# Patient Record
Sex: FEMALE | Race: WHITE | NOT HISPANIC OR LATINO | Employment: OTHER | URBAN - METROPOLITAN AREA
[De-identification: names, ages, dates, MRNs, and addresses within clinical notes are randomized per-mention and may not be internally consistent; named-entity substitution may affect disease eponyms.]

---

## 2017-01-05 ENCOUNTER — ALLSCRIPTS OFFICE VISIT (OUTPATIENT)
Dept: OTHER | Facility: OTHER | Age: 72
End: 2017-01-05

## 2017-01-07 ENCOUNTER — ANESTHESIA EVENT (OUTPATIENT)
Dept: PERIOP | Facility: AMBULARY SURGERY CENTER | Age: 72
End: 2017-01-07
Payer: MEDICARE

## 2017-01-09 ENCOUNTER — ANESTHESIA (OUTPATIENT)
Dept: PERIOP | Facility: AMBULARY SURGERY CENTER | Age: 72
End: 2017-01-09
Payer: MEDICARE

## 2017-01-09 ENCOUNTER — HOSPITAL ENCOUNTER (OUTPATIENT)
Facility: AMBULARY SURGERY CENTER | Age: 72
Setting detail: OUTPATIENT SURGERY
Discharge: HOME | End: 2017-01-09
Attending: OPHTHALMOLOGY | Admitting: OPHTHALMOLOGY
Payer: MEDICARE

## 2017-01-09 VITALS
HEART RATE: 65 BPM | TEMPERATURE: 97.7 F | BODY MASS INDEX: 28.19 KG/M2 | SYSTOLIC BLOOD PRESSURE: 199 MMHG | WEIGHT: 180 LBS | OXYGEN SATURATION: 96 % | DIASTOLIC BLOOD PRESSURE: 91 MMHG | RESPIRATION RATE: 20 BRPM

## 2017-01-09 PROCEDURE — V2632 POST CHMBR INTRAOCULAR LENS: HCPCS | Performed by: OPHTHALMOLOGY

## 2017-01-09 DEVICE — IOL SN60WF 13.0: Type: IMPLANTABLE DEVICE | Site: EYE | Status: FUNCTIONAL

## 2017-01-09 RX ORDER — GATIFLOXACIN 5 MG/ML
1 SOLUTION/ DROPS OPHTHALMIC 2 TIMES DAILY
Qty: 3 ML | Refills: 0
Start: 2017-01-09 | End: 2017-02-08

## 2017-01-09 RX ORDER — TETRACAINE HYDROCHLORIDE 5 MG/ML
SOLUTION OPHTHALMIC AS NEEDED
Status: DISCONTINUED | OUTPATIENT
Start: 2017-01-09 | End: 2017-01-09 | Stop reason: HOSPADM

## 2017-01-09 RX ORDER — LIDOCAINE HYDROCHLORIDE 20 MG/ML
1 JELLY TOPICAL
Status: COMPLETED | OUTPATIENT
Start: 2017-01-09 | End: 2017-01-09

## 2017-01-09 RX ORDER — BALANCED SALT SOLUTION 6.4; .75; .48; .3; 3.9; 1.7 MG/ML; MG/ML; MG/ML; MG/ML; MG/ML; MG/ML
SOLUTION OPHTHALMIC AS NEEDED
Status: DISCONTINUED | OUTPATIENT
Start: 2017-01-09 | End: 2017-01-09 | Stop reason: HOSPADM

## 2017-01-09 RX ORDER — TETRACAINE HYDROCHLORIDE 5 MG/ML
1 SOLUTION OPHTHALMIC ONCE
Status: COMPLETED | OUTPATIENT
Start: 2017-01-09 | End: 2017-01-09

## 2017-01-09 RX ORDER — GATIFLOXACIN 5 MG/ML
SOLUTION/ DROPS OPHTHALMIC AS NEEDED
Status: DISCONTINUED | OUTPATIENT
Start: 2017-01-09 | End: 2017-01-09 | Stop reason: HOSPADM

## 2017-01-09 RX ORDER — LIDOCAINE HYDROCHLORIDE 10 MG/ML
INJECTION, SOLUTION EPIDURAL; INFILTRATION; INTRACAUDAL; PERINEURAL AS NEEDED
Status: DISCONTINUED | OUTPATIENT
Start: 2017-01-09 | End: 2017-01-09 | Stop reason: HOSPADM

## 2017-01-09 RX ORDER — PHENYLEPHRINE HCL 2.5 %
1 DROPS OPHTHALMIC (EYE)
Status: COMPLETED | OUTPATIENT
Start: 2017-01-09 | End: 2017-01-09

## 2017-01-09 RX ORDER — KETOROLAC TROMETHAMINE 5 MG/ML
1 SOLUTION OPHTHALMIC 4 TIMES DAILY
Qty: 6 ML | Refills: 0
Start: 2017-01-09 | End: 2019-01-07 | Stop reason: ALTCHOICE

## 2017-01-09 RX ORDER — MIDAZOLAM HYDROCHLORIDE 1 MG/ML
INJECTION INTRAMUSCULAR; INTRAVENOUS AS NEEDED
Status: DISCONTINUED | OUTPATIENT
Start: 2017-01-09 | End: 2017-01-09 | Stop reason: SURG

## 2017-01-09 RX ORDER — SODIUM CHLORIDE, SODIUM LACTATE, POTASSIUM CHLORIDE, CALCIUM CHLORIDE 600; 310; 30; 20 MG/100ML; MG/100ML; MG/100ML; MG/100ML
125 INJECTION, SOLUTION INTRAVENOUS CONTINUOUS
Status: DISCONTINUED | OUTPATIENT
Start: 2017-01-09 | End: 2017-01-09 | Stop reason: HOSPADM

## 2017-01-09 RX ORDER — CYCLOPENTOLATE HYDROCHLORIDE 10 MG/ML
1 SOLUTION/ DROPS OPHTHALMIC
Status: COMPLETED | OUTPATIENT
Start: 2017-01-09 | End: 2017-01-09

## 2017-01-09 RX ADMIN — Medication 1 DROP: at 11:15

## 2017-01-09 RX ADMIN — Medication 1 DROP: at 10:30

## 2017-01-09 RX ADMIN — TETRACAINE HYDROCHLORIDE 1 DROP: 5 SOLUTION OPHTHALMIC at 10:30

## 2017-01-09 RX ADMIN — LIDOCAINE HYDROCHLORIDE 1 APPLICATION: 20 JELLY TOPICAL at 11:00

## 2017-01-09 RX ADMIN — CYCLOPENTOLATE HYDROCHLORIDE 1 DROP: 10 SOLUTION/ DROPS OPHTHALMIC at 11:15

## 2017-01-09 RX ADMIN — Medication 1 DROP: at 11:00

## 2017-01-09 RX ADMIN — LIDOCAINE HYDROCHLORIDE 1 APPLICATION: 20 JELLY TOPICAL at 10:30

## 2017-01-09 RX ADMIN — CYCLOPENTOLATE HYDROCHLORIDE 1 DROP: 10 SOLUTION/ DROPS OPHTHALMIC at 10:45

## 2017-01-09 RX ADMIN — CYCLOPENTOLATE HYDROCHLORIDE 1 DROP: 10 SOLUTION/ DROPS OPHTHALMIC at 10:30

## 2017-01-09 RX ADMIN — LIDOCAINE HYDROCHLORIDE 1 APPLICATION: 20 JELLY TOPICAL at 11:15

## 2017-01-09 RX ADMIN — CYCLOPENTOLATE HYDROCHLORIDE 1 DROP: 10 SOLUTION/ DROPS OPHTHALMIC at 11:00

## 2017-01-09 RX ADMIN — MIDAZOLAM HYDROCHLORIDE 2 MG: 1 INJECTION, SOLUTION INTRAMUSCULAR; INTRAVENOUS at 12:01

## 2017-01-09 RX ADMIN — Medication 1 DROP: at 10:45

## 2017-01-09 RX ADMIN — LIDOCAINE HYDROCHLORIDE 1 APPLICATION: 20 JELLY TOPICAL at 10:45

## 2017-01-12 ENCOUNTER — ALLSCRIPTS OFFICE VISIT (OUTPATIENT)
Dept: OTHER | Facility: OTHER | Age: 72
End: 2017-01-12

## 2017-01-16 ENCOUNTER — ALLSCRIPTS OFFICE VISIT (OUTPATIENT)
Dept: OTHER | Facility: OTHER | Age: 72
End: 2017-01-16

## 2017-02-06 ENCOUNTER — HOSPITAL ENCOUNTER (OUTPATIENT)
Facility: AMBULARY SURGERY CENTER | Age: 72
Setting detail: OUTPATIENT SURGERY
Discharge: HOME | End: 2017-02-06
Attending: OPHTHALMOLOGY | Admitting: OPHTHALMOLOGY
Payer: MEDICARE

## 2017-02-06 ENCOUNTER — ANESTHESIA EVENT (OUTPATIENT)
Dept: PERIOP | Facility: AMBULARY SURGERY CENTER | Age: 72
End: 2017-02-06
Payer: MEDICARE

## 2017-02-06 ENCOUNTER — ANESTHESIA (OUTPATIENT)
Dept: PERIOP | Facility: AMBULARY SURGERY CENTER | Age: 72
End: 2017-02-06
Payer: MEDICARE

## 2017-02-06 VITALS
RESPIRATION RATE: 20 BRPM | SYSTOLIC BLOOD PRESSURE: 169 MMHG | DIASTOLIC BLOOD PRESSURE: 79 MMHG | HEART RATE: 57 BPM | OXYGEN SATURATION: 95 % | TEMPERATURE: 97.5 F

## 2017-02-06 PROCEDURE — V2632 POST CHMBR INTRAOCULAR LENS: HCPCS | Performed by: OPHTHALMOLOGY

## 2017-02-06 DEVICE — IOL SN60WF 13.0: Type: IMPLANTABLE DEVICE | Site: EYE | Status: FUNCTIONAL

## 2017-02-06 RX ORDER — LIDOCAINE HYDROCHLORIDE 20 MG/ML
1 JELLY TOPICAL
Status: COMPLETED | OUTPATIENT
Start: 2017-02-06 | End: 2017-02-06

## 2017-02-06 RX ORDER — MIDAZOLAM HYDROCHLORIDE 1 MG/ML
INJECTION INTRAMUSCULAR; INTRAVENOUS AS NEEDED
Status: DISCONTINUED | OUTPATIENT
Start: 2017-02-06 | End: 2017-02-06 | Stop reason: SURG

## 2017-02-06 RX ORDER — CYCLOPENTOLATE HYDROCHLORIDE 10 MG/ML
1 SOLUTION/ DROPS OPHTHALMIC
Status: COMPLETED | OUTPATIENT
Start: 2017-02-06 | End: 2017-02-06

## 2017-02-06 RX ORDER — LIDOCAINE HYDROCHLORIDE 10 MG/ML
INJECTION, SOLUTION EPIDURAL; INFILTRATION; INTRACAUDAL; PERINEURAL AS NEEDED
Status: DISCONTINUED | OUTPATIENT
Start: 2017-02-06 | End: 2017-02-06 | Stop reason: HOSPADM

## 2017-02-06 RX ORDER — SODIUM CHLORIDE 9 MG/ML
INJECTION, SOLUTION INTRAVENOUS CONTINUOUS PRN
Status: DISCONTINUED | OUTPATIENT
Start: 2017-02-06 | End: 2017-02-06 | Stop reason: SURG

## 2017-02-06 RX ORDER — PANTOPRAZOLE SODIUM 40 MG/1
40 TABLET, DELAYED RELEASE ORAL
COMMUNITY
End: 2018-10-09 | Stop reason: SDUPTHER

## 2017-02-06 RX ORDER — GATIFLOXACIN 5 MG/ML
SOLUTION/ DROPS OPHTHALMIC AS NEEDED
Status: DISCONTINUED | OUTPATIENT
Start: 2017-02-06 | End: 2017-02-06 | Stop reason: HOSPADM

## 2017-02-06 RX ORDER — TETRACAINE HYDROCHLORIDE 5 MG/ML
SOLUTION OPHTHALMIC AS NEEDED
Status: DISCONTINUED | OUTPATIENT
Start: 2017-02-06 | End: 2017-02-06 | Stop reason: HOSPADM

## 2017-02-06 RX ORDER — TETRACAINE HYDROCHLORIDE 5 MG/ML
1 SOLUTION OPHTHALMIC ONCE
Status: COMPLETED | OUTPATIENT
Start: 2017-02-06 | End: 2017-02-06

## 2017-02-06 RX ORDER — PHENYLEPHRINE HCL 2.5 %
1 DROPS OPHTHALMIC (EYE)
Status: COMPLETED | OUTPATIENT
Start: 2017-02-06 | End: 2017-02-06

## 2017-02-06 RX ADMIN — PHENYLEPHRINE HYDROCHLORIDE 1 DROP: 25 SOLUTION/ DROPS OPHTHALMIC at 08:15

## 2017-02-06 RX ADMIN — CYCLOPENTOLATE HYDROCHLORIDE 1 DROP: 10 SOLUTION/ DROPS OPHTHALMIC at 07:59

## 2017-02-06 RX ADMIN — KETOROLAC TROMETHAMINE 1 DROP: 4.5 SOLUTION/ DROPS OPHTHALMIC at 08:15

## 2017-02-06 RX ADMIN — MIDAZOLAM HYDROCHLORIDE 1 MG: 1 INJECTION, SOLUTION INTRAMUSCULAR; INTRAVENOUS at 08:48

## 2017-02-06 RX ADMIN — CYCLOPENTOLATE HYDROCHLORIDE 1 DROP: 10 SOLUTION/ DROPS OPHTHALMIC at 08:42

## 2017-02-06 RX ADMIN — PHENYLEPHRINE HYDROCHLORIDE 1 DROP: 25 SOLUTION/ DROPS OPHTHALMIC at 08:03

## 2017-02-06 RX ADMIN — KETOROLAC TROMETHAMINE 1 DROP: 4.5 SOLUTION/ DROPS OPHTHALMIC at 08:42

## 2017-02-06 RX ADMIN — LIDOCAINE HYDROCHLORIDE 1 APPLICATION: 20 JELLY TOPICAL at 08:03

## 2017-02-06 RX ADMIN — KETOROLAC TROMETHAMINE 1 DROP: 4.5 SOLUTION/ DROPS OPHTHALMIC at 08:03

## 2017-02-06 RX ADMIN — KETOROLAC TROMETHAMINE 1 DROP: 4.5 SOLUTION/ DROPS OPHTHALMIC at 08:26

## 2017-02-06 RX ADMIN — TETRACAINE HYDROCHLORIDE 1 DROP: 5 SOLUTION OPHTHALMIC at 08:04

## 2017-02-06 RX ADMIN — LIDOCAINE HYDROCHLORIDE 1 APPLICATION: 20 JELLY TOPICAL at 08:42

## 2017-02-06 RX ADMIN — LIDOCAINE HYDROCHLORIDE 1 APPLICATION: 20 JELLY TOPICAL at 08:15

## 2017-02-06 RX ADMIN — CYCLOPENTOLATE HYDROCHLORIDE 1 DROP: 10 SOLUTION/ DROPS OPHTHALMIC at 08:26

## 2017-02-06 RX ADMIN — MIDAZOLAM HYDROCHLORIDE 1 MG: 1 INJECTION, SOLUTION INTRAMUSCULAR; INTRAVENOUS at 08:49

## 2017-02-06 RX ADMIN — PHENYLEPHRINE HYDROCHLORIDE 1 DROP: 25 SOLUTION/ DROPS OPHTHALMIC at 08:27

## 2017-02-06 RX ADMIN — SODIUM CHLORIDE: 0.9 INJECTION, SOLUTION INTRAVENOUS at 08:48

## 2017-02-06 RX ADMIN — LIDOCAINE HYDROCHLORIDE 1 APPLICATION: 20 JELLY TOPICAL at 08:27

## 2017-02-06 RX ADMIN — CYCLOPENTOLATE HYDROCHLORIDE 1 DROP: 10 SOLUTION/ DROPS OPHTHALMIC at 08:14

## 2017-02-06 RX ADMIN — PHENYLEPHRINE HYDROCHLORIDE 1 DROP: 25 SOLUTION/ DROPS OPHTHALMIC at 08:42

## 2017-03-16 ENCOUNTER — TRANSCRIBE ORDERS (OUTPATIENT)
Dept: ADMINISTRATIVE | Facility: HOSPITAL | Age: 72
End: 2017-03-16

## 2017-03-16 DIAGNOSIS — R13.10 DYSPHAGIA, UNSPECIFIED TYPE: Primary | ICD-10-CM

## 2017-03-24 ENCOUNTER — APPOINTMENT (OUTPATIENT)
Dept: RADIOLOGY | Facility: HOSPITAL | Age: 72
End: 2017-03-24
Attending: INTERNAL MEDICINE
Payer: MEDICARE

## 2017-03-24 ENCOUNTER — HOSPITAL ENCOUNTER (OUTPATIENT)
Dept: RADIOLOGY | Facility: HOSPITAL | Age: 72
Discharge: HOME | End: 2017-03-24
Attending: INTERNAL MEDICINE
Payer: MEDICARE

## 2017-03-24 DIAGNOSIS — K44.9 DIAPHRAGMATIC HERNIA WITHOUT OBSTRUCTION OR GANGRENE: ICD-10-CM

## 2017-03-24 PROCEDURE — 74246 X-RAY XM UPR GI TRC 2CNTRST: CPT

## 2017-03-28 ENCOUNTER — GENERIC CONVERSION - ENCOUNTER (OUTPATIENT)
Dept: OTHER | Facility: OTHER | Age: 72
End: 2017-03-28

## 2017-04-13 ENCOUNTER — TRANSCRIBE ORDERS (OUTPATIENT)
Dept: ADMINISTRATIVE | Facility: HOSPITAL | Age: 72
End: 2017-04-13

## 2017-04-13 ENCOUNTER — APPOINTMENT (OUTPATIENT)
Dept: LAB | Facility: HOSPITAL | Age: 72
End: 2017-04-13
Attending: INTERNAL MEDICINE
Payer: MEDICARE

## 2017-04-13 DIAGNOSIS — D64.9 ANEMIA: ICD-10-CM

## 2017-04-13 LAB
BASOPHILS # BLD AUTO: 0.1 THOUSANDS/ΜL (ref 0–0.1)
BASOPHILS NFR BLD AUTO: 1 % (ref 0–1)
EOSINOPHIL # BLD AUTO: 0.2 THOUSAND/ΜL (ref 0–0.61)
EOSINOPHIL NFR BLD AUTO: 3 % (ref 0–6)
ERYTHROCYTE [DISTWIDTH] IN BLOOD BY AUTOMATED COUNT: 14.9 % (ref 11.6–15.1)
FERRITIN SERPL-MCNC: 62 NG/ML (ref 8–388)
HCT VFR BLD AUTO: 47.4 % (ref 37–47)
HGB BLD-MCNC: 15.5 G/DL (ref 12–16)
IRON SATN MFR SERPL: 31 %
IRON SERPL-MCNC: 102 UG/DL (ref 50–170)
LYMPHOCYTES # BLD AUTO: 1.4 THOUSANDS/ΜL (ref 0.6–4.47)
LYMPHOCYTES NFR BLD AUTO: 18 % (ref 14–44)
MCH RBC QN AUTO: 29.4 PG (ref 27–31)
MCHC RBC AUTO-ENTMCNC: 32.7 G/DL (ref 31.4–37.4)
MCV RBC AUTO: 90 FL (ref 82–98)
MONOCYTES # BLD AUTO: 0.4 THOUSAND/ΜL (ref 0.17–1.22)
MONOCYTES NFR BLD AUTO: 5 % (ref 4–12)
NEUTROPHILS # BLD AUTO: 5.7 THOUSANDS/ΜL (ref 1.85–7.62)
NEUTS SEG NFR BLD AUTO: 73 % (ref 43–75)
NRBC BLD AUTO-RTO: 0 /100 WBCS
PLATELET # BLD AUTO: 339 THOUSANDS/UL (ref 130–400)
PMV BLD AUTO: 7.7 FL (ref 8.9–12.7)
RBC # BLD AUTO: 5.27 MILLION/UL (ref 4.2–5.4)
TIBC SERPL-MCNC: 324 UG/DL (ref 250–450)
WBC # BLD AUTO: 7.8 THOUSAND/UL (ref 4.8–10.8)

## 2017-04-13 PROCEDURE — 36415 COLL VENOUS BLD VENIPUNCTURE: CPT

## 2017-04-13 PROCEDURE — 83550 IRON BINDING TEST: CPT

## 2017-04-13 PROCEDURE — 83540 ASSAY OF IRON: CPT

## 2017-04-13 PROCEDURE — 82728 ASSAY OF FERRITIN: CPT

## 2017-04-13 PROCEDURE — 85025 COMPLETE CBC W/AUTO DIFF WBC: CPT

## 2017-04-16 DIAGNOSIS — D64.9 ANEMIA: ICD-10-CM

## 2017-04-18 ENCOUNTER — ALLSCRIPTS OFFICE VISIT (OUTPATIENT)
Dept: OTHER | Facility: OTHER | Age: 72
End: 2017-04-18

## 2017-05-11 ENCOUNTER — ALLSCRIPTS OFFICE VISIT (OUTPATIENT)
Dept: OTHER | Facility: OTHER | Age: 72
End: 2017-05-11

## 2017-06-02 ENCOUNTER — ANESTHESIA EVENT (OUTPATIENT)
Dept: GASTROENTEROLOGY | Facility: AMBULARY SURGERY CENTER | Age: 72
End: 2017-06-02
Payer: MEDICARE

## 2017-06-05 ENCOUNTER — HOSPITAL ENCOUNTER (OUTPATIENT)
Facility: AMBULARY SURGERY CENTER | Age: 72
Setting detail: OUTPATIENT SURGERY
Discharge: HOME | End: 2017-06-05
Attending: INTERNAL MEDICINE | Admitting: INTERNAL MEDICINE
Payer: MEDICARE

## 2017-06-05 ENCOUNTER — ANESTHESIA (OUTPATIENT)
Dept: GASTROENTEROLOGY | Facility: AMBULARY SURGERY CENTER | Age: 72
End: 2017-06-05
Payer: MEDICARE

## 2017-06-05 ENCOUNTER — GENERIC CONVERSION - ENCOUNTER (OUTPATIENT)
Dept: OTHER | Facility: OTHER | Age: 72
End: 2017-06-05

## 2017-06-05 ENCOUNTER — APPOINTMENT (OUTPATIENT)
Dept: LAB | Facility: HOSPITAL | Age: 72
End: 2017-06-05
Attending: INTERNAL MEDICINE
Payer: MEDICARE

## 2017-06-05 ENCOUNTER — TRANSCRIBE ORDERS (OUTPATIENT)
Dept: ADMINISTRATIVE | Facility: HOSPITAL | Age: 72
End: 2017-06-05

## 2017-06-05 VITALS
RESPIRATION RATE: 16 BRPM | HEART RATE: 65 BPM | OXYGEN SATURATION: 98 % | SYSTOLIC BLOOD PRESSURE: 139 MMHG | TEMPERATURE: 97.6 F | DIASTOLIC BLOOD PRESSURE: 74 MMHG

## 2017-06-05 DIAGNOSIS — R13.10 DYSPHAGIA: ICD-10-CM

## 2017-06-05 DIAGNOSIS — D64.9 ANEMIA, UNSPECIFIED: ICD-10-CM

## 2017-06-05 DIAGNOSIS — D64.9 ANEMIA, UNSPECIFIED: Primary | ICD-10-CM

## 2017-06-05 DIAGNOSIS — D64.9 ANEMIA: ICD-10-CM

## 2017-06-05 DIAGNOSIS — R13.10 PROBLEMS WITH SWALLOWING AND MASTICATION: ICD-10-CM

## 2017-06-05 LAB
ERYTHROCYTE [DISTWIDTH] IN BLOOD BY AUTOMATED COUNT: 14.3 % (ref 11.6–15.1)
HCT VFR BLD AUTO: 46.5 % (ref 37–47)
HGB BLD-MCNC: 15.4 G/DL (ref 12–16)
MCH RBC QN AUTO: 30.8 PG (ref 27–31)
MCHC RBC AUTO-ENTMCNC: 33.2 G/DL (ref 31.4–37.4)
MCV RBC AUTO: 93 FL (ref 82–98)
PLATELET # BLD AUTO: 332 THOUSANDS/UL (ref 130–400)
PMV BLD AUTO: 7.6 FL (ref 8.9–12.7)
RBC # BLD AUTO: 5.02 MILLION/UL (ref 4.2–5.4)
WBC # BLD AUTO: 6.2 THOUSAND/UL (ref 4.8–10.8)

## 2017-06-05 PROCEDURE — 88342 IMHCHEM/IMCYTCHM 1ST ANTB: CPT | Performed by: INTERNAL MEDICINE

## 2017-06-05 PROCEDURE — 88305 TISSUE EXAM BY PATHOLOGIST: CPT | Performed by: INTERNAL MEDICINE

## 2017-06-05 PROCEDURE — 85027 COMPLETE CBC AUTOMATED: CPT

## 2017-06-05 RX ORDER — SODIUM CHLORIDE, SODIUM LACTATE, POTASSIUM CHLORIDE, CALCIUM CHLORIDE 600; 310; 30; 20 MG/100ML; MG/100ML; MG/100ML; MG/100ML
100 INJECTION, SOLUTION INTRAVENOUS CONTINUOUS
Status: DISCONTINUED | OUTPATIENT
Start: 2017-06-05 | End: 2017-06-05 | Stop reason: HOSPADM

## 2017-06-05 RX ORDER — PROPOFOL 10 MG/ML
INJECTION, EMULSION INTRAVENOUS AS NEEDED
Status: DISCONTINUED | OUTPATIENT
Start: 2017-06-05 | End: 2017-06-05 | Stop reason: SURG

## 2017-06-05 RX ORDER — LIDOCAINE HYDROCHLORIDE 20 MG/ML
INJECTION, SOLUTION EPIDURAL; INFILTRATION; INTRACAUDAL; PERINEURAL AS NEEDED
Status: DISCONTINUED | OUTPATIENT
Start: 2017-06-05 | End: 2017-06-05 | Stop reason: SURG

## 2017-06-05 RX ORDER — SODIUM CHLORIDE, SODIUM LACTATE, POTASSIUM CHLORIDE, CALCIUM CHLORIDE 600; 310; 30; 20 MG/100ML; MG/100ML; MG/100ML; MG/100ML
125 INJECTION, SOLUTION INTRAVENOUS CONTINUOUS
Status: DISCONTINUED | OUTPATIENT
Start: 2017-06-05 | End: 2017-06-05 | Stop reason: SDUPTHER

## 2017-06-05 RX ADMIN — PROPOFOL 40 MG: 10 INJECTION, EMULSION INTRAVENOUS at 14:04

## 2017-06-05 RX ADMIN — SODIUM CHLORIDE, SODIUM LACTATE, POTASSIUM CHLORIDE, AND CALCIUM CHLORIDE 125 ML/HR: .6; .31; .03; .02 INJECTION, SOLUTION INTRAVENOUS at 12:45

## 2017-06-05 RX ADMIN — PROPOFOL 100 MG: 10 INJECTION, EMULSION INTRAVENOUS at 13:56

## 2017-06-05 RX ADMIN — PROPOFOL 30 MG: 10 INJECTION, EMULSION INTRAVENOUS at 13:58

## 2017-06-05 RX ADMIN — PROPOFOL 20 MG: 10 INJECTION, EMULSION INTRAVENOUS at 14:00

## 2017-06-05 RX ADMIN — PROPOFOL 20 MG: 10 INJECTION, EMULSION INTRAVENOUS at 14:03

## 2017-06-05 RX ADMIN — LIDOCAINE HYDROCHLORIDE 40 MG: 20 INJECTION, SOLUTION EPIDURAL; INFILTRATION; INTRACAUDAL; PERINEURAL at 13:56

## 2017-06-05 RX ADMIN — PROPOFOL 20 MG: 10 INJECTION, EMULSION INTRAVENOUS at 14:02

## 2017-06-05 RX ADMIN — PROPOFOL 30 MG: 10 INJECTION, EMULSION INTRAVENOUS at 13:57

## 2017-06-05 RX ADMIN — SODIUM CHLORIDE, SODIUM LACTATE, POTASSIUM CHLORIDE, AND CALCIUM CHLORIDE: .6; .31; .03; .02 INJECTION, SOLUTION INTRAVENOUS at 13:35

## 2017-06-07 ENCOUNTER — ALLSCRIPTS OFFICE VISIT (OUTPATIENT)
Dept: OTHER | Facility: OTHER | Age: 72
End: 2017-06-07

## 2017-06-07 DIAGNOSIS — E78.5 HYPERLIPIDEMIA: ICD-10-CM

## 2017-06-07 DIAGNOSIS — R73.9 HYPERGLYCEMIA: ICD-10-CM

## 2017-06-10 ENCOUNTER — GENERIC CONVERSION - ENCOUNTER (OUTPATIENT)
Dept: OTHER | Facility: OTHER | Age: 72
End: 2017-06-10

## 2017-07-11 DIAGNOSIS — R13.10 DYSPHAGIA: ICD-10-CM

## 2017-07-11 DIAGNOSIS — D64.9 ANEMIA: ICD-10-CM

## 2017-08-03 ENCOUNTER — ALLSCRIPTS OFFICE VISIT (OUTPATIENT)
Dept: OTHER | Facility: OTHER | Age: 72
End: 2017-08-03

## 2017-12-13 ENCOUNTER — GENERIC CONVERSION - ENCOUNTER (OUTPATIENT)
Dept: OTHER | Facility: OTHER | Age: 72
End: 2017-12-13

## 2017-12-13 ENCOUNTER — APPOINTMENT (OUTPATIENT)
Dept: LAB | Facility: HOSPITAL | Age: 72
End: 2017-12-13
Attending: INTERNAL MEDICINE
Payer: MEDICARE

## 2017-12-13 ENCOUNTER — TRANSCRIBE ORDERS (OUTPATIENT)
Dept: ADMINISTRATIVE | Facility: HOSPITAL | Age: 72
End: 2017-12-13

## 2017-12-13 DIAGNOSIS — R73.9 HYPERGLYCEMIA: ICD-10-CM

## 2017-12-13 DIAGNOSIS — E78.5 HYPERLIPIDEMIA: ICD-10-CM

## 2017-12-13 LAB
ALBUMIN SERPL BCP-MCNC: 3.4 G/DL (ref 3.5–5)
ALP SERPL-CCNC: 242 U/L (ref 46–116)
ALT SERPL W P-5'-P-CCNC: 23 U/L (ref 12–78)
ANION GAP SERPL CALCULATED.3IONS-SCNC: 7 MMOL/L (ref 4–13)
AST SERPL W P-5'-P-CCNC: 17 U/L (ref 5–45)
BILIRUB SERPL-MCNC: 0.8 MG/DL (ref 0.2–1)
BUN SERPL-MCNC: 14 MG/DL (ref 5–25)
CALCIUM SERPL-MCNC: 9.2 MG/DL (ref 8.3–10.1)
CHLORIDE SERPL-SCNC: 106 MMOL/L (ref 100–108)
CHOLEST SERPL-MCNC: 156 MG/DL (ref 50–200)
CO2 SERPL-SCNC: 31 MMOL/L (ref 21–32)
CREAT SERPL-MCNC: 0.64 MG/DL (ref 0.6–1.3)
EST. AVERAGE GLUCOSE BLD GHB EST-MCNC: 128 MG/DL
GFR SERPL CREATININE-BSD FRML MDRD: 91 ML/MIN/1.73SQ M
GLUCOSE P FAST SERPL-MCNC: 97 MG/DL (ref 65–99)
HBA1C MFR BLD: 6.1 % (ref 4.2–6.3)
HDLC SERPL-MCNC: 51 MG/DL (ref 40–60)
LDLC SERPL CALC-MCNC: 84 MG/DL (ref 0–100)
POTASSIUM SERPL-SCNC: 3.8 MMOL/L (ref 3.5–5.3)
PROT SERPL-MCNC: 6.9 G/DL (ref 6.4–8.2)
SODIUM SERPL-SCNC: 144 MMOL/L (ref 136–145)
TRIGL SERPL-MCNC: 107 MG/DL

## 2017-12-13 PROCEDURE — 36415 COLL VENOUS BLD VENIPUNCTURE: CPT

## 2017-12-13 PROCEDURE — 83036 HEMOGLOBIN GLYCOSYLATED A1C: CPT

## 2017-12-13 PROCEDURE — 80061 LIPID PANEL: CPT

## 2017-12-13 PROCEDURE — 80053 COMPREHEN METABOLIC PANEL: CPT

## 2017-12-18 ENCOUNTER — GENERIC CONVERSION - ENCOUNTER (OUTPATIENT)
Dept: OTHER | Facility: OTHER | Age: 72
End: 2017-12-18

## 2017-12-18 DIAGNOSIS — Z12.31 ENCOUNTER FOR SCREENING MAMMOGRAM FOR MALIGNANT NEOPLASM OF BREAST: ICD-10-CM

## 2018-01-10 NOTE — RESULT NOTES
Discussion/Summary   please inform pt that cbc is normal  she is here for EGD today and I will inform her  Verified Results  (1) CBC/ PLT (NO DIFF) [id number] 08:14AM [facility] Order Number: [id number]     Test Name Result Flag Reference   HEMATOCRIT 46 5 %  37 0-47 0   HEMOGLOBIN 15 4 g/dL  12 0-16 0   MCHC 33 2 g/dL  31 4-37 4   MCH 30 8 pg  27 0-31 0   MCV 93 fL  82-98   PLATELET COUNT 542 Thousands/uL  130-400   RBC COUNT 5 02 Million/uL  4 20-5  40   RDW 14 3 %  11 6-15 1   WBC COUNT 6 20 Thousand/uL  4 80-10 80   MPV 7 6 fL L 8 9-12 7

## 2018-01-11 NOTE — RESULT NOTES
Discussion/Summary   Please inform the patient that biopsies were negative for H  pylori from her stomach  Please make sure that she continues to take pantoprazole  Please find out if she has improvement in her swallowing ability  Please have her schedule office follow-up in 6 months or as needed  Please have the patient call with any questions or concerns  Verified Results  (1) TISSUE EXAM [id number] 02:00PM Almita Campos     Test Name Result Flag Reference   LAB AP CASE REPORT (Report)     Surgical Pathology Report             Case: [id number]                   Authorizing Provider: Joyce Lugo MD      Collected:      06/05/2017 1400        Ordering Location:   [facility]  Received:      06/06/2017 [address] [facility]                                     Pathologist:      Tiffani Sheikh MD                              Specimen:  Stomach, gastric body bx/ro hpylori   LAB AP FINAL DIAGNOSIS (Report)     A  Stomach, body, biopsy:    - Oxyntic mucosa with mild chronic inactive gastritis  - Immunostain for H  pylori (with appropriate positive control) is   negative  - No intestinal metaplasia, dysplasia or neoplasia identified  Electronically signed by Tiffani Sheikh MD on 6/8/2017 at 10:21 AM   LAB AP SURGICAL ADDITIONAL INFORMATION (Report)     These tests were developed and their performance characteristics   determined by [facility] or [employer]  They may not be cleared or approved by the [place]  Food and   Drug Administration  The FDA has determined that such clearance or   approval is not necessary  These tests are used for clinical purposes  They should not be regarded as investigational or for research  This   laboratory has been approved by CLIA 88, designated as a high-complexity   laboratory and is qualified to perform these tests      - Interpretation performed at [address] [address]   LAB AP GROSS DESCRIPTION (Report)     A  The specimen is received in formalin, labeled with the patient's name   and hospital number, and is designated gastric body biopsy rule out H    pylori  The specimen consists of several, rubbery, tan-brown tissue   fragments measuring 0 7 x 0 7 x 0 2 cm in aggregate dimension  Entirely   submitted  One cassette  Note: The estimated total formalin fixation time based upon information   provided by the submitting clinician and the standard processing schedule   is 30 5 hours      Pavel Tracy

## 2018-01-12 VITALS
HEART RATE: 59 BPM | WEIGHT: 174.25 LBS | SYSTOLIC BLOOD PRESSURE: 124 MMHG | OXYGEN SATURATION: 97 % | HEIGHT: 66 IN | BODY MASS INDEX: 28 KG/M2 | DIASTOLIC BLOOD PRESSURE: 70 MMHG

## 2018-01-12 VITALS
DIASTOLIC BLOOD PRESSURE: 98 MMHG | BODY MASS INDEX: 28.19 KG/M2 | OXYGEN SATURATION: 96 % | HEART RATE: 88 BPM | TEMPERATURE: 98.5 F | RESPIRATION RATE: 16 BRPM | HEIGHT: 66 IN | SYSTOLIC BLOOD PRESSURE: 140 MMHG | WEIGHT: 175.38 LBS

## 2018-01-13 VITALS
HEIGHT: 66 IN | TEMPERATURE: 98.3 F | BODY MASS INDEX: 27.84 KG/M2 | RESPIRATION RATE: 16 BRPM | OXYGEN SATURATION: 97 % | SYSTOLIC BLOOD PRESSURE: 138 MMHG | WEIGHT: 173.25 LBS | HEART RATE: 84 BPM | DIASTOLIC BLOOD PRESSURE: 88 MMHG

## 2018-01-14 VITALS
WEIGHT: 184.5 LBS | SYSTOLIC BLOOD PRESSURE: 140 MMHG | OXYGEN SATURATION: 96 % | HEIGHT: 66 IN | DIASTOLIC BLOOD PRESSURE: 80 MMHG | RESPIRATION RATE: 16 BRPM | HEART RATE: 69 BPM | BODY MASS INDEX: 29.65 KG/M2 | TEMPERATURE: 98.6 F

## 2018-01-14 VITALS
WEIGHT: 184.13 LBS | TEMPERATURE: 98.3 F | RESPIRATION RATE: 16 BRPM | HEART RATE: 71 BPM | HEIGHT: 66 IN | SYSTOLIC BLOOD PRESSURE: 120 MMHG | BODY MASS INDEX: 29.59 KG/M2 | DIASTOLIC BLOOD PRESSURE: 80 MMHG | OXYGEN SATURATION: 94 %

## 2018-01-14 VITALS
HEIGHT: 66 IN | HEART RATE: 68 BPM | SYSTOLIC BLOOD PRESSURE: 126 MMHG | OXYGEN SATURATION: 97 % | DIASTOLIC BLOOD PRESSURE: 70 MMHG | BODY MASS INDEX: 29.73 KG/M2 | WEIGHT: 185 LBS

## 2018-01-15 VITALS
DIASTOLIC BLOOD PRESSURE: 82 MMHG | SYSTOLIC BLOOD PRESSURE: 134 MMHG | TEMPERATURE: 97.9 F | HEART RATE: 88 BPM | BODY MASS INDEX: 30.05 KG/M2 | HEIGHT: 66 IN | RESPIRATION RATE: 20 BRPM | WEIGHT: 187 LBS

## 2018-01-15 NOTE — RESULT NOTES
Message   Dr Marlo Galvan patient     Verified Results  (1) TISSUE EXAM [id number] 08:19AM Anai Door     Test Name Result Flag Reference   LAB AP CASE REPORT (Report)     Surgical Pathology Report             Case: [id number]                   Authorizing Provider: Gris Adrian MD     Collected:      12/03/2016 7363        Ordering Location:   [address] [place] [address] Received:      12/05/2016 1703                    [address]                                    Pathologist:      Osmin Solomon MD                             Specimen:  Colon, Bx descending colon/ulceration ischemic colitis   LAB AP FINAL DIAGNOSIS (Report)     A  Descending colon, biopsy:    - Ulceration, acute inflammation with fibrinous neutrophilic exudate,   glandular dropout, few fibrin thrombi and      reactive epithelial changes (see note)  - Negative for granulomas, dysplasia and malignancy  Electronically signed by Osmin Solomon MD on 12/6/2016 at 12:05 PM   LAB AP NOTE      With the appropriate clinical and endoscopic findings, the features would   be consistent with ischemic colitis  Interpretation performed at [facility], [address] [place] [address]   LAB AP SURGICAL ADDITIONAL INFORMATION (Report)     These tests were developed and their performance characteristics   determined by [facility] or [facility]  They may not be cleared or approved by the [place]  Food and   Drug Administration  The FDA has determined that such clearance or   approval is not necessary  These tests are used for clinical purposes  They should not be regarded as investigational or for research  This   laboratory has been approved by CLIA 88, designated as a high-complexity   laboratory and is qualified to perform these tests  LAB AP GROSS DESCRIPTION (Report)     A   The specimen is received in formalin, labeled with the patient's name   and hospital number, and is designated descending colon/ulceration   biopsy  The specimen consists of 4 tan-pink soft tissue fragments   measuring 0 4 cm, 0 4 cm, 0 4 cm, and 1 0 cm in greatest dimension  Entirely submitted  One cassette  Note: The estimated total formalin fixation time based upon information   provided by the submitting clinician and the standard processing schedule   is 68 25 hours    MAS

## 2018-01-16 NOTE — RESULT NOTES
Message   Please inform patient that her upper GI series confirms a very large hiatal hernia  I like to see her back in the office in the next 4-6 weeks to discuss  Please make sure that she is taking PPI therapy twice daily  Please have her call with any questions or concerns  Verified Results  * FL UGI W/ AIR ROUTINE [id number] 08:55AM Rubi Adkins Order Number: [id number]    - Patient Instructions: To schedule this appointment, please contact Central Scheduling at [phone]  Test Name Result Flag Reference   FL UGI W/ AIR WO  (Report)     UPPER GI SERIES DOUBLE CONTRAST     INDICATION: Difficulty swallowing  Known hiatal hernia  Intermittent lower chest pain when eating and drinking  Symptoms have been present intermittently for years  COMPARISON: No prior fluoroscopic studies available  CT of the chest from July 25, 2012 was reviewed  IMAGES: 241     FLUOROSCOPY TIME:  1 9 min     TECHNIQUE: The patient was given effervescent granules and barium by mouth and images of the esophagus, stomach, and small bowel were obtained  FINDINGS:     Overall esophageal caliber appears within normal limits  Normal extrinsic mass effect from aortic arch is noted  There is a small Schatzki's ring  Emptying of contrast from the esophagus to the stomach is prompt  There was only mild esophageal    dysmotility suggested  Along the lower portion of the esophagus there is some extrinsic mass effect secondary to a large hiatal hernia which contains about the upper half of the stomach adjacent to the displaced gastroesophageal junction (type III  hiatal hernia)  There is no esophageal mucosal mass, ulceration or fold thickening identified  The stomach is normal in size  The gastric mucosa is normal  No penetrating ulcers or masses  Again, approximately the upper half or one third of the stomach is contained in the hernia sac   There is moderate amount of narrowing of the gastric lumen as it passes through the hiatus, but there is no evidence of obstruction  Contrast empties promptly into the duodenum  The duodenum is normal in caliber  The ligament of Treitz/duodenojejunal junction lies in a normal position  Incidental finding of diverticula of the descending duodenum  Gastroesophageal reflux was noted  At the end of the examination, the patient was given a barium pill to swallow  The pill passed promptly through the esophagus and into the stomach without any delay  Patient experienced no pain  Symptoms during the exam                IMPRESSION:     Relatively large type III hiatal hernia containing about the upper 3rd to 1/2 of the stomach which produces mild extrinsic mass effect on the lower esophagus  Schatzki's ring noted incidentally  Gastroesophageal reflux was visible during the exam      No suspicious masses or ulceration identified         Workstation performed: [id number]     Signed by:   Rhiannon Almaraz MD   3/24/17

## 2018-01-23 NOTE — RESULT NOTES
Verified Results  (1) COMPREHENSIVE METABOLIC PANEL [id number] 31:33CW Shea Alamo Order Number: [id number]     Test Name Result Flag Reference   SODIUM 144 mmol/L  136-145   POTASSIUM 3 8 mmol/L  3 5-5 3   CHLORIDE 106 mmol/L  100-108   CARBON DIOXIDE 31 mmol/L  21-32   ANION GAP (CALC) 7 mmol/L  4-13   BLOOD UREA NITROGEN 14 mg/dL  5-25   CREATININE 0 64 mg/dL  0 60-1 30   Standardized to IDMS reference method   CALCIUM 9 2 mg/dL  8 3-10 1   BILI, TOTAL 0 80 mg/dL  0 20-1 00   ALK PHOSPHATAS 242 U/L H    ALT (SGPT) 23 U/L  12-78   Specimen collection should occur prior to Sulfasalazine administration due to the potential for falsely depressed results  AST(SGOT) 17 U/L  5-45   Specimen collection should occur prior to Sulfasalazine administration due to the potential for falsely depressed results  ALBUMIN 3 4 g/dL L 3 5-5 0   TOTAL PROTEIN 6 9 g/dL  6 4-8 2   eGFR 91 ml/min/1 73sq m     National Kidney Disease Education Program recommendations are as follows:  GFR calculation is accurate only with a steady state creatinine  Chronic Kidney disease less than 60 ml/min/1 73 sq  meters  Kidney failure less than 15 ml/min/1 73 sq  meters  GLUCOSE FASTING 97 mg/dL  65-99   Specimen collection should occur prior to Sulfasalazine administration due to the potential for falsely depressed results  Specimen collection should occur prior to Sulfapyridine administration due to the potential for falsely elevated results  (1) LIPID PANEL, FASTING [id number] 08:09AM Shea Alamo Order Number: [id number]     Test Name Result Flag Reference   CHOLESTEROL 156 mg/dL     HDL,DIRECT 51 mg/dL  40-60   Specimen collection should occur prior to Metamizole administration due to the potential for falsley depressed results     LDL CHOLESTEROL CALCULATED 84 mg/dL  0-100   Triglyceride:        Normal <150 mg/dl   Borderline High 150-199 mg/dl   High 200-499 mg/dl   Very High >499 mg/dl      Cholesterol:       Desirable <200 mg/dl    Borderline High 200-239 mg/dl    High >239 mg/dl      HDL Cholesterol:       High>59 mg/dL    Low <41 mg/dL      This screening LDL is a calculated result  It does not have the accuracy of the Direct Measured LDL in the monitoring of patients with hyperlipidemia and/or statin therapy  Direct Measure LDL (GMQ085) must be ordered separately in these patients  TRIGLYCERIDES 107 mg/dL  <=150   Specimen collection should occur prior to N-Acetylcysteine or Metamizole administration due to the potential for falsely depressed results  (1) HEMOGLOBIN A1C [id number] 08:09AM Joymellisa Montemayor Order Number: [id number]     Test Name Result Flag Reference   HEMOGLOBIN A1C 6 1 %  4 2-6 3   EST  AVG   GLUCOSE 128 mg/dl

## 2018-01-24 VITALS
DIASTOLIC BLOOD PRESSURE: 72 MMHG | HEART RATE: 76 BPM | TEMPERATURE: 98.1 F | WEIGHT: 189 LBS | SYSTOLIC BLOOD PRESSURE: 120 MMHG | RESPIRATION RATE: 16 BRPM | BODY MASS INDEX: 30.51 KG/M2

## 2018-02-11 PROBLEM — I65.22 ASYMPTOMATIC STENOSIS OF LEFT CAROTID ARTERY: Status: ACTIVE | Noted: 2017-08-03

## 2018-02-11 PROBLEM — I27.20 MILD PULMONARY HYPERTENSION (HCC): Status: ACTIVE | Noted: 2017-08-03

## 2018-02-12 ENCOUNTER — OFFICE VISIT (OUTPATIENT)
Dept: CARDIOLOGY CLINIC | Facility: CLINIC | Age: 73
End: 2018-02-12
Payer: MEDICARE

## 2018-02-12 VITALS
HEART RATE: 72 BPM | WEIGHT: 185.6 LBS | SYSTOLIC BLOOD PRESSURE: 126 MMHG | BODY MASS INDEX: 29.13 KG/M2 | DIASTOLIC BLOOD PRESSURE: 72 MMHG | OXYGEN SATURATION: 96 % | HEIGHT: 67 IN

## 2018-02-12 DIAGNOSIS — I10 BENIGN ESSENTIAL HYPERTENSION: Primary | ICD-10-CM

## 2018-02-12 DIAGNOSIS — I65.22 ASYMPTOMATIC STENOSIS OF LEFT CAROTID ARTERY: ICD-10-CM

## 2018-02-12 DIAGNOSIS — E78.5 HYPERLIPIDEMIA, UNSPECIFIED HYPERLIPIDEMIA TYPE: ICD-10-CM

## 2018-02-12 DIAGNOSIS — I27.20 MILD PULMONARY HYPERTENSION (HCC): ICD-10-CM

## 2018-02-12 PROCEDURE — 99214 OFFICE O/P EST MOD 30 MIN: CPT | Performed by: INTERNAL MEDICINE

## 2018-02-12 PROCEDURE — 93000 ELECTROCARDIOGRAM COMPLETE: CPT | Performed by: INTERNAL MEDICINE

## 2018-02-12 NOTE — PROGRESS NOTES
Progress Note - Cardiology Office  Derrek Knight 81 y o  female MRN: [id number]   Encounter: [id number]    Assessment/Plan   1  Hypertension  Patient blood pressure was pretty well controlled with the current medical therapy  She is back on her usual medications  2  Palpitation  Not much of a problem since she is on atenolol  3  Asymptomatic left carotid stenosis  She has a 20-39 percent stenosis  Continue aspirin and statins  She is tolerating it very well  4  Ischemic colitis  Patient status post EGD and colonoscopy  Hemoglobin has improved  5  Dyslipidemia  Patient is on Lipitor  Tolerating well continue current  Follow-up in 6 months    Counseling :  Patient's ability to self care: Yes  Medication side effect reviewed with patient in detail and all their questions answered to their satisfaction  HPI :     Derrek Knight is a 81y o  year old female who came for follow up  Patient has past medical history significant for hypertension, hyperlipidemia, palpitations, who was just recently admitted to [facility] with severe anemia and was thought to have ischemic colitis of splenic flexure secondary to her severe anemia  She was given iron and her hemoglobin has now improved to 12  She denies any chest pain or shortness of breath  She had heart shoulder surgery done on the right shoulder without any problems  There is no PND, no orthopnea, no other significant nosgwyuch79/03/2017  Patient clinically doing better  She came for follow-up of hypertension hyperlipidemia and palpitation  Her anemia has improved  She could not by atenolol as it is the backstop  Complaining about some leg edema since she is on amlodipine for blood pressure  She is diagnosed to have mild pulmonary hypertension  No fever no chills no PND no orthopnea  Still have some exertional shortness of breath which is not changed  No other significant new complaint  02/11/2018  Above reviewed in detail  Patient came for follow-up    She has been doing much better  Her hemoglobin has improved  Blood pressure is much better  Denies any chest pain or any shortness of breath  No leg edema  No nausea no vomiting no fever no chills no syncope no dizziness no other significant cardiovascular complaint  Above reviewed blood pressure at home has been acceptable  She was briefly started on Bystolic as she could not get atenolol  She prefers to go back on atenolol and has been tolerating it very well  Review of Systems   Constitutional: Negative for activity change, chills, diaphoresis, fever and unexpected weight change  HENT: Negative for congestion  Eyes: Negative for discharge and redness  Respiratory: Negative for cough, chest tightness, shortness of breath and wheezing  Cardiovascular: Negative  Negative for chest pain, palpitations and leg swelling  Gastrointestinal: Negative for abdominal pain, diarrhea and nausea  Endocrine: Negative  Genitourinary: Negative for decreased urine volume and urgency  Musculoskeletal: Negative  Negative for arthralgias, back pain and gait problem  Skin: Negative for rash and wound  Allergic/Immunologic: Negative  Neurological: Negative for dizziness, seizures, syncope, weakness, light-headedness and headaches  Hematological: Negative  Psychiatric/Behavioral: Negative for agitation and confusion  The patient is not nervous/anxious  Historical Information   Past Medical History:   Diagnosis Date    Arthritis     Coronary artery disease     "blockages" sees Dr Cedric Bernard Nehemias Depression     with insomnia    Diverticulosis     occ diverticulitis    Hiatal hernia     History of transfusion     had 2 units-11/16    Hyperlipidemia     Hypertension     Iron deficiency anemia     chronic-receives iron infusion usually every 6 months     Past Surgical History:   Procedure Laterality Date    APPENDECTOMY      BREAST SURGERY Bilateral     exc  lashawn masses-benign    CATARACT EXTRACTION W/ INTRAOCULAR LENS IMPLANT Right 2/6/2017    Procedure: EXTRACTION EXTRACAPSULAR CATARACT PHACO INTRAOCULAR LENS (IOL); Surgeon: Sanaz Alamo MD;  Location: [facility] MAIN OR;  Service:     CATARACT EXTRACTION W/ INTRAOCULAR LENS IMPLANT Left 1/9/2017    Procedure: EXTRACTION EXTRACAPSULAR CATARACT PHACO INTRAOCULAR LENS (IOL); Surgeon: Sanaz Alamo MD;  Location: [facility] MAIN OR;  Service:     CHOLECYSTECTOMY      lap    COLONOSCOPY N/A 12/3/2016    Procedure: COLONOSCOPY;  Surgeon: Sudha Torres MD;  Location: [place] [address] GI LAB; Service:     CYSTOSCOPY  06/2011    bladder biopsy, lashawn  retrogrades    DILATION AND CURETTAGE OF UTERUS      x2    ESOPHAGOGASTRODUODENOSCOPY N/A 12/2/2016    Procedure: ESOPHAGOGASTRODUODENOSCOPY (EGD); Surgeon: Carol Kraus MD;  Location: [facility] GI LAB; Service:     ESOPHAGOGASTRODUODENOSCOPY N/A 6/5/2017    Procedure: ESOPHAGOGASTRODUODENOSCOPY (EGD); Surgeon: Sudha Torres MD;  Location: [facility] GI LAB;   Service:     FRACTURE SURGERY Right     distal radius repair w/hardware    HAND TENDON SURGERY Right     laceration repair    HERNIA REPAIR      umbilical    KNEE SURGERY Right 07/03/2014    mass removed    TOTAL SHOULDER ARTHROPLASTY Right 7/14/2016    Procedure: ARTHROPLASTY SHOULDER with subacromial decompression, distal clavicle excision and possible rotator cuff repair,limited debridement of laboral tear;  Surgeon: Rosemary Felipe MD;  Location: [facility] MAIN OR;  Service:     WRIST SURGERY Right 12/2013    repair fracture with hardware     History   Alcohol Use No     History   Drug Use No     History   Smoking Status    Never Smoker   Smokeless Tobacco    Never Used     Family History:   Family History   Problem Relation Age of Onset    Cancer Father [de-identified]     colon     Hypertension Father     Cancer Paternal Grandmother      breast    Stroke Mother      TIA       Meds/Allergies     Allergies   Allergen Reactions    Shellfish-Derived Products Anaphylaxis     seafood    Sulfa Antibiotics Anaphylaxis       Current Outpatient Prescriptions:     amLODIPine (NORVASC) 5 mg tablet, Take 5 mg by mouth daily at bedtime  , Disp: , Rfl:     aspirin 81 MG tablet, Take 81 mg by mouth daily at bedtime  , Disp: , Rfl:     atenolol (TENORMIN) 25 mg tablet, Take 25 mg by mouth daily at bedtime  , Disp: , Rfl:     atorvastatin (LIPITOR) 10 mg tablet, Take 10 mg by mouth daily at bedtime  , Disp: , Rfl:     Calcium-Vitamin D (CALTRATE 600 PLUS-VIT D PO), Take by mouth daily at bedtime  , Disp: , Rfl:     Fexofenadine HCl (ALLEGRA PO), Take 10 mg by mouth as needed  , Disp: , Rfl:     pantoprazole (PROTONIX) 40 mg tablet, Take 40 mg by mouth 2 (two) times a day, Disp: , Rfl:     zolpidem (AMBIEN) 5 mg tablet, Take 5 mg by mouth daily at bedtime as needed for sleep , Disp: , Rfl:     ferrous sulfate 325 (65 Fe) mg tablet, Take 1 tablet by mouth daily with breakfast for 30 days, Disp: 30 tablet, Rfl: 0    ketorolac (ACULAR) 0 5 % ophthalmic solution, Administer 1 drop into the left eye 4 (four) times a day for 30 days, Disp: 6 mL, Rfl: 0    Vitals: Blood pressure 126/72, pulse 72, height 5' 7" (1 702 m), weight 84 2 kg (185 lb 9 6 oz), SpO2 96 %  Body mass index is 29 07 kg/m²      BP Readings from Last 3 Encounters:   02/12/18 126/72   12/18/17 120/72   08/03/17 126/70       Physical Exam:  Physical Exam    Neurologic:  Alert & oriented x 3, no new focal deficits, Not in any acute distress,  Constitutional:  Well developed, well nourished, non-toxic appearance   Eyes:  Pupil equal and reacting to light, conjunctiva normal, No JVP, No LNP   HENT:  Atraumatic, oropharynx moist, Neck- normal range of motion, no tenderness,  Neck supple   Respiratory:  Bilateral air entry, mostly clear to auscultation  Cardiovascular: S1-S2 regular with a 2/6 ejection systolic murmur and S4 is present  GI:  Soft, nondistended, normal bowel sounds, nontender, no hepatosplenomegaly appreciated  Musculoskeletal:  No edema, no tenderness, no deformities  Skin:  Well hydrated, no rash   Lymphatic:  No lymphadenopathy noted   Extremities:  No edema and distal pulses are present      Patient Active Problem List    Diagnosis Date Noted    Asymptomatic stenosis of left carotid artery 2017    Mild pulmonary hypertension 2017    Ischemic colitis ([address] ) 2016    Fever 2016    Anemia 2016    Dizziness 2016    Benign essential hypertension 2013    Hyperlipidemia 2013     Diagnostic Studies Review Cardio:  Stress Test: Nuclear stress test done in 2015 shows focal basal septal myocardial ischemia most likely artifactual due to location  EF was 65%  Echocardiogram/WILFRID: Echo done 2015 shows EF 70%, mild aortic valve sclerosis without stenosis, mild MR, mild pulmonary hypertension with PA pressure 35-40 mmHg  Vascular imagin-39% stenosis of right front quarter artery  1-19% stenosis on left internal carotid artery in study in   Holter/Event Recorder: In  patient's Holter monitor shows average heart rate 81  Few PACs were noted  ECG Report: Normal sinus rhythm heart rate 61 bpm  No significant changes  EKG done about 6 months back  Twelve lead EKG 2018 shows normal sinus rhythm  Rare PVCs heart rate 65 beats per minute  No significant ST changes      Lab Review   Lab Results   Component Value Date    WBC 6 20 2017    HGB 15 4 2017    HCT 46 5 2017    MCV 93 2017     2017     Lab Results   Component Value Date     2017    K 3 8 2017     2017    CO2 31 2017    ANIONGAP 7 2017    BUN 14 2017    CREATININE 0 64 2017    GLUCOSE 106 2016    GLUF 97 2017    CALCIUM 9 2 2017    AST 17 2017    ALT 23 2017    ALKPHOS 242 (H) 2017    PROT 6 9 2017    BILITOT 0 80 2017    EGFR 91 2017 Lab Results   Component Value Date    GLUCOSE 106 12/05/2016    CALCIUM 9 2 12/13/2017     12/13/2017    K 3 8 12/13/2017    CO2 31 12/13/2017     12/13/2017    BUN 14 12/13/2017    CREATININE 0 64 12/13/2017     No results found for: BNP   No results found for: TSH  Lab Results   Component Value Date    HGBA1C 6 1 12/13/2017     Lipid Profile:   Lab Results   Component Value Date    CHOL 156 12/13/2017    HDL 51 12/13/2017    LDLCALC 84 12/13/2017    TRIG 107 12/13/2017     Lab Results   Component Value Date    CHOL 156 12/13/2017    CHOL 215 (H) 10/18/2016         Dr Elijah Larios MD [facility]      "This note has been constructed using a voice recognition system  Therefore there may be syntax, spelling, and/or grammatical errors   Please call if you have any questions  "

## 2018-02-14 DIAGNOSIS — I10 BENIGN ESSENTIAL HYPERTENSION: Primary | ICD-10-CM

## 2018-02-14 RX ORDER — AMLODIPINE BESYLATE 2.5 MG/1
2.5 TABLET ORAL DAILY
Qty: 30 TABLET | Refills: 3 | Status: SHIPPED | OUTPATIENT
Start: 2018-02-14 | End: 2018-07-11 | Stop reason: SDUPTHER

## 2018-02-14 NOTE — TELEPHONE ENCOUNTER
Dr Suni Novoa    Can you please confirm her directions before you authorize her refill? She said she takes 1/2 tablet daily

## 2018-02-15 DIAGNOSIS — G47.00 INSOMNIA, UNSPECIFIED TYPE: Primary | ICD-10-CM

## 2018-02-15 RX ORDER — ZOLPIDEM TARTRATE 5 MG/1
5 TABLET ORAL
Qty: 30 TABLET | Refills: 0 | Status: SHIPPED | OUTPATIENT
Start: 2018-02-15 | End: 2018-03-19 | Stop reason: SDUPTHER

## 2018-03-19 DIAGNOSIS — G47.00 INSOMNIA, UNSPECIFIED TYPE: ICD-10-CM

## 2018-03-19 PROBLEM — K44.9 HIATAL HERNIA: Status: ACTIVE | Noted: 2017-01-05

## 2018-03-19 RX ORDER — ZOLPIDEM TARTRATE 5 MG/1
5 TABLET ORAL
Qty: 30 TABLET | Refills: 0 | OUTPATIENT
Start: 2018-03-19 | End: 2018-04-16 | Stop reason: SDUPTHER

## 2018-04-16 DIAGNOSIS — G47.00 INSOMNIA, UNSPECIFIED TYPE: ICD-10-CM

## 2018-04-16 RX ORDER — ZOLPIDEM TARTRATE 5 MG/1
5 TABLET ORAL
Qty: 30 TABLET | Refills: 0 | Status: SHIPPED | OUTPATIENT
Start: 2018-04-16 | End: 2018-05-18 | Stop reason: SDUPTHER

## 2018-05-16 DIAGNOSIS — I10 BENIGN ESSENTIAL HYPERTENSION: Primary | ICD-10-CM

## 2018-05-16 RX ORDER — ATENOLOL 25 MG/1
25 TABLET ORAL
Qty: 90 TABLET | Refills: 0 | Status: SHIPPED | OUTPATIENT
Start: 2018-05-16 | End: 2018-06-05 | Stop reason: SDUPTHER

## 2018-05-16 NOTE — TELEPHONE ENCOUNTER
Pt left a message on refill hotline for a refill on her atenolol 25 mg 1 daily sent to [facility] pharmacy  Can you please refill for pt  Dr Venkata Sarkar not in office until Monday  Thank you   Octavia Liu

## 2018-05-17 ENCOUNTER — TELEPHONE (OUTPATIENT)
Dept: FAMILY MEDICINE CLINIC | Facility: CLINIC | Age: 73
End: 2018-05-17

## 2018-05-17 DIAGNOSIS — G47.00 INSOMNIA, UNSPECIFIED TYPE: ICD-10-CM

## 2018-05-17 DIAGNOSIS — F51.01 PRIMARY INSOMNIA: Primary | ICD-10-CM

## 2018-05-17 NOTE — TELEPHONE ENCOUNTER
Pt left a odessa on refill hotline for a refill on her zolpidem 5 mg one at bedtime sent to [facility] pharmacy  Will you refill for pt Dr Jose C Chawla not in office until Monday   Octavia Dickens

## 2018-05-18 RX ORDER — ZOLPIDEM TARTRATE 5 MG/1
5 TABLET ORAL
Qty: 30 TABLET | Refills: 3 | Status: SHIPPED | OUTPATIENT
Start: 2018-05-18 | End: 2018-09-12 | Stop reason: SDUPTHER

## 2018-06-05 DIAGNOSIS — I10 BENIGN ESSENTIAL HYPERTENSION: ICD-10-CM

## 2018-06-05 RX ORDER — ATENOLOL 25 MG/1
25 TABLET ORAL
Qty: 90 TABLET | Refills: 0 | Status: SHIPPED | OUTPATIENT
Start: 2018-06-05 | End: 2018-09-04 | Stop reason: SDUPTHER

## 2018-06-05 NOTE — TELEPHONE ENCOUNTER
Usually sees Dr Arlette Cruz but out of office until tomorrow, please review medication request, jazmine

## 2018-06-11 ENCOUNTER — TRANSCRIBE ORDERS (OUTPATIENT)
Dept: ADMINISTRATIVE | Facility: HOSPITAL | Age: 73
End: 2018-06-11

## 2018-06-11 ENCOUNTER — APPOINTMENT (OUTPATIENT)
Dept: LAB | Facility: HOSPITAL | Age: 73
End: 2018-06-11
Attending: INTERNAL MEDICINE
Payer: MEDICARE

## 2018-06-11 DIAGNOSIS — E78.5 HYPERLIPIDEMIA: ICD-10-CM

## 2018-06-11 LAB
ALBUMIN SERPL BCP-MCNC: 3.3 G/DL (ref 3.5–5)
ALP SERPL-CCNC: 230 U/L (ref 46–116)
ALT SERPL W P-5'-P-CCNC: 28 U/L (ref 12–78)
ANION GAP SERPL CALCULATED.3IONS-SCNC: 7 MMOL/L (ref 4–13)
AST SERPL W P-5'-P-CCNC: 21 U/L (ref 5–45)
BASOPHILS # BLD AUTO: 0.06 THOUSANDS/ΜL (ref 0–0.1)
BASOPHILS NFR BLD AUTO: 1 % (ref 0–1)
BILIRUB SERPL-MCNC: 0.7 MG/DL (ref 0.2–1)
BUN SERPL-MCNC: 13 MG/DL (ref 5–25)
CALCIUM SERPL-MCNC: 8.7 MG/DL (ref 8.3–10.1)
CHLORIDE SERPL-SCNC: 106 MMOL/L (ref 100–108)
CHOLEST SERPL-MCNC: 153 MG/DL (ref 50–200)
CO2 SERPL-SCNC: 31 MMOL/L (ref 21–32)
CREAT SERPL-MCNC: 0.65 MG/DL (ref 0.6–1.3)
EOSINOPHIL # BLD AUTO: 0.28 THOUSAND/ΜL (ref 0–0.61)
EOSINOPHIL NFR BLD AUTO: 4 % (ref 0–6)
ERYTHROCYTE [DISTWIDTH] IN BLOOD BY AUTOMATED COUNT: 14.6 % (ref 11.6–15.1)
GFR SERPL CREATININE-BSD FRML MDRD: 90 ML/MIN/1.73SQ M
GLUCOSE P FAST SERPL-MCNC: 97 MG/DL (ref 65–99)
HCT VFR BLD AUTO: 42.8 % (ref 34.8–46.1)
HDLC SERPL-MCNC: 51 MG/DL (ref 40–60)
HGB BLD-MCNC: 13.6 G/DL (ref 11.5–15.4)
IMM GRANULOCYTES # BLD AUTO: 0.02 THOUSAND/UL (ref 0–0.2)
IMM GRANULOCYTES NFR BLD AUTO: 0 % (ref 0–2)
LDLC SERPL CALC-MCNC: 83 MG/DL (ref 0–100)
LYMPHOCYTES # BLD AUTO: 1.31 THOUSANDS/ΜL (ref 0.6–4.47)
LYMPHOCYTES NFR BLD AUTO: 21 % (ref 14–44)
MCH RBC QN AUTO: 28.3 PG (ref 26.8–34.3)
MCHC RBC AUTO-ENTMCNC: 31.8 G/DL (ref 31.4–37.4)
MCV RBC AUTO: 89 FL (ref 82–98)
MONOCYTES # BLD AUTO: 0.46 THOUSAND/ΜL (ref 0.17–1.22)
MONOCYTES NFR BLD AUTO: 7 % (ref 4–12)
NEUTROPHILS # BLD AUTO: 4.17 THOUSANDS/ΜL (ref 1.85–7.62)
NEUTS SEG NFR BLD AUTO: 67 % (ref 43–75)
NONHDLC SERPL-MCNC: 102 MG/DL
NRBC BLD AUTO-RTO: 0 /100 WBCS
PLATELET # BLD AUTO: 329 THOUSANDS/UL (ref 149–390)
PMV BLD AUTO: 9.8 FL (ref 8.9–12.7)
POTASSIUM SERPL-SCNC: 3.7 MMOL/L (ref 3.5–5.3)
PROT SERPL-MCNC: 7 G/DL (ref 6.4–8.2)
RBC # BLD AUTO: 4.8 MILLION/UL (ref 3.81–5.12)
SODIUM SERPL-SCNC: 144 MMOL/L (ref 136–145)
TRIGL SERPL-MCNC: 93 MG/DL
WBC # BLD AUTO: 6.3 THOUSAND/UL (ref 4.31–10.16)

## 2018-06-11 PROCEDURE — 85025 COMPLETE CBC W/AUTO DIFF WBC: CPT

## 2018-06-11 PROCEDURE — 80053 COMPREHEN METABOLIC PANEL: CPT

## 2018-06-11 PROCEDURE — 80061 LIPID PANEL: CPT

## 2018-06-11 PROCEDURE — 36415 COLL VENOUS BLD VENIPUNCTURE: CPT

## 2018-06-12 ENCOUNTER — HOSPITAL ENCOUNTER (OUTPATIENT)
Dept: RADIOLOGY | Facility: HOSPITAL | Age: 73
Discharge: HOME | End: 2018-06-12
Attending: INTERNAL MEDICINE
Payer: MEDICARE

## 2018-06-12 DIAGNOSIS — Z12.31 ENCOUNTER FOR SCREENING MAMMOGRAM FOR MALIGNANT NEOPLASM OF BREAST: ICD-10-CM

## 2018-06-12 PROCEDURE — 77067 SCR MAMMO BI INCL CAD: CPT

## 2018-06-13 DIAGNOSIS — E78.5 HYPERLIPIDEMIA, UNSPECIFIED HYPERLIPIDEMIA TYPE: Primary | ICD-10-CM

## 2018-06-13 NOTE — TELEPHONE ENCOUNTER
Pt left message on refill hotline for a refill on atorvastatin 10 mg 1 daily sent to [facility] pharmacy   [place], [place]

## 2018-06-14 RX ORDER — PAROXETINE HYDROCHLORIDE 20 MG/1
TABLET, FILM COATED ORAL
COMMUNITY
Start: 2018-05-03 | End: 2018-08-06 | Stop reason: SDUPTHER

## 2018-06-14 RX ORDER — ATORVASTATIN CALCIUM 10 MG/1
10 TABLET, FILM COATED ORAL
Qty: 90 TABLET | Refills: 0 | Status: SHIPPED | OUTPATIENT
Start: 2018-06-14 | End: 2018-09-12 | Stop reason: SDUPTHER

## 2018-06-18 ENCOUNTER — OFFICE VISIT (OUTPATIENT)
Dept: FAMILY MEDICINE CLINIC | Facility: CLINIC | Age: 73
End: 2018-06-18
Payer: MEDICARE

## 2018-06-18 VITALS
TEMPERATURE: 98.3 F | HEIGHT: 67 IN | SYSTOLIC BLOOD PRESSURE: 130 MMHG | DIASTOLIC BLOOD PRESSURE: 80 MMHG | HEART RATE: 64 BPM | RESPIRATION RATE: 16 BRPM | BODY MASS INDEX: 29.35 KG/M2 | WEIGHT: 187 LBS

## 2018-06-18 DIAGNOSIS — J45.909 UNCOMPLICATED ASTHMA, UNSPECIFIED ASTHMA SEVERITY, UNSPECIFIED WHETHER PERSISTENT: ICD-10-CM

## 2018-06-18 DIAGNOSIS — E78.5 HYPERLIPIDEMIA, UNSPECIFIED HYPERLIPIDEMIA TYPE: ICD-10-CM

## 2018-06-18 DIAGNOSIS — F32.A DEPRESSION, UNSPECIFIED DEPRESSION TYPE: ICD-10-CM

## 2018-06-18 DIAGNOSIS — E78.5 HYPERLIPIDEMIA: ICD-10-CM

## 2018-06-18 DIAGNOSIS — F51.01 PRIMARY INSOMNIA: ICD-10-CM

## 2018-06-18 DIAGNOSIS — I10 BENIGN ESSENTIAL HYPERTENSION: Primary | ICD-10-CM

## 2018-06-18 PROCEDURE — 99214 OFFICE O/P EST MOD 30 MIN: CPT | Performed by: INTERNAL MEDICINE

## 2018-06-18 RX ORDER — RANITIDINE 150 MG/1
150 TABLET ORAL AS NEEDED
COMMUNITY
End: 2019-06-26

## 2018-06-18 NOTE — PROGRESS NOTES
Subjective:      Patient ID: Jaqueline Mckeon is a 72 y o  female  Chief Complaint   Patient presents with    Follow-up     6 month f/u  prcma    Hypertension       She is here for follow up  [facility] [place] and needed her inhaler but has not needed otherwise this summer  Denies chest pain or dyspnea  Feels well  No side effects with her medications  The following portions of the patient's history were reviewed and updated as appropriate: allergies, current medications, past family history, past medical history, past social history, past surgical history and problem list     Review of Systems   Constitutional: Negative  Respiratory: Negative  Cardiovascular: Negative  Current Outpatient Prescriptions   Medication Sig Dispense Refill    amLODIPine (NORVASC) 2 5 mg tablet Take 1 tablet (2 5 mg total) by mouth daily 1/2 tablet daily (Patient taking differently: Take 5 mg by mouth daily  ) 30 tablet 3    aspirin 81 MG tablet Take 81 mg by mouth daily at bedtime   atenolol (TENORMIN) 25 mg tablet Take 1 tablet (25 mg total) by mouth daily at bedtime 90 tablet 0    atorvastatin (LIPITOR) 10 mg tablet Take 1 tablet (10 mg total) by mouth daily at bedtime 90 tablet 0    Calcium-Vitamin D (CALTRATE 600 PLUS-VIT D PO) Take by mouth daily at bedtime   Fexofenadine HCl (ALLEGRA PO) Take 10 mg by mouth as needed        pantoprazole (PROTONIX) 40 mg tablet Take 40 mg by mouth 2 (two) times a day      PARoxetine (PAXIL) 20 mg tablet       ranitidine (ZANTAC) 150 mg tablet Take 150 mg by mouth as needed for heartburn      VENTOLIN  (90 Base) MCG/ACT inhaler Inhale 2 puffs every 6 (six) hours as needed for wheezing 3 Inhaler 3    zolpidem (AMBIEN) 5 mg tablet Take 1 tablet (5 mg total) by mouth daily at bedtime as needed for sleep 30 tablet 3    ferrous sulfate 325 (65 Fe) mg tablet Take 1 tablet by mouth daily with breakfast for 30 days 30 tablet 0    ketorolac (ACULAR) 0 5 % ophthalmic solution Administer 1 drop into the left eye 4 (four) times a day for 30 days 6 mL 0     No current facility-administered medications for this visit  Objective:    /80   Pulse 64   Temp 98 3 °F (36 8 °C)   Resp 16   Ht 5' 7" (1 702 m)   Wt 84 8 kg (187 lb)   BMI 29 29 kg/m²        Physical Exam   Constitutional: She appears well-developed and well-nourished  Eyes: Conjunctivae are normal    Neck: Neck supple  No JVD present  No thyromegaly present  Cardiovascular: Normal rate, regular rhythm, normal heart sounds and intact distal pulses  Exam reveals no gallop and no friction rub  No murmur heard  Pulmonary/Chest: Effort normal and breath sounds normal  She has no wheezes  She has no rales  Abdominal: Soft  Bowel sounds are normal  She exhibits no distension  There is no tenderness  Musculoskeletal: She exhibits no edema  Assessment/Plan:    Benign essential hypertension  Stable on current regimen, will continue  Depression  Stable on paroxetine  Hyperlipidemia  Reviewed labs with patient  Lipids favorable on statin  Will continue and follow in 6 months  Asthma  Stable, continue PRN inhaler  Diagnoses and all orders for this visit:    Benign essential hypertension    Depression, unspecified depression type    Hyperlipidemia, unspecified hyperlipidemia type  -     Comprehensive metabolic panel; Future  -     Lipid panel; Future    Primary insomnia    Uncomplicated asthma, unspecified asthma severity, unspecified whether persistent  -     VENTOLIN  (90 Base) MCG/ACT inhaler; Inhale 2 puffs every 6 (six) hours as needed for wheezing    Other orders  -     Discontinue: VENTOLIN  (90 Base) MCG/ACT inhaler;   -     PARoxetine (PAXIL) 20 mg tablet;   -     ranitidine (ZANTAC) 150 mg tablet;  Take 150 mg by mouth as needed for heartburn          Return in about 6 months (around 12/18/2018) for 1/2 hour AWV and follow up  Annie Mack MD

## 2018-07-11 DIAGNOSIS — I10 BENIGN ESSENTIAL HYPERTENSION: ICD-10-CM

## 2018-07-11 NOTE — TELEPHONE ENCOUNTER
Pt left a message on refill hotline for a refill on her amlodipine 2 5 mg 1 daily sent to [facility] pharmacy   Frandy Mock, [address]

## 2018-07-12 RX ORDER — AMLODIPINE BESYLATE 2.5 MG/1
2.5 TABLET ORAL DAILY
Qty: 90 TABLET | Refills: 3 | Status: SHIPPED | OUTPATIENT
Start: 2018-07-12 | End: 2019-07-01 | Stop reason: SDUPTHER

## 2018-08-06 DIAGNOSIS — F32.A DEPRESSION, UNSPECIFIED DEPRESSION TYPE: Primary | ICD-10-CM

## 2018-08-07 RX ORDER — PAROXETINE HYDROCHLORIDE 20 MG/1
20 TABLET, FILM COATED ORAL DAILY
Qty: 90 TABLET | Refills: 1 | Status: SHIPPED | OUTPATIENT
Start: 2018-08-07 | End: 2019-01-07 | Stop reason: SDUPTHER

## 2018-08-07 NOTE — TELEPHONE ENCOUNTER
Pharmacy received, no further action needed   Vahid Pack, [address] [facility] [place]

## 2018-09-04 DIAGNOSIS — I10 BENIGN ESSENTIAL HYPERTENSION: ICD-10-CM

## 2018-09-04 RX ORDER — ATENOLOL 25 MG/1
25 TABLET ORAL
Qty: 90 TABLET | Refills: 3 | Status: SHIPPED | OUTPATIENT
Start: 2018-09-04 | End: 2019-09-12 | Stop reason: SDUPTHER

## 2018-09-12 DIAGNOSIS — G47.00 INSOMNIA, UNSPECIFIED TYPE: ICD-10-CM

## 2018-09-12 DIAGNOSIS — E78.5 HYPERLIPIDEMIA, UNSPECIFIED HYPERLIPIDEMIA TYPE: ICD-10-CM

## 2018-09-12 NOTE — TELEPHONE ENCOUNTER
Pt left a message on refill hotline for a refill on her atorvastatin 10 mg and zolpidem 5 mg sent to [facility]   Makayla Zaragoza, [address] [place]

## 2018-09-13 RX ORDER — ZOLPIDEM TARTRATE 5 MG/1
5 TABLET ORAL
Qty: 30 TABLET | Refills: 0 | Status: SHIPPED | OUTPATIENT
Start: 2018-09-13 | End: 2018-10-18 | Stop reason: SDUPTHER

## 2018-09-13 RX ORDER — ATORVASTATIN CALCIUM 10 MG/1
10 TABLET, FILM COATED ORAL
Qty: 90 TABLET | Refills: 1 | Status: SHIPPED | OUTPATIENT
Start: 2018-09-13 | End: 2019-01-07 | Stop reason: SDUPTHER

## 2018-10-09 ENCOUNTER — TELEPHONE (OUTPATIENT)
Dept: GASTROENTEROLOGY | Facility: CLINIC | Age: 73
End: 2018-10-09

## 2018-10-09 DIAGNOSIS — K21.9 GASTROESOPHAGEAL REFLUX DISEASE, ESOPHAGITIS PRESENCE NOT SPECIFIED: Primary | ICD-10-CM

## 2018-10-09 RX ORDER — PANTOPRAZOLE SODIUM 40 MG/1
40 TABLET, DELAYED RELEASE ORAL 2 TIMES DAILY
Qty: 60 TABLET | Refills: 2 | Status: SHIPPED | OUTPATIENT
Start: 2018-10-09 | End: 2019-04-15 | Stop reason: SDUPTHER

## 2018-10-09 NOTE — TELEPHONE ENCOUNTER
Sent prescription for 2 months however she needs follow-up prior to any additional refills as she hasnt been seen in over a year   Please schedule follow up with PA

## 2018-10-18 DIAGNOSIS — G47.00 INSOMNIA, UNSPECIFIED TYPE: ICD-10-CM

## 2018-10-18 DIAGNOSIS — F51.01 PRIMARY INSOMNIA: Primary | ICD-10-CM

## 2018-10-18 RX ORDER — ZOLPIDEM TARTRATE 5 MG/1
5 TABLET ORAL
Qty: 30 TABLET | Refills: 0 | Status: SHIPPED | OUTPATIENT
Start: 2018-10-18 | End: 2018-11-19 | Stop reason: SDUPTHER

## 2018-10-31 ENCOUNTER — OFFICE VISIT (OUTPATIENT)
Dept: GASTROENTEROLOGY | Facility: CLINIC | Age: 73
End: 2018-10-31
Payer: MEDICARE

## 2018-10-31 VITALS
WEIGHT: 188.4 LBS | HEART RATE: 72 BPM | DIASTOLIC BLOOD PRESSURE: 88 MMHG | BODY MASS INDEX: 29.57 KG/M2 | HEIGHT: 67 IN | TEMPERATURE: 98.3 F | SYSTOLIC BLOOD PRESSURE: 142 MMHG

## 2018-10-31 DIAGNOSIS — R74.8 ELEVATED ALKALINE PHOSPHATASE LEVEL: Primary | ICD-10-CM

## 2018-10-31 DIAGNOSIS — R74.8 ELEVATED ALKALINE PHOSPHATASE LEVEL: ICD-10-CM

## 2018-10-31 DIAGNOSIS — K25.7: ICD-10-CM

## 2018-10-31 DIAGNOSIS — R13.19 ESOPHAGEAL DYSPHAGIA: ICD-10-CM

## 2018-10-31 DIAGNOSIS — Z87.11 HISTORY OF GASTRIC ULCER: ICD-10-CM

## 2018-10-31 DIAGNOSIS — K21.9 GASTROESOPHAGEAL REFLUX DISEASE WITHOUT ESOPHAGITIS: Primary | ICD-10-CM

## 2018-10-31 PROCEDURE — 99214 OFFICE O/P EST MOD 30 MIN: CPT | Performed by: PHYSICIAN ASSISTANT

## 2018-10-31 NOTE — LETTER
October 31, 2018     Jazmine Garcia MD  [address] [facility] [place] [address]    Patient: Elinor Guthrie   YOB: 1945   Date of Visit: 10/31/2018       Dear Dr Mariusz Burt:    Thank you for referring Elinor Guthrie to me for evaluation  Below are my notes for this consultation  If you have questions, please do not hesitate to call me  I look forward to following your patient along with you  Sincerely,        Armida Chong PA-C        CC: No Recipients  Armida Chong PA-C  10/31/2018  1:31 PM  Sign at close encounter  Assessment and Plan    #1  GERD with intermittent esophageal dysphagia: hx of Schatzki's ring with dilation last year, also had large hiatal hernia and donn's ulcers  Dysphagia symptoms are very very rare but still happen occasionally   -Continue PPI daily  -If patient starts to notice more frequent issues with feeling like food is getting stuck, she was informed to call us and we will schedule repeat EGD to assess for need for dilation  -Anti-reflux measures and diet    #2  History of stomach ulcers and Donn's ulcers: no signs of melena or bleeding  -Continue PPI daily  -Call with any melena or blood in stool  -If patient has any complications in regards to hiatal hernia, we could refer her to thoracic surgery for repair    #3  Isolated elevated alkaline phosphatase: unclear source  Has been elevated since 2016 but worsened this year  No family or personal hx of liver, bone, intestinal, or kidney disease  -Repeat a hepatic function panel (last done in June 2018)  -Check alk phos isoenzymes  -Consider liver imaging with US pending lab results  --------------------------------------------------------------------------------------------------------------------    Chief Complaint: f/u GERD    HPI: Elinor Guthrie is a 72 y o  female who presents today for follow up for , Schatzki's ring, dysphagia, on stomach ulcers    Patient was last seen secondary to epigastric pain and dysphagia in June of 2017 at which time she had an upper endoscopy performed  This showed a mild nonobstructing Schatzki's ring in the distal esophagus as well as some tortuosity in presbyesophagus  Patient had dilation with improvement of symptoms  Patient also had multiple ulcers along the hiatal opening consistent with Donn's ulcers as well as some ulcers in the stomach  She did have a large hiatal hernia  She reports that with the pantoprazole, her symptoms have been greatly improved  She does report that she will still very rarely have an episode where she feels as food gets stuck  She reports the last happened about a week and half ago but prior to that she can even remember the last time that it has happened  She reports that there is no increase in the frequency of the symptoms as of this time  She reports that her acid reflux is well controlled on the medication once daily before bedtime  She denies any nausea or vomiting  She denies any abdominal pain  She denies any changes to her bowel movements  She denies any diarrhea, constipation, melena, or blood in the stool  She has had an elevated alkaline phosphatase dating back to 2016  She denies ever having workup for this in the past   She denies any personal or family history of liver disease  She denies any personal history of kidney, intestinal, or bone disease  He denies any alcohol use  She denies any herbal supplements or nutritional supplements  Patient's last colonoscopy was in 2016 Patient's last endoscopy was 2017       Review of Systems:   General: negative for fatigue, fever, night sweats or unexpected weight loss  Psychological: negative for anxiety or depression  Ophthalmic: negative for blurry vision or scleral icterus  ENT: negative for headaches, oral lesions, sore throat, vocal changes, +dysphagia  Hematological and Lymphatic: negative for pallor or swollen lymph nodes  Respiratory: negative for cough, shortness of breath or wheezing  Cardiovascular: negative for chest pain, edema or murmur  Gastrointestinal: as mentioned in HPI  Genito-Urinary: negative for dysuria or incontinence  Musculoskeletal: negative for joint pain, joint stiffness or joint swelling  Dermatological: negative for pruritus, rash, or jaundice    Current Medications  Current Outpatient Prescriptions   Medication Sig Dispense Refill    amLODIPine (NORVASC) 2 5 mg tablet Take 1 tablet (2 5 mg total) by mouth daily 1/2 tablet daily 90 tablet 3    aspirin 81 MG tablet Take 81 mg by mouth daily at bedtime   atenolol (TENORMIN) 25 mg tablet Take 1 tablet (25 mg total) by mouth daily at bedtime 90 tablet 3    atorvastatin (LIPITOR) 10 mg tablet Take 1 tablet (10 mg total) by mouth daily at bedtime 90 tablet 1    Calcium-Vitamin D (CALTRATE 600 PLUS-VIT D PO) Take by mouth daily at bedtime   Fexofenadine HCl (ALLEGRA PO) Take 10 mg by mouth as needed   pantoprazole (PROTONIX) 40 mg tablet Take 1 tablet (40 mg total) by mouth 2 (two) times a day 60 tablet 2    PARoxetine (PAXIL) 20 mg tablet Take 1 tablet (20 mg total) by mouth daily 90 tablet 1    ranitidine (ZANTAC) 150 mg tablet Take 150 mg by mouth as needed for heartburn      VENTOLIN  (90 Base) MCG/ACT inhaler Inhale 2 puffs every 6 (six) hours as needed for wheezing 3 Inhaler 3    zolpidem (AMBIEN) 5 mg tablet Take 1 tablet (5 mg total) by mouth daily at bedtime as needed for sleep 30 tablet 0    ferrous sulfate 325 (65 Fe) mg tablet Take 1 tablet by mouth daily with breakfast for 30 days 30 tablet 0    ketorolac (ACULAR) 0 5 % ophthalmic solution Administer 1 drop into the left eye 4 (four) times a day for 30 days 6 mL 0     No current facility-administered medications for this visit          Past Medical History  Past Medical History:   Diagnosis Date    Abnormal blood chemistry     abnormal biochemistry findings    Abnormal findings on diagnostic imaging of urinary organs     resolved 07/01/2016    Abnormal glucose     resolved 07/01/2016    Abnormal thyroid exam     resolved 07/01/2016    Abnormal thyroid screen (blood)     resolved 07/01/2016    Abnormal TSH     last assessed 03/07/2016    Allergic rhinitis     last assessed 06/25/2015    Arthritis     Closed Colles' fracture     right wrist, last assessed 01/17/2014    Coronary artery disease     "blockages" sees Dr Lenora Madden Depression     with insomnia    Diverticulosis     occ diverticulitis    Edema     last assessed 03/11/2015    Hematuria     last assessed 11/07/2013    Hiatal hernia     History of transfusion     had 2 units-11/16    Hyperlipidemia     Hypertension     Iron deficiency anemia     chronic-receives iron infusion usually every 6 months    Ischemic colitis ([facility] [address] )     last assessed 12/07/2016    Knee mass     last assessed 06/11/2014    Orthostatic hypotension     last assessed 03/26/2014    Osteoarthrosis of knee     last assessed 11/12/2014    Palpitations     PMB (postmenopausal bleeding)     last assessed 11/07/2013    Polyarthralgia     last assessed 06/09/2014    Posthemorrhagic anemia     last assessed 11/07/2013       Past Surgical History  Past Surgical History:   Procedure Laterality Date    APPENDECTOMY      BREAST SURGERY Bilateral     exc  lashawn masses-benign    CATARACT EXTRACTION W/ INTRAOCULAR LENS IMPLANT Right 2/6/2017    Procedure: EXTRACTION EXTRACAPSULAR CATARACT PHACO INTRAOCULAR LENS (IOL); Surgeon: Bhumi Shanks MD;  Location: [facility] OR;  Service:     CATARACT EXTRACTION W/ INTRAOCULAR LENS IMPLANT Left 1/9/2017    Procedure: EXTRACTION EXTRACAPSULAR CATARACT PHACO INTRAOCULAR LENS (IOL); Surgeon: Bhumi Shanks MD;  Location: [facility] OR;  Service:     CHOLECYSTECTOMY      lap    COLONOSCOPY N/A 12/3/2016    Procedure: COLONOSCOPY;  Surgeon: Bipin Matias MD;  [facility];   Service:     CYSTOSCOPY  06/2011    bladder biopsy, lashawn  retrogrades    DILATION AND CURETTAGE OF UTERUS      x2    ESOPHAGOGASTRODUODENOSCOPY N/A 12/2/2016    Procedure: ESOPHAGOGASTRODUODENOSCOPY (EGD); Surgeon: Shaka Kessler MD;  Location: [facility] GI LAB; Service:     ESOPHAGOGASTRODUODENOSCOPY N/A 6/5/2017    Procedure: ESOPHAGOGASTRODUODENOSCOPY (EGD); Surgeon: Heri Sandoval MD;  Location: [facility] GI LAB; Service:     FRACTURE SURGERY Right     distal radius repair w/hardware    HAND TENDON SURGERY Right     laceration repair    HERNIA REPAIR      umbilical    KNEE SURGERY Right 07/03/2014    mass removed    TOTAL SHOULDER ARTHROPLASTY Right 7/14/2016    Procedure: ARTHROPLASTY SHOULDER with subacromial decompression, distal clavicle excision and possible rotator cuff repair,limited debridement of laboral tear;  Surgeon: Delmi Garrett MD;  Location: [facility] MAIN OR;  Service:     WRIST SURGERY Right 12/2013    repair fracture with hardware       Past Social History   Social History     Social History    Marital status:       Spouse name: N/A    Number of children: N/A    Years of education: N/A     Social History Main Topics    Smoking status: Never Smoker    Smokeless tobacco: Never Used    Alcohol use No    Drug use: No    Sexual activity: Not Asked     Other Topics Concern    None     Social History Narrative    Daily coffee consumption       The following portions of the patient's history were reviewed and updated as appropriate: allergies, current medications, past family history, past medical history, past social history, past surgical history and problem list     Vital Signs  Vitals:    10/31/18 1303   BP: 142/88   BP Location: Right arm   Patient Position: Sitting   Cuff Size: Standard   Pulse: 72   Temp: 98 3 °F (36 8 °C)   TempSrc: Tympanic   Weight: 85 5 kg (188 lb 6 4 oz)   Height: 5' 7" (1 702 m)       Physical Exam:  General appearance: alert, cooperative, no distress  HEENT: normocephalic, anicteric, no eye erythema or discharge, no oropharyngeal thrush  Neck: supple, trachea midline, no adenopathy  Lungs: CTA b/l, no rales, rhonchi, or wheezing, unlabored respirations  Heart: RRR, no murmur, rubs, or gallops  Abdomen: soft, non-tender, non-distended, normal bowel sounds, no masses or organomegaly  Rectal: deferred  Extremities: no cyanosis, clubbing, or edema  Musculoskeletal: normal gait  Skin: color and texture normal, no jaundice, no rashes or lesions  Psychiatric: alert and oriented, normal affect and behavior

## 2018-10-31 NOTE — PROGRESS NOTES
Assessment and Plan    #1  GERD with intermittent esophageal dysphagia: hx of Schatzki's ring with dilation last year, also had large hiatal hernia and donn's ulcers  Dysphagia symptoms are very very rare but still happen occasionally   -Continue PPI daily  -If patient starts to notice more frequent issues with feeling like food is getting stuck, she was informed to call us and we will schedule repeat EGD to assess for need for dilation  -Anti-reflux measures and diet    #2  History of stomach ulcers and Donn's ulcers: no signs of melena or bleeding  -Continue PPI daily  -Call with any melena or blood in stool  -If patient has any complications in regards to hiatal hernia, we could refer her to thoracic surgery for repair    #3  Isolated elevated alkaline phosphatase: unclear source  Has been elevated since 2016 but worsened this year  No family or personal hx of liver, bone, intestinal, or kidney disease  -Repeat a hepatic function panel (last done in June 2018)  -Check alk phos isoenzymes  -Consider liver imaging with US pending lab results  --------------------------------------------------------------------------------------------------------------------    Chief Complaint: f/u GERD    HPI: Keshia Chavez is a 72 y o  female who presents today for follow up for , Schatzki's ring, dysphagia, on stomach ulcers  Patient was last seen secondary to epigastric pain and dysphagia in June of 2017 at which time she had an upper endoscopy performed  This showed a mild nonobstructing Schatzki's ring in the distal esophagus as well as some tortuosity in presbyesophagus  Patient had dilation with improvement of symptoms  Patient also had multiple ulcers along the hiatal opening consistent with Donn's ulcers as well as some ulcers in the stomach  She did have a large hiatal hernia  She reports that with the pantoprazole, her symptoms have been greatly improved    She does report that she will still very rarely have an episode where she feels as food gets stuck  She reports the last happened about a week and half ago but prior to that she can even remember the last time that it has happened  She reports that there is no increase in the frequency of the symptoms as of this time  She reports that her acid reflux is well controlled on the medication once daily before bedtime  She denies any nausea or vomiting  She denies any abdominal pain  She denies any changes to her bowel movements  She denies any diarrhea, constipation, melena, or blood in the stool  She has had an elevated alkaline phosphatase dating back to 2016  She denies ever having workup for this in the past   She denies any personal or family history of liver disease  She denies any personal history of kidney, intestinal, or bone disease  He denies any alcohol use  She denies any herbal supplements or nutritional supplements  Patient's last colonoscopy was in 2016 Patient's last endoscopy was 2017       Review of Systems:   General: negative for fatigue, fever, night sweats or unexpected weight loss  Psychological: negative for anxiety or depression  Ophthalmic: negative for blurry vision or scleral icterus  ENT: negative for headaches, oral lesions, sore throat, vocal changes, +dysphagia  Hematological and Lymphatic: negative for pallor or swollen lymph nodes  Respiratory: negative for cough, shortness of breath or wheezing  Cardiovascular: negative for chest pain, edema or murmur  Gastrointestinal: as mentioned in HPI  Genito-Urinary: negative for dysuria or incontinence  Musculoskeletal: negative for joint pain, joint stiffness or joint swelling  Dermatological: negative for pruritus, rash, or jaundice    Current Medications  Current Outpatient Prescriptions   Medication Sig Dispense Refill    amLODIPine (NORVASC) 2 5 mg tablet Take 1 tablet (2 5 mg total) by mouth daily 1/2 tablet daily 90 tablet 3    aspirin 81 MG tablet Take 81 mg by mouth daily at bedtime   atenolol (TENORMIN) 25 mg tablet Take 1 tablet (25 mg total) by mouth daily at bedtime 90 tablet 3    atorvastatin (LIPITOR) 10 mg tablet Take 1 tablet (10 mg total) by mouth daily at bedtime 90 tablet 1    Calcium-Vitamin D (CALTRATE 600 PLUS-VIT D PO) Take by mouth daily at bedtime   Fexofenadine HCl (ALLEGRA PO) Take 10 mg by mouth as needed   pantoprazole (PROTONIX) 40 mg tablet Take 1 tablet (40 mg total) by mouth 2 (two) times a day 60 tablet 2    PARoxetine (PAXIL) 20 mg tablet Take 1 tablet (20 mg total) by mouth daily 90 tablet 1    ranitidine (ZANTAC) 150 mg tablet Take 150 mg by mouth as needed for heartburn      VENTOLIN  (90 Base) MCG/ACT inhaler Inhale 2 puffs every 6 (six) hours as needed for wheezing 3 Inhaler 3    zolpidem (AMBIEN) 5 mg tablet Take 1 tablet (5 mg total) by mouth daily at bedtime as needed for sleep 30 tablet 0    ferrous sulfate 325 (65 Fe) mg tablet Take 1 tablet by mouth daily with breakfast for 30 days 30 tablet 0    ketorolac (ACULAR) 0 5 % ophthalmic solution Administer 1 drop into the left eye 4 (four) times a day for 30 days 6 mL 0     No current facility-administered medications for this visit  Past Medical History  Past Medical History:   Diagnosis Date    Abnormal blood chemistry     abnormal biochemistry findings    Abnormal findings on diagnostic imaging of urinary organs     resolved 07/01/2016    Abnormal glucose     resolved 07/01/2016    Abnormal thyroid exam     resolved 07/01/2016    Abnormal thyroid screen (blood)     resolved 07/01/2016    Abnormal TSH     last assessed 03/07/2016    Allergic rhinitis     last assessed 06/25/2015    Arthritis     Closed Colles' fracture     right wrist, last assessed 01/17/2014    Coronary artery disease     "blockages" sees Dr Sheryle Foster   [facility] Depression     with insomnia    Diverticulosis     occ diverticulitis    Edema     last assessed 03/11/2015    Hematuria     last assessed 11/07/2013    Hiatal hernia     History of transfusion     had 2 units-11/16    Hyperlipidemia     Hypertension     Iron deficiency anemia     chronic-receives iron infusion usually every 6 months    Ischemic colitis ([facility] [address] )     last assessed 12/07/2016    Knee mass     last assessed 06/11/2014    Orthostatic hypotension     last assessed 03/26/2014    Osteoarthrosis of knee     last assessed 11/12/2014    Palpitations     PMB (postmenopausal bleeding)     last assessed 11/07/2013    Polyarthralgia     last assessed 06/09/2014    Posthemorrhagic anemia     last assessed 11/07/2013       Past Surgical History  Past Surgical History:   Procedure Laterality Date    APPENDECTOMY      BREAST SURGERY Bilateral     exc  lashawn masses-benign    CATARACT EXTRACTION W/ INTRAOCULAR LENS IMPLANT Right 2/6/2017    Procedure: EXTRACTION EXTRACAPSULAR CATARACT PHACO INTRAOCULAR LENS (IOL); Surgeon: Roddy Reardon MD;  Location: [facility] MAIN OR;  Service:     CATARACT EXTRACTION W/ INTRAOCULAR LENS IMPLANT Left 1/9/2017    Procedure: EXTRACTION EXTRACAPSULAR CATARACT PHACO INTRAOCULAR LENS (IOL); Surgeon: Roddy Reardon MD;  Location: [facility] MAIN OR;  Service:     CHOLECYSTECTOMY      lap    COLONOSCOPY N/A 12/3/2016    Procedure: COLONOSCOPY;  Surgeon: Roxanne Ambriz MD;  Location: [place] [address] GI LAB; Service:     CYSTOSCOPY  06/2011    bladder biopsy, lashawn  retrogrades    DILATION AND CURETTAGE OF UTERUS      x2    ESOPHAGOGASTRODUODENOSCOPY N/A 12/2/2016    Procedure: ESOPHAGOGASTRODUODENOSCOPY (EGD); Surgeon: Marcy Leone MD;  Location: [facility] GI LAB; Service:     ESOPHAGOGASTRODUODENOSCOPY N/A 6/5/2017    Procedure: ESOPHAGOGASTRODUODENOSCOPY (EGD); Surgeon: Roxanne Ambriz MD;  Location: [facility] GI LAB;   Service:     FRACTURE SURGERY Right     distal radius repair w/hardware    HAND TENDON SURGERY Right     laceration repair    HERNIA REPAIR      umbilical    KNEE SURGERY Right 07/03/2014    mass removed    TOTAL SHOULDER ARTHROPLASTY Right 7/14/2016    Procedure: ARTHROPLASTY SHOULDER with subacromial decompression, distal clavicle excision and possible rotator cuff repair,limited debridement of laboral tear;  Surgeon: Tino Earl MD;  Location: [facility] MAIN OR;  Service:     WRIST SURGERY Right 12/2013    repair fracture with hardware       Past Social History   Social History     Social History    Marital status:       Spouse name: N/A    Number of children: N/A    Years of education: N/A     Social History Main Topics    Smoking status: Never Smoker    Smokeless tobacco: Never Used    Alcohol use No    Drug use: No    Sexual activity: Not Asked     Other Topics Concern    None     Social History Narrative    Daily coffee consumption       The following portions of the patient's history were reviewed and updated as appropriate: allergies, current medications, past family history, past medical history, past social history, past surgical history and problem list     Vital Signs  Vitals:    10/31/18 1303   BP: 142/88   BP Location: Right arm   Patient Position: Sitting   Cuff Size: Standard   Pulse: 72   Temp: 98 3 °F (36 8 °C)   TempSrc: Tympanic   Weight: 85 5 kg (188 lb 6 4 oz)   Height: 5' 7" (1 702 m)       Physical Exam:  General appearance: alert, cooperative, no distress  HEENT: normocephalic, anicteric, no eye erythema or discharge, no oropharyngeal thrush  Neck: supple, trachea midline, no adenopathy  Lungs: CTA b/l, no rales, rhonchi, or wheezing, unlabored respirations  Heart: RRR, no murmur, rubs, or gallops  Abdomen: soft, non-tender, non-distended, normal bowel sounds, no masses or organomegaly  Rectal: deferred  Extremities: no cyanosis, clubbing, or edema  Musculoskeletal: normal gait  Skin: color and texture normal, no jaundice, no rashes or lesions  Psychiatric: alert and oriented, normal affect and behavior

## 2018-11-01 ENCOUNTER — APPOINTMENT (OUTPATIENT)
Dept: LAB | Facility: HOSPITAL | Age: 73
End: 2018-11-01
Payer: MEDICARE

## 2018-11-01 ENCOUNTER — TRANSCRIBE ORDERS (OUTPATIENT)
Dept: ADMINISTRATIVE | Facility: HOSPITAL | Age: 73
End: 2018-11-01

## 2018-11-01 DIAGNOSIS — R74.8 ELEVATED ALKALINE PHOSPHATASE LEVEL: ICD-10-CM

## 2018-11-01 LAB
ALBUMIN SERPL BCP-MCNC: 3.3 G/DL (ref 3.5–5)
ALP SERPL-CCNC: 238 U/L (ref 46–116)
ALT SERPL W P-5'-P-CCNC: 20 U/L (ref 12–78)
AST SERPL W P-5'-P-CCNC: 13 U/L (ref 5–45)
BILIRUB DIRECT SERPL-MCNC: 0.2 MG/DL (ref 0–0.2)
BILIRUB SERPL-MCNC: 0.6 MG/DL (ref 0.2–1)
PROT SERPL-MCNC: 6.8 G/DL (ref 6.4–8.2)

## 2018-11-01 PROCEDURE — 80076 HEPATIC FUNCTION PANEL: CPT

## 2018-11-01 PROCEDURE — 36415 COLL VENOUS BLD VENIPUNCTURE: CPT

## 2018-11-01 PROCEDURE — 84075 ASSAY ALKALINE PHOSPHATASE: CPT

## 2018-11-01 PROCEDURE — 84080 ASSAY ALKALINE PHOSPHATASES: CPT

## 2018-11-02 LAB
ALP BONE CFR SERPL: 15 % (ref 14–68)
ALP INTEST CFR SERPL: 1 % (ref 0–18)
ALP LIVER CFR SERPL: 84 % (ref 18–85)
ALP SERPL-CCNC: 245 IU/L (ref 39–117)

## 2018-11-05 ENCOUNTER — TELEPHONE (OUTPATIENT)
Dept: GASTROENTEROLOGY | Facility: AMBULARY SURGERY CENTER | Age: 73
End: 2018-11-05

## 2018-11-05 DIAGNOSIS — R74.8 ELEVATED ALKALINE PHOSPHATASE LEVEL: Primary | ICD-10-CM

## 2018-11-05 NOTE — TELEPHONE ENCOUNTER
----- Message from Gurvinder Morales PA-C sent at 11/5/2018  1:33 PM EST -----  Please inform patient that all of her liver numbers are normal except for the alkaline phosphatase which we discussed during her office visit  A large percentage of this is coming from the liver as I suspected  I will order an ultrasound to further evaluate her liver  Depending on these results, the patient may need to be considered for a liver biopsy in the future to determine why these number is elevated  I will wait her ultrasound 1st and then decide whether not she needs a biopsy at that time  Please mail patient script for right upper quadrant ultrasound

## 2018-11-17 ENCOUNTER — HOSPITAL ENCOUNTER (OUTPATIENT)
Dept: RADIOLOGY | Facility: HOSPITAL | Age: 73
Discharge: HOME | End: 2018-11-17
Payer: MEDICARE

## 2018-11-17 DIAGNOSIS — R74.8 ELEVATED ALKALINE PHOSPHATASE LEVEL: ICD-10-CM

## 2018-11-17 PROCEDURE — 76705 ECHO EXAM OF ABDOMEN: CPT

## 2018-11-19 DIAGNOSIS — G47.00 INSOMNIA, UNSPECIFIED TYPE: ICD-10-CM

## 2018-11-19 RX ORDER — ZOLPIDEM TARTRATE 5 MG/1
5 TABLET ORAL
Qty: 30 TABLET | Refills: 0 | Status: SHIPPED | OUTPATIENT
Start: 2018-11-19 | End: 2018-12-19 | Stop reason: SDUPTHER

## 2018-11-20 DIAGNOSIS — R74.8 ELEVATED ALKALINE PHOSPHATASE LEVEL: Primary | ICD-10-CM

## 2018-11-23 ENCOUNTER — TELEPHONE (OUTPATIENT)
Dept: GASTROENTEROLOGY | Facility: CLINIC | Age: 73
End: 2018-11-23

## 2018-11-23 NOTE — TELEPHONE ENCOUNTER
Spoke to patient and gave results  Informed the patient of the labs to be done and she stated that she goes to the [facility] lab  I informed her that the labs were in the computer and the lab could print them when she got there  She will have them done

## 2018-11-23 NOTE — TELEPHONE ENCOUNTER
----- Message from Heri Mace PA-C sent at 11/20/2018  8:24 AM EST -----  Please inform patient that her ultrasound shows a hemangioma (collection of blood vessels) in the liver as well as a cyst in the liver  These are both benign  Otherwise, her liver looks normal  We will do a complete blood work up to rule out sources of liver disease, specifically autoimmune sources for her elevated liver enzyme  Pending these results, we may need to proceed with liver biopsy  I discussed this plan with Dr Omi Powell  Please mail patient DAYLIN, AMA, ASMA, hepatitis panel, ceruloplasmin, and iron panel

## 2018-11-24 ENCOUNTER — TRANSCRIBE ORDERS (OUTPATIENT)
Dept: ADMINISTRATIVE | Facility: HOSPITAL | Age: 73
End: 2018-11-24

## 2018-11-24 ENCOUNTER — APPOINTMENT (OUTPATIENT)
Dept: LAB | Facility: HOSPITAL | Age: 73
End: 2018-11-24
Payer: MEDICARE

## 2018-11-24 DIAGNOSIS — R74.8 ELEVATED ALKALINE PHOSPHATASE LEVEL: ICD-10-CM

## 2018-11-24 LAB
FERRITIN SERPL-MCNC: 9 NG/ML (ref 8–388)
HBV CORE AB SER QL: NORMAL
HBV CORE IGM SER QL: NORMAL
HBV SURFACE AG SER QL: NORMAL
HCV AB SER QL: NORMAL
IRON SATN MFR SERPL: 13 %
IRON SERPL-MCNC: 52 UG/DL (ref 50–170)
TIBC SERPL-MCNC: 400 UG/DL (ref 250–450)

## 2018-11-24 PROCEDURE — 86704 HEP B CORE ANTIBODY TOTAL: CPT

## 2018-11-24 PROCEDURE — 83540 ASSAY OF IRON: CPT

## 2018-11-24 PROCEDURE — 82728 ASSAY OF FERRITIN: CPT

## 2018-11-24 PROCEDURE — 86803 HEPATITIS C AB TEST: CPT

## 2018-11-24 PROCEDURE — 36415 COLL VENOUS BLD VENIPUNCTURE: CPT

## 2018-11-24 PROCEDURE — 86038 ANTINUCLEAR ANTIBODIES: CPT

## 2018-11-24 PROCEDURE — 86235 NUCLEAR ANTIGEN ANTIBODY: CPT

## 2018-11-24 PROCEDURE — 87340 HEPATITIS B SURFACE AG IA: CPT

## 2018-11-24 PROCEDURE — 86705 HEP B CORE ANTIBODY IGM: CPT

## 2018-11-24 PROCEDURE — 83550 IRON BINDING TEST: CPT

## 2018-11-24 PROCEDURE — 86256 FLUORESCENT ANTIBODY TITER: CPT

## 2018-11-24 PROCEDURE — 82390 ASSAY OF CERULOPLASMIN: CPT

## 2018-11-25 LAB — CERULOPLASMIN SERPL-MCNC: 35.2 MG/DL (ref 19–39)

## 2018-11-26 LAB
ACTIN IGG SERPL-ACNC: 5 UNITS (ref 0–19)
MITOCHONDRIA M2 IGG SER-ACNC: 2.8 UNITS (ref 0–20)
RYE IGE QN: NEGATIVE

## 2018-11-27 DIAGNOSIS — R74.8 ELEVATED ALKALINE PHOSPHATASE LEVEL: Primary | ICD-10-CM

## 2018-12-11 ENCOUNTER — TELEPHONE (OUTPATIENT)
Dept: RADIOLOGY | Facility: HOSPITAL | Age: 73
End: 2018-12-11

## 2018-12-11 RX ORDER — SODIUM CHLORIDE 9 MG/ML
50 INJECTION, SOLUTION INTRAVENOUS CONTINUOUS
Status: CANCELLED | OUTPATIENT
Start: 2018-12-12

## 2018-12-11 NOTE — TELEPHONE ENCOUNTER
Reviewed pre op instructions with patient  Pt to be in [facility] at 0900 tomorrow for liver biopsy  NPO after midnight, may take meds with sips of water  Pt will need someone to drive her home, will be here 4 hours after procedure for monitoring  Pt aware of all instructions

## 2018-12-12 ENCOUNTER — HOSPITAL ENCOUNTER (OUTPATIENT)
Dept: NON INVASIVE DIAGNOSTICS | Facility: HOSPITAL | Age: 73
Discharge: HOME | End: 2018-12-12
Admitting: RADIOLOGY
Payer: MEDICARE

## 2018-12-12 VITALS
RESPIRATION RATE: 16 BRPM | HEART RATE: 60 BPM | WEIGHT: 190 LBS | TEMPERATURE: 98.6 F | HEIGHT: 67 IN | SYSTOLIC BLOOD PRESSURE: 150 MMHG | DIASTOLIC BLOOD PRESSURE: 73 MMHG | OXYGEN SATURATION: 95 % | BODY MASS INDEX: 29.82 KG/M2

## 2018-12-12 DIAGNOSIS — R74.8 ELEVATED ALKALINE PHOSPHATASE LEVEL: ICD-10-CM

## 2018-12-12 LAB
ERYTHROCYTE [DISTWIDTH] IN BLOOD BY AUTOMATED COUNT: 14.5 % (ref 11.6–15.1)
HCT VFR BLD AUTO: 39.7 % (ref 34.8–46.1)
HGB BLD-MCNC: 12.4 G/DL (ref 11.5–15.4)
INR PPP: 0.98 (ref 0.86–1.16)
MCH RBC QN AUTO: 27.1 PG (ref 26.8–34.3)
MCHC RBC AUTO-ENTMCNC: 31.2 G/DL (ref 31.4–37.4)
MCV RBC AUTO: 87 FL (ref 82–98)
PLATELET # BLD AUTO: 353 THOUSANDS/UL (ref 149–390)
PMV BLD AUTO: 9.4 FL (ref 8.9–12.7)
PROTHROMBIN TIME: 10.3 SECONDS (ref 9.4–11.7)
RBC # BLD AUTO: 4.58 MILLION/UL (ref 3.81–5.12)
WBC # BLD AUTO: 7.32 THOUSAND/UL (ref 4.31–10.16)

## 2018-12-12 PROCEDURE — 85610 PROTHROMBIN TIME: CPT | Performed by: RADIOLOGY

## 2018-12-12 PROCEDURE — 47000 NEEDLE BIOPSY OF LIVER PERQ: CPT

## 2018-12-12 PROCEDURE — 88307 TISSUE EXAM BY PATHOLOGIST: CPT | Performed by: PATHOLOGY

## 2018-12-12 PROCEDURE — 88342 IMHCHEM/IMCYTCHM 1ST ANTB: CPT | Performed by: PATHOLOGY

## 2018-12-12 PROCEDURE — 88341 IMHCHEM/IMCYTCHM EA ADD ANTB: CPT | Performed by: PATHOLOGY

## 2018-12-12 PROCEDURE — 85027 COMPLETE CBC AUTOMATED: CPT | Performed by: RADIOLOGY

## 2018-12-12 PROCEDURE — 88313 SPECIAL STAINS GROUP 2: CPT | Performed by: PATHOLOGY

## 2018-12-12 PROCEDURE — 76942 ECHO GUIDE FOR BIOPSY: CPT | Performed by: RADIOLOGY

## 2018-12-12 PROCEDURE — 99152 MOD SED SAME PHYS/QHP 5/>YRS: CPT | Performed by: RADIOLOGY

## 2018-12-12 PROCEDURE — 99152 MOD SED SAME PHYS/QHP 5/>YRS: CPT

## 2018-12-12 PROCEDURE — 47000 NEEDLE BIOPSY OF LIVER PERQ: CPT | Performed by: RADIOLOGY

## 2018-12-12 RX ORDER — DIPHENHYDRAMINE HYDROCHLORIDE 50 MG/ML
INJECTION INTRAMUSCULAR; INTRAVENOUS CODE/TRAUMA/SEDATION MEDICATION
Status: COMPLETED | OUTPATIENT
Start: 2018-12-12 | End: 2018-12-12

## 2018-12-12 RX ORDER — SODIUM CHLORIDE 9 MG/ML
50 INJECTION, SOLUTION INTRAVENOUS CONTINUOUS
Status: DISCONTINUED | OUTPATIENT
Start: 2018-12-12 | End: 2018-12-13 | Stop reason: HOSPADM

## 2018-12-12 RX ORDER — MIDAZOLAM HYDROCHLORIDE 1 MG/ML
INJECTION INTRAMUSCULAR; INTRAVENOUS CODE/TRAUMA/SEDATION MEDICATION
Status: COMPLETED | OUTPATIENT
Start: 2018-12-12 | End: 2018-12-12

## 2018-12-12 RX ORDER — LIDOCAINE HYDROCHLORIDE 10 MG/ML
INJECTION, SOLUTION INFILTRATION; PERINEURAL CODE/TRAUMA/SEDATION MEDICATION
Status: COMPLETED | OUTPATIENT
Start: 2018-12-12 | End: 2018-12-12

## 2018-12-12 RX ORDER — HYDROCODONE BITARTRATE AND ACETAMINOPHEN 5; 325 MG/1; MG/1
1 TABLET ORAL EVERY 6 HOURS PRN
Status: DISCONTINUED | OUTPATIENT
Start: 2018-12-12 | End: 2018-12-13 | Stop reason: HOSPADM

## 2018-12-12 RX ORDER — FENTANYL CITRATE 50 UG/ML
INJECTION, SOLUTION INTRAMUSCULAR; INTRAVENOUS CODE/TRAUMA/SEDATION MEDICATION
Status: COMPLETED | OUTPATIENT
Start: 2018-12-12 | End: 2018-12-12

## 2018-12-12 RX ADMIN — LIDOCAINE HYDROCHLORIDE 10 ML: 10 INJECTION, SOLUTION INFILTRATION; PERINEURAL at 11:10

## 2018-12-12 RX ADMIN — FENTANYL CITRATE 50 MCG: 50 INJECTION, SOLUTION INTRAMUSCULAR; INTRAVENOUS at 11:10

## 2018-12-12 RX ADMIN — MIDAZOLAM HYDROCHLORIDE 1 MG: 1 INJECTION, SOLUTION INTRAMUSCULAR; INTRAVENOUS at 11:10

## 2018-12-12 RX ADMIN — FENTANYL CITRATE 50 MCG: 50 INJECTION, SOLUTION INTRAMUSCULAR; INTRAVENOUS at 11:08

## 2018-12-12 RX ADMIN — DIPHENHYDRAMINE HYDROCHLORIDE 50 MG: 50 INJECTION INTRAMUSCULAR; INTRAVENOUS at 11:11

## 2018-12-12 RX ADMIN — MIDAZOLAM HYDROCHLORIDE 1 MG: 1 INJECTION, SOLUTION INTRAMUSCULAR; INTRAVENOUS at 11:07

## 2018-12-12 NOTE — BRIEF OP NOTE (RAD/CATH)
IR IMAGE GUIDED BIOPSY/ASPIRATION LIVER  Procedure Note    PATIENT NAME: Michele Danielson  : 1945  MRN: [id number]     Pre-op Diagnosis:   1  Elevated alkaline phosphatase level      Post-op Diagnosis:   1  Elevated alkaline phosphatase level        Surgeon:   Latoya Galicia MD  Assistants:     No qualified resident was available, Resident is only observing    Estimated Blood Loss: minimal     Findings:     Right lobe liver random core biopsy  No post biopsy hemorrhage seen  D stat used  Specimens: 3 x 18 gauge core biopsy samples       Complications:  none    Anesthesia: Conscious sedation and Maree Dakin, MD     Date: 2018  Time: 11:17 AM

## 2018-12-12 NOTE — PERIOPERATIVE NURSING NOTE
received from [facility]u fully awake and alert  Dressing to right upper chest wall dry and intact    Resting quietly  Denies pain or discomfort  Refusing po fluids at this time

## 2018-12-12 NOTE — DISCHARGE INSTRUCTIONS
Percutaneous Liver Biopsy   WHAT YOU NEED TO KNOW:   Percutaneous liver biopsy is a procedure to remove a small tissue sample from your liver  A healthcare provider will insert a needle through your skin in the upper right part of your abdomen  Samples will be sent to a lab for be tested to find the cause of or to help monitor your liver condition  DISCHARGE INSTRUCTIONS:   Follow up with your healthcare provider as directed:  Write down your questions so you remember to ask them during your visits  Self-care:      · Rest in bed the day of your biopsy  · Do not drive the day of your biopsy  · Do not exercise or lift heavy things for at least 48 hours after your biopsy  Ask when you can return to your normal activities  Wound care: You may be able to remove your bandage or dressing 24 hours after procedure  Contact your healthcare provider if:   · You have a fever  · You feel more tired than normal     · You have nausea or are vomiting  · You have questions or concerns about your condition or care  Seek care immediately or call 911 if:   · You feel lightheaded, weak, or dizzy  · Blood soaks through your bandage  · You have increasing pain in your abdomen, chest, or right shoulder  · You vomit blood, or see blood in your bowel movement  · You suddenly have trouble breathing  · Your abdomen becomes swollen  · Your heart beats faster than what is normal for you  [facility] Interventional Radiology  [phone] Procedural Sedation   WHAT YOU NEED TO KNOW:   Procedural sedation is medicine used during procedures to help you feel relaxed and calm  You will remember little to none of the procedure  After sedation you may feel tired, weak, or unsteady on your feet  You may also have trouble concentrating or short-term memory loss  These symptoms should go away in 24 hours or less     DISCHARGE INSTRUCTIONS:     Call 911 or have someone else call for any of the following:   · You have sudden trouble breathing      · You cannot be woken  Contact Interventional Radiology at [phone]   [facility] PATIENTS: Contact Interventional Radiology at [phone]) Johnny Gomez PATIENTS: Contact Interventional Radiology at [phone]) if any of the following occur:     · You have a severe headache or dizziness      · Your heart is beating faster than usual     · You have a fever or chills      · Your skin is itchy, swollen, or you have a rash      · You have nausea or are vomiting for more than 8 hours after the procedure       · You have questions or concerns about your condition or care  Self-care:   · Have someone stay with you for 24 hours  This person can drive you to errands and help you do things around the house  This person can also watch for problems       · Rest and do quiet activities for 24 hours  Do not exercise, ride a bike, or play sports  Stand up slowly to prevent dizziness and falls  Take short walks around the house with another person  Slowly return to your usual activities the next day       · Do not drive or use dangerous machines or tools for 24 hours  You may injure yourself or others  Examples include a lawnmower, saw, or drill  Do not return to work for 24 hours if you use dangerous machines or tools for work       · Do not make important decisions for 24 hours  For example, do not sign important papers or invest money       · Drink liquids as directed  Liquids help flush the sedation medicine out of your body  Ask how much liquid to drink each day and which liquids are best for you       · Eat small, frequent meals to prevent nausea and vomiting  Start with clear liquids such as juice or broth  If you do not vomit after clear liquids, you can eat your usual foods       · Do not drink alcohol or take medicines that make you drowsy  This includes medicines that help you sleep and anxiety medicines   Ask your healthcare provider if it is safe for you to take pain medicine  Follow up with your healthcare provider as directed: Write down your questions so you remember to ask them during your visits  8 am -4 pm, after hours or on weekend call your ordering Physician

## 2018-12-12 NOTE — SEDATION DOCUMENTATION
Procedure ended, pt tolerated with sedation  3 cores taken with 18x15 biopense  Pt returned to [facility] via stretcher, bedrest for 4 hours

## 2018-12-19 DIAGNOSIS — G47.00 INSOMNIA, UNSPECIFIED TYPE: ICD-10-CM

## 2018-12-19 RX ORDER — ZOLPIDEM TARTRATE 5 MG/1
5 TABLET ORAL
Qty: 30 TABLET | Refills: 1 | Status: SHIPPED | OUTPATIENT
Start: 2018-12-19 | End: 2019-02-16 | Stop reason: SDUPTHER

## 2019-01-02 NOTE — PROGRESS NOTES
Subjective:      Patient ID: Mariana Rowley is a 72 y o  female  Chief Complaint   Patient presents with   [facility] [place] Wellness Visit     lj       Here for follow up of hypertension, hyperlipidemia, depression  Feels depression symptoms are well controlled on SSRI and she does not want to change at this time  Denies side effects, no SI/HI  Taking all meds as prescribed  Had recent bloodwork for workup of elevated ALP but no cholesterol or glucose  Denies chest pain, dyspnea, orthopnea, edema  The following portions of the patient's history were reviewed and updated as appropriate: allergies, current medications, past family history, past medical history, past social history, past surgical history and problem list     Review of Systems   Constitutional: Negative  Respiratory: Negative  Cardiovascular: Negative  Current Outpatient Prescriptions   Medication Sig Dispense Refill    amLODIPine (NORVASC) 2 5 mg tablet Take 1 tablet (2 5 mg total) by mouth daily 1/2 tablet daily 90 tablet 3    aspirin 81 MG tablet Take 81 mg by mouth daily at bedtime   atenolol (TENORMIN) 25 mg tablet Take 1 tablet (25 mg total) by mouth daily at bedtime 90 tablet 3    atorvastatin (LIPITOR) 10 mg tablet Take 1 tablet (10 mg total) by mouth daily at bedtime 90 tablet 3    Calcium-Vitamin D (CALTRATE 600 PLUS-VIT D PO) Take by mouth daily at bedtime   Fexofenadine HCl (ALLEGRA PO) Take 10 mg by mouth as needed        pantoprazole (PROTONIX) 40 mg tablet Take 1 tablet (40 mg total) by mouth 2 (two) times a day 60 tablet 2    PARoxetine (PAXIL) 20 mg tablet Take 1 tablet (20 mg total) by mouth daily 90 tablet 3    ranitidine (ZANTAC) 150 mg tablet Take 150 mg by mouth as needed for heartburn      VENTOLIN  (90 Base) MCG/ACT inhaler Inhale 2 puffs every 6 (six) hours as needed for wheezing 3 Inhaler 3    zolpidem (AMBIEN) 5 mg tablet Take 1 tablet (5 mg total) by mouth daily at bedtime as needed for sleep 30 tablet 1     No current facility-administered medications for this visit  Objective:    /80   Pulse 84   Temp (!) 96 6 °F (35 9 °C)   Resp 16   Ht 5' 7" (1 702 m)   Wt 85 7 kg (189 lb)   BMI 29 60 kg/m²        Physical Exam   Constitutional: She appears well-developed and well-nourished  Eyes: Conjunctivae are normal    Neck: Neck supple  No JVD present  No thyromegaly present  Cardiovascular: Normal rate, regular rhythm, normal heart sounds and intact distal pulses  Exam reveals no gallop and no friction rub  No murmur heard  Pulmonary/Chest: Effort normal and breath sounds normal  She has no wheezes  She has no rales  Abdominal: Soft  Bowel sounds are normal  She exhibits no distension  There is no tenderness  Musculoskeletal: She exhibits no edema  Assessment/Plan:    Benign essential hypertension  Stable and well controlled on amlodipine  Hyperlipidemia  She is tolerating atorvastatin without side effects and will check lipid profile  Depression  Stable on SSRI and will continue  Diagnoses and all orders for this visit:    Medicare annual wellness visit, subsequent    Benign essential hypertension    Hyperlipidemia, unspecified hyperlipidemia type  -     Lipid panel; Future  -     HEMOGLOBIN A1C W/ EAG ESTIMATION; Future  -     atorvastatin (LIPITOR) 10 mg tablet; Take 1 tablet (10 mg total) by mouth daily at bedtime    Depression, unspecified depression type  -     PARoxetine (PAXIL) 20 mg tablet; Take 1 tablet (20 mg total) by mouth daily    Elevated glucose  -     HEMOGLOBIN A1C W/ EAG ESTIMATION; Future          Return in about 6 months (around 7/7/2019)         Elise Nuñez MD

## 2019-01-07 ENCOUNTER — OFFICE VISIT (OUTPATIENT)
Dept: FAMILY MEDICINE CLINIC | Facility: CLINIC | Age: 74
End: 2019-01-07
Payer: MEDICARE

## 2019-01-07 VITALS
SYSTOLIC BLOOD PRESSURE: 126 MMHG | DIASTOLIC BLOOD PRESSURE: 80 MMHG | BODY MASS INDEX: 29.66 KG/M2 | HEART RATE: 84 BPM | TEMPERATURE: 96.6 F | HEIGHT: 67 IN | WEIGHT: 189 LBS | RESPIRATION RATE: 16 BRPM

## 2019-01-07 DIAGNOSIS — R73.09 ELEVATED GLUCOSE: ICD-10-CM

## 2019-01-07 DIAGNOSIS — Z00.00 MEDICARE ANNUAL WELLNESS VISIT, SUBSEQUENT: Primary | ICD-10-CM

## 2019-01-07 DIAGNOSIS — E78.5 HYPERLIPIDEMIA, UNSPECIFIED HYPERLIPIDEMIA TYPE: ICD-10-CM

## 2019-01-07 DIAGNOSIS — I10 BENIGN ESSENTIAL HYPERTENSION: ICD-10-CM

## 2019-01-07 DIAGNOSIS — F32.A DEPRESSION, UNSPECIFIED DEPRESSION TYPE: ICD-10-CM

## 2019-01-07 PROCEDURE — G0439 PPPS, SUBSEQ VISIT: HCPCS | Performed by: INTERNAL MEDICINE

## 2019-01-07 PROCEDURE — 99214 OFFICE O/P EST MOD 30 MIN: CPT | Performed by: INTERNAL MEDICINE

## 2019-01-07 RX ORDER — ATORVASTATIN CALCIUM 10 MG/1
10 TABLET, FILM COATED ORAL
Qty: 90 TABLET | Refills: 3 | Status: SHIPPED | OUTPATIENT
Start: 2019-01-07 | End: 2020-04-14 | Stop reason: SDUPTHER

## 2019-01-07 RX ORDER — PAROXETINE HYDROCHLORIDE 20 MG/1
20 TABLET, FILM COATED ORAL DAILY
Qty: 90 TABLET | Refills: 3 | Status: SHIPPED | OUTPATIENT
Start: 2019-01-07 | End: 2020-02-03 | Stop reason: SDUPTHER

## 2019-01-07 NOTE — PROGRESS NOTES
Assessment and Plan:    Problem List Items Addressed This Visit        Cardiovascular and Mediastinum    Benign essential hypertension       Other    Hyperlipidemia    Relevant Medications    atorvastatin (LIPITOR) 10 mg tablet    Other Relevant Orders    Lipid panel    HEMOGLOBIN A1C W/ EAG ESTIMATION    Depression    Relevant Medications    PARoxetine (PAXIL) 20 mg tablet      Other Visit Diagnoses     Medicare annual wellness visit, subsequent    -  Primary    Elevated glucose        Relevant Orders    HEMOGLOBIN A1C W/ EAG ESTIMATION        Health Maintenance Due   Topic Date Due    Medicare Annual Wellness Visit (AWV)  1945    INFLUENZA VACCINE  07/01/2018         HPI:  Ras Montez is a 72 y o  female here for her Subsequent Wellness Visit  Patient Active Problem List   Diagnosis    Anemia    [facility] [address] [facility]    Benign essential hypertension    Asymptomatic stenosis of left carotid artery    Hyperlipidemia    Mild pulmonary hypertension ([facility] [address] [facility]    Asthma    Depression    Hiatal hernia    Primary insomnia    Vitamin B 12 deficiency     Past Medical History:   Diagnosis Date    Abnormal blood chemistry     abnormal biochemistry findings    Abnormal findings on diagnostic imaging of urinary organs     resolved 07/01/2016    Abnormal glucose     resolved 07/01/2016    Abnormal thyroid exam     resolved 07/01/2016    Abnormal thyroid screen (blood)     resolved 07/01/2016    Abnormal TSH     last assessed 03/07/2016    Allergic rhinitis     last assessed 06/25/2015    Arthritis     Closed Colles' fracture     right wrist, last assessed 01/17/2014    Coronary artery disease     "blockages" sees Dr Rayna Mead   Ilana Sizer Depression     with insomnia    Diverticulosis     occ diverticulitis    Edema     last assessed 03/11/2015    Hematuria     last assessed 11/07/2013    Hiatal hernia     History of transfusion     had 2 units-11/16    Hyperlipidemia     Hypertension     Iron deficiency anemia     chronic-receives iron infusion usually every 6 months    Ischemic colitis ([facility] [address] )     last assessed 12/07/2016    Knee mass     last assessed 06/11/2014    Orthostatic hypotension     last assessed 03/26/2014    Osteoarthrosis of knee     last assessed 11/12/2014    Palpitations     PMB (postmenopausal bleeding)     last assessed 11/07/2013    Polyarthralgia     last assessed 06/09/2014    Posthemorrhagic anemia     last assessed 11/07/2013     Past Surgical History:   Procedure Laterality Date    APPENDECTOMY      BREAST SURGERY Bilateral     exc  lashawn masses-benign    CATARACT EXTRACTION W/ INTRAOCULAR LENS IMPLANT Right 2/6/2017    Procedure: EXTRACTION EXTRACAPSULAR CATARACT PHACO INTRAOCULAR LENS (IOL); Surgeon: Say Espinoza MD;  Location: [facility] MAIN OR;  Service:     CATARACT EXTRACTION W/ INTRAOCULAR LENS IMPLANT Left 1/9/2017    Procedure: EXTRACTION EXTRACAPSULAR CATARACT PHACO INTRAOCULAR LENS (IOL); Surgeon: Say Espinoza MD;  Location: [facility] MAIN OR;  Service:     CHOLECYSTECTOMY      lap    COLONOSCOPY N/A 12/3/2016    Procedure: COLONOSCOPY;  Surgeon: Heri Sandoval MD;  Location: [place] [address] GI LAB; Service:     CYSTOSCOPY  06/2011    bladder biopsy, lashawn  retrogrades    DILATION AND CURETTAGE OF UTERUS      x2    ESOPHAGOGASTRODUODENOSCOPY N/A 12/2/2016    Procedure: ESOPHAGOGASTRODUODENOSCOPY (EGD); Surgeon: Shaka Kessler MD;  Location: [facility] GI LAB; Service:     ESOPHAGOGASTRODUODENOSCOPY N/A 6/5/2017    Procedure: ESOPHAGOGASTRODUODENOSCOPY (EGD); Surgeon: Heri Sandoval MD;  Location: [facility] GI LAB;   Service:     FRACTURE SURGERY Right     distal radius repair w/hardware    HAND TENDON SURGERY Right     laceration repair    HERNIA REPAIR      umbilical    IR IMAGE GUIDED BIOPSY/ASPIRATION LIVER  12/12/2018    KNEE SURGERY Right 07/03/2014    mass removed    TOTAL SHOULDER ARTHROPLASTY Right 7/14/2016    Procedure: ARTHROPLASTY SHOULDER with subacromial decompression, distal clavicle excision and possible rotator cuff repair,limited debridement of laboral tear;  Surgeon: Rosaura Estevez MD;  Location: [facility] MAIN OR;  Service:     WRIST SURGERY Right 12/2013    repair fracture with hardware     Family History   Problem Relation Age of Onset   [de-identified] Cancer Father [de-identified]        colon     Hypertension Father     Cancer Paternal Grandmother         breast    Stroke Mother         TIA     History   Smoking Status    Never Smoker   Smokeless Tobacco    Never Used     History   Alcohol Use No      History   Drug Use No       Current Outpatient Prescriptions   Medication Sig Dispense Refill    amLODIPine (NORVASC) 2 5 mg tablet Take 1 tablet (2 5 mg total) by mouth daily 1/2 tablet daily 90 tablet 3    aspirin 81 MG tablet Take 81 mg by mouth daily at bedtime   atenolol (TENORMIN) 25 mg tablet Take 1 tablet (25 mg total) by mouth daily at bedtime 90 tablet 3    atorvastatin (LIPITOR) 10 mg tablet Take 1 tablet (10 mg total) by mouth daily at bedtime 90 tablet 3    Calcium-Vitamin D (CALTRATE 600 PLUS-VIT D PO) Take by mouth daily at bedtime   Fexofenadine HCl (ALLEGRA PO) Take 10 mg by mouth as needed   pantoprazole (PROTONIX) 40 mg tablet Take 1 tablet (40 mg total) by mouth 2 (two) times a day 60 tablet 2    PARoxetine (PAXIL) 20 mg tablet Take 1 tablet (20 mg total) by mouth daily 90 tablet 3    ranitidine (ZANTAC) 150 mg tablet Take 150 mg by mouth as needed for heartburn      VENTOLIN  (90 Base) MCG/ACT inhaler Inhale 2 puffs every 6 (six) hours as needed for wheezing 3 Inhaler 3    zolpidem (AMBIEN) 5 mg tablet Take 1 tablet (5 mg total) by mouth daily at bedtime as needed for sleep 30 tablet 1     No current facility-administered medications for this visit        Allergies   Allergen Reactions    Shellfish-Derived Products Anaphylaxis     seafood    Sulfa Antibiotics Anaphylaxis     Immunization History   Administered Date(s) Administered    Influenza Split High Dose Preservative Free IM 12/05/2016, 10/01/2017    Influenza, Quadrivalent (nasal) 12/05/2016    Pneumococcal Conjugate 13-Valent 12/07/2016    Pneumococcal Polysaccharide PPV23 11/12/2014    Tdap 11/22/2010    Tetanus, adsorbed 08/01/2011    Tuberculin Skin Test-PPD Intradermal 11/07/2011       Patient Care Team:  Debbie Martinez MD as PCP - MD Luis Eric MD Dwane Span, MD Dorcas Sharp, MD    Medicare Screening Tests and Risk Assessments:  Elena Lacy is here for her Subsequent Wellness visit  Health Risk Assessment:  Patient rates overall health as good  Patient feels that their physical health rating is Same  Eyesight was rated as Slightly worse  Hearing was rated as Same  Patient feels that their emotional and mental health rating is Same  Pain experienced by patient in the last 7 days has been None  Patient states that she has experienced no weight loss or gain in last 6 months  Emotional/Mental Health:  Patient has been feeling nervous/anxious  PHQ-9 Depression Screening:    Frequency of the following problems over the past two weeks:      1  Little interest or pleasure in doing things: 0 - not at all      2  Feeling down, depressed, or hopeless: 0 - not at all  PHQ-2 Score: 0          Broken Bones/Falls: Fall Risk Assessment:    In the past year, patient has experienced: No history of falling in past year          Bladder/Bowel:  Patient has not leaked urine accidently in the last six months  Patient reports no loss of bowel control  Immunizations:  Patient has had a flu vaccination within the last year  Patient has received a pneumonia shot  Patient has not received a shingles shot  Patient has received tetanus/diphtheria shot  Home Safety:  Patient does not have trouble with stairs inside or outside of their home     Patient currently reports that there are no safety hazards present in home, working smoke alarms, no working carbon monoxide detectors  Preventative Screenings:   Breast cancer screening performed, colon cancer screen completed, cholesterol screen completed, glaucoma eye exam completed,     Nutrition:  Current diet: Limited junk food and No Added Salt with servings of the following:    Medications:  Patient is currently taking over-the-counter supplements  List of OTC medications includes: see med list--lj  Patient is able to manage medications  Lifestyle Choices:  Patient reports no tobacco use  Patient has not smoked or used tobacco in the past   Patient reports no alcohol use  Patient drives a vehicle  Patient wears seat belt  Current level of exercise of physical activity described by patient as: active-walks  Activities of Daily Living:  Can get out of bed by his or her self, able to dress self, able to make own meals, able to do own shopping, able to bathe self, can do own laundry/housekeeping, can manage own money, pay bills and track expenses    Previous Hospitalizations:  No hospitalization or ED visit in past 12 months        Advanced Directives:  Patient has decided on a power of   Patient has spoken to designated power of   Patient has not completed advanced directive          Preventative Screening/Counseling:      Cardiovascular:      General: Screening Current and Risks and Benefits Discussed      Counseling: Healthy Diet          Diabetes:      General: Risks and Benefits Discussed and Screening Current      Counseling: Improve Physical Activity          Colorectal Cancer:      General: Screening Current          Breast Cancer:      General: Risks and Benefits Discussed and Screening Current          Cervical Cancer:      General: Screening Not Indicated          Osteoporosis:      General: Patient Declines      Counseling: Calcium and Vitamin D Intake and Regular Weightbearing Exercise          AAA:      General: Screening Not Indicated          Glaucoma:      General: Screening Current          HIV:      General: Patient Declines          Hepatitis C:      General: Screening Current        Advanced Directives:   Patient has no living will for healthcare, does not have durable POA for healthcare,     Immunizations:      Influenza: Influenza UTD This Year      Pneumococcal: Lifetime Vaccine Completed      Shingrix: Patient Declines      Hepatitis B (Low risk patients): Series Not Indicated      TD: Td Vaccine UTD      Other Preventative Counseling (Non-Medicare):   Increase physical activity, Nutrition Counseling and Weight reduction discussed

## 2019-02-16 DIAGNOSIS — G47.00 INSOMNIA, UNSPECIFIED TYPE: ICD-10-CM

## 2019-02-17 RX ORDER — ZOLPIDEM TARTRATE 5 MG/1
5 TABLET ORAL
Qty: 30 TABLET | Refills: 1 | Status: SHIPPED | OUTPATIENT
Start: 2019-02-17 | End: 2019-04-18 | Stop reason: SDUPTHER

## 2019-03-25 ENCOUNTER — TRANSCRIBE ORDERS (OUTPATIENT)
Dept: ADMINISTRATIVE | Facility: HOSPITAL | Age: 74
End: 2019-03-25

## 2019-03-25 ENCOUNTER — APPOINTMENT (OUTPATIENT)
Dept: LAB | Facility: HOSPITAL | Age: 74
End: 2019-03-25
Attending: INTERNAL MEDICINE
Payer: MEDICARE

## 2019-03-25 DIAGNOSIS — E78.5 HYPERLIPIDEMIA, UNSPECIFIED HYPERLIPIDEMIA TYPE: ICD-10-CM

## 2019-03-25 DIAGNOSIS — R73.09 ELEVATED GLUCOSE: ICD-10-CM

## 2019-03-25 LAB
ALBUMIN SERPL BCP-MCNC: 3.2 G/DL (ref 3.5–5)
ALP SERPL-CCNC: 237 U/L (ref 46–116)
ALT SERPL W P-5'-P-CCNC: 43 U/L (ref 12–78)
ANION GAP SERPL CALCULATED.3IONS-SCNC: 8 MMOL/L (ref 4–13)
AST SERPL W P-5'-P-CCNC: 26 U/L (ref 5–45)
BILIRUB SERPL-MCNC: 0.7 MG/DL (ref 0.2–1)
BUN SERPL-MCNC: 13 MG/DL (ref 5–25)
CALCIUM SERPL-MCNC: 9.2 MG/DL (ref 8.3–10.1)
CHLORIDE SERPL-SCNC: 105 MMOL/L (ref 100–108)
CHOLEST SERPL-MCNC: 208 MG/DL (ref 50–200)
CO2 SERPL-SCNC: 30 MMOL/L (ref 21–32)
CREAT SERPL-MCNC: 0.74 MG/DL (ref 0.6–1.3)
EST. AVERAGE GLUCOSE BLD GHB EST-MCNC: 123 MG/DL
GFR SERPL CREATININE-BSD FRML MDRD: 82 ML/MIN/1.73SQ M
GLUCOSE P FAST SERPL-MCNC: 100 MG/DL (ref 65–99)
HBA1C MFR BLD: 5.9 % (ref 4.2–6.3)
HDLC SERPL-MCNC: 50 MG/DL (ref 40–60)
LDLC SERPL CALC-MCNC: 132 MG/DL (ref 0–100)
NONHDLC SERPL-MCNC: 158 MG/DL
POTASSIUM SERPL-SCNC: 3.7 MMOL/L (ref 3.5–5.3)
PROT SERPL-MCNC: 7 G/DL (ref 6.4–8.2)
SODIUM SERPL-SCNC: 143 MMOL/L (ref 136–145)
TRIGL SERPL-MCNC: 130 MG/DL

## 2019-03-25 PROCEDURE — 83036 HEMOGLOBIN GLYCOSYLATED A1C: CPT

## 2019-03-25 PROCEDURE — 36415 COLL VENOUS BLD VENIPUNCTURE: CPT

## 2019-03-25 PROCEDURE — 80053 COMPREHEN METABOLIC PANEL: CPT

## 2019-03-25 PROCEDURE — 80061 LIPID PANEL: CPT

## 2019-04-15 ENCOUNTER — TELEPHONE (OUTPATIENT)
Dept: GASTROENTEROLOGY | Facility: AMBULARY SURGERY CENTER | Age: 74
End: 2019-04-15

## 2019-04-15 DIAGNOSIS — K21.9 GASTROESOPHAGEAL REFLUX DISEASE, ESOPHAGITIS PRESENCE NOT SPECIFIED: ICD-10-CM

## 2019-04-15 RX ORDER — PANTOPRAZOLE SODIUM 40 MG/1
40 TABLET, DELAYED RELEASE ORAL 2 TIMES DAILY
Qty: 60 TABLET | Refills: 11 | Status: SHIPPED | OUTPATIENT
Start: 2019-04-15 | End: 2019-08-16 | Stop reason: SDUPTHER

## 2019-04-15 RX ORDER — PANTOPRAZOLE SODIUM 40 MG/1
40 TABLET, DELAYED RELEASE ORAL 2 TIMES DAILY
Qty: 60 TABLET | Refills: 2 | Status: SHIPPED | OUTPATIENT
Start: 2019-04-15 | End: 2019-04-15 | Stop reason: SDUPTHER

## 2019-04-18 DIAGNOSIS — G47.00 INSOMNIA, UNSPECIFIED TYPE: ICD-10-CM

## 2019-04-19 RX ORDER — ZOLPIDEM TARTRATE 5 MG/1
5 TABLET ORAL
Qty: 30 TABLET | Refills: 1 | Status: SHIPPED | OUTPATIENT
Start: 2019-04-19 | End: 2019-06-17 | Stop reason: SDUPTHER

## 2019-05-01 ENCOUNTER — OFFICE VISIT (OUTPATIENT)
Dept: FAMILY MEDICINE CLINIC | Facility: CLINIC | Age: 74
End: 2019-05-01
Payer: MEDICARE

## 2019-05-01 VITALS
HEART RATE: 84 BPM | HEIGHT: 67 IN | SYSTOLIC BLOOD PRESSURE: 152 MMHG | TEMPERATURE: 97.8 F | DIASTOLIC BLOOD PRESSURE: 78 MMHG | BODY MASS INDEX: 29.35 KG/M2 | WEIGHT: 187 LBS | RESPIRATION RATE: 16 BRPM

## 2019-05-01 DIAGNOSIS — N95.0 POST-MENOPAUSAL BLEEDING: Primary | ICD-10-CM

## 2019-05-01 PROCEDURE — 99213 OFFICE O/P EST LOW 20 MIN: CPT | Performed by: INTERNAL MEDICINE

## 2019-05-06 ENCOUNTER — HOSPITAL ENCOUNTER (OUTPATIENT)
Dept: RADIOLOGY | Facility: HOSPITAL | Age: 74
Discharge: HOME | End: 2019-05-06
Attending: INTERNAL MEDICINE
Payer: MEDICARE

## 2019-05-06 DIAGNOSIS — N95.0 POST-MENOPAUSAL BLEEDING: ICD-10-CM

## 2019-05-06 PROCEDURE — 76856 US EXAM PELVIC COMPLETE: CPT

## 2019-05-06 PROCEDURE — 76830 TRANSVAGINAL US NON-OB: CPT

## 2019-05-08 ENCOUNTER — TELEPHONE (OUTPATIENT)
Dept: FAMILY MEDICINE CLINIC | Facility: CLINIC | Age: 74
End: 2019-05-08

## 2019-05-09 ENCOUNTER — OFFICE VISIT (OUTPATIENT)
Dept: OBGYN CLINIC | Facility: CLINIC | Age: 74
End: 2019-05-09
Payer: MEDICARE

## 2019-05-09 VITALS
DIASTOLIC BLOOD PRESSURE: 84 MMHG | SYSTOLIC BLOOD PRESSURE: 130 MMHG | HEIGHT: 67 IN | BODY MASS INDEX: 28.72 KG/M2 | WEIGHT: 183 LBS

## 2019-05-09 DIAGNOSIS — N95.0 POST-MENOPAUSAL BLEEDING: Primary | ICD-10-CM

## 2019-05-09 PROCEDURE — 58100 BIOPSY OF UTERUS LINING: CPT | Performed by: NURSE PRACTITIONER

## 2019-05-09 PROCEDURE — 99202 OFFICE O/P NEW SF 15 MIN: CPT | Performed by: NURSE PRACTITIONER

## 2019-05-13 LAB — BIOPSY SPEC-IMP: ABNORMAL

## 2019-06-06 ENCOUNTER — OFFICE VISIT (OUTPATIENT)
Dept: OBGYN CLINIC | Facility: CLINIC | Age: 74
End: 2019-06-06
Payer: MEDICARE

## 2019-06-06 ENCOUNTER — OFFICE VISIT (OUTPATIENT)
Dept: CARDIOLOGY CLINIC | Facility: CLINIC | Age: 74
End: 2019-06-06
Payer: MEDICARE

## 2019-06-06 VITALS — DIASTOLIC BLOOD PRESSURE: 80 MMHG | SYSTOLIC BLOOD PRESSURE: 130 MMHG | WEIGHT: 183 LBS | BODY MASS INDEX: 28.66 KG/M2

## 2019-06-06 VITALS
OXYGEN SATURATION: 98 % | BODY MASS INDEX: 28.88 KG/M2 | DIASTOLIC BLOOD PRESSURE: 80 MMHG | WEIGHT: 184 LBS | SYSTOLIC BLOOD PRESSURE: 130 MMHG | HEART RATE: 88 BPM | HEIGHT: 67 IN

## 2019-06-06 DIAGNOSIS — Z01.818 PRE-OP EXAM: ICD-10-CM

## 2019-06-06 DIAGNOSIS — I27.20 MILD PULMONARY HYPERTENSION (HCC): ICD-10-CM

## 2019-06-06 DIAGNOSIS — R06.02 EXERTIONAL SHORTNESS OF BREATH: ICD-10-CM

## 2019-06-06 DIAGNOSIS — N85.02 COMPLEX ATYPICAL ENDOMETRIAL HYPERPLASIA: ICD-10-CM

## 2019-06-06 DIAGNOSIS — N95.0 POST-MENOPAUSAL BLEEDING: Primary | ICD-10-CM

## 2019-06-06 DIAGNOSIS — I65.22 ASYMPTOMATIC STENOSIS OF LEFT CAROTID ARTERY: ICD-10-CM

## 2019-06-06 DIAGNOSIS — E78.2 MIXED HYPERLIPIDEMIA: ICD-10-CM

## 2019-06-06 DIAGNOSIS — I10 BENIGN ESSENTIAL HYPERTENSION: ICD-10-CM

## 2019-06-06 PROCEDURE — 93000 ELECTROCARDIOGRAM COMPLETE: CPT | Performed by: INTERNAL MEDICINE

## 2019-06-06 PROCEDURE — 99213 OFFICE O/P EST LOW 20 MIN: CPT | Performed by: OBSTETRICS & GYNECOLOGY

## 2019-06-06 PROCEDURE — 99215 OFFICE O/P EST HI 40 MIN: CPT | Performed by: INTERNAL MEDICINE

## 2019-06-08 ENCOUNTER — HOSPITAL ENCOUNTER (OUTPATIENT)
Dept: NON INVASIVE DIAGNOSTICS | Facility: HOSPITAL | Age: 74
Discharge: HOME | End: 2019-06-08
Attending: INTERNAL MEDICINE
Payer: MEDICARE

## 2019-06-08 DIAGNOSIS — I27.20 MILD PULMONARY HYPERTENSION (HCC): ICD-10-CM

## 2019-06-08 DIAGNOSIS — R06.02 EXERTIONAL SHORTNESS OF BREATH: ICD-10-CM

## 2019-06-08 DIAGNOSIS — Z01.818 PRE-OP EXAM: ICD-10-CM

## 2019-06-08 DIAGNOSIS — I10 BENIGN ESSENTIAL HYPERTENSION: ICD-10-CM

## 2019-06-08 PROCEDURE — 93306 TTE W/DOPPLER COMPLETE: CPT

## 2019-06-10 ENCOUNTER — HOSPITAL ENCOUNTER (OUTPATIENT)
Dept: NON INVASIVE DIAGNOSTICS | Facility: HOSPITAL | Age: 74
Discharge: HOME | End: 2019-06-10
Attending: INTERNAL MEDICINE
Payer: MEDICARE

## 2019-06-10 DIAGNOSIS — R06.02 EXERTIONAL SHORTNESS OF BREATH: ICD-10-CM

## 2019-06-10 DIAGNOSIS — Z01.818 PRE-OP EXAM: ICD-10-CM

## 2019-06-10 DIAGNOSIS — I10 BENIGN ESSENTIAL HYPERTENSION: ICD-10-CM

## 2019-06-10 DIAGNOSIS — I27.20 MILD PULMONARY HYPERTENSION (HCC): ICD-10-CM

## 2019-06-10 LAB
CHEST PAIN STATEMENT: NORMAL
MAX DIASTOLIC BP: 80 MMHG
MAX HEART RATE: 136 BPM
MAX PREDICTED HEART RATE: 148 BPM
MAX. SYSTOLIC BP: 172 MMHG
PROTOCOL NAME: NORMAL
TARGET HR FORMULA: NORMAL
TEST INDICATION: NORMAL
TIME IN EXERCISE PHASE: NORMAL

## 2019-06-10 PROCEDURE — 93017 CV STRESS TEST TRACING ONLY: CPT

## 2019-06-11 ENCOUNTER — TELEPHONE (OUTPATIENT)
Dept: CARDIOLOGY CLINIC | Facility: CLINIC | Age: 74
End: 2019-06-11

## 2019-06-11 PROCEDURE — 93016 CV STRESS TEST SUPVJ ONLY: CPT | Performed by: INTERNAL MEDICINE

## 2019-06-11 PROCEDURE — 93306 TTE W/DOPPLER COMPLETE: CPT | Performed by: INTERNAL MEDICINE

## 2019-06-11 PROCEDURE — 93018 CV STRESS TEST I&R ONLY: CPT | Performed by: INTERNAL MEDICINE

## 2019-06-13 ENCOUNTER — TELEPHONE (OUTPATIENT)
Dept: CARDIOLOGY CLINIC | Facility: CLINIC | Age: 74
End: 2019-06-13

## 2019-06-17 DIAGNOSIS — G47.00 INSOMNIA, UNSPECIFIED TYPE: ICD-10-CM

## 2019-06-17 RX ORDER — ZOLPIDEM TARTRATE 5 MG/1
5 TABLET ORAL
Qty: 30 TABLET | Refills: 1 | Status: SHIPPED | OUTPATIENT
Start: 2019-06-17 | End: 2019-08-21 | Stop reason: SDUPTHER

## 2019-06-19 ENCOUNTER — OFFICE VISIT (OUTPATIENT)
Dept: OBGYN CLINIC | Facility: CLINIC | Age: 74
End: 2019-06-19

## 2019-06-19 VITALS — WEIGHT: 185 LBS | DIASTOLIC BLOOD PRESSURE: 90 MMHG | SYSTOLIC BLOOD PRESSURE: 130 MMHG | BODY MASS INDEX: 28.98 KG/M2

## 2019-06-19 DIAGNOSIS — N85.02 COMPLEX ATYPICAL ENDOMETRIAL HYPERPLASIA: Primary | ICD-10-CM

## 2019-06-19 DIAGNOSIS — Z01.818 PRE-OP EXAM: ICD-10-CM

## 2019-06-19 DIAGNOSIS — N95.0 POST-MENOPAUSAL BLEEDING: ICD-10-CM

## 2019-06-19 PROCEDURE — PREOP: Performed by: OBSTETRICS & GYNECOLOGY

## 2019-06-19 NOTE — H&P (VIEW-ONLY)
Assessment/Plan:    Preop for laparoscopic-assisted vaginal hysterectomy with bilateral salpingo-oophorectomy due to atypical complex hyperplasia on endometrial biopsy after an episode of postmenopausal bleeding  Preop evaluation done, consent signed previously       Problem List Items Addressed This Visit        Genitourinary    Complex atypical endometrial hyperplasia - Primary       Other    Post-menopausal bleeding    Pre-op exam            Subjective:      Patient ID: Jayme Camejo is a 69 y o  female  Chief complaint:  Here for evaluation prior to surgery    Cadence Biswas is here today for her preoperative visit  She is scheduled for a laparoscopic-assisted vaginal hysterectomy with bilateral salpingo-oophorectomy due to an episode of postmenopausal bleeding with biopsy proven complex atypical hyperplasia  We had previously reviewed the procedure with her and signed the consent  We can reviewed the risks and benefits anticipated postoperative course  In addition we also answered any further questions that she had  The following portions of the patient's history were reviewed and updated as appropriate:   She  has a past medical history of Abnormal blood chemistry, Abnormal findings on diagnostic imaging of urinary organs, Abnormal glucose, Abnormal thyroid exam, Abnormal thyroid screen (blood), Abnormal TSH, Allergic, Allergic rhinitis, Arthritis, Closed Colles' fracture, Coronary artery disease, Depression, Diverticulosis, Edema, Hematuria, Hiatal hernia, History of transfusion, Hyperlipidemia, Hypertension, Iron deficiency anemia, Ischemic colitis ([address] ), Knee mass, Orthostatic hypotension, Osteoarthrosis of knee, Palpitations, PMB (postmenopausal bleeding), Polyarthralgia, and Posthemorrhagic anemia    She   Patient Active Problem List    Diagnosis Date Noted    Complex atypical endometrial hyperplasia 06/06/2019    Pre-op exam 06/06/2019    Exertional shortness of breath 06/06/2019    Post-menopausal bleeding 05/09/2019    Asymptomatic stenosis of left carotid artery 08/03/2017    Mild pulmonary hypertension ([facility] [address] ) 08/03/2017    Hiatal hernia 01/05/2017    [facility] 12/05/2016    Anemia 12/01/2016    Vitamin B 12 deficiency 03/07/2016    Asthma 06/25/2015    Primary insomnia 02/18/2014    Benign essential hypertension 11/07/2013    Hyperlipidemia 11/07/2013    Depression 10/03/2013     She  has a past surgical history that includes Appendectomy; Knee surgery (Right, 07/03/2014); Wrist surgery (Right, 12/2013); Cholecystectomy; Dilation and curettage of uterus; Breast surgery (Bilateral); Hand tendon surgery (Right); Cystoscopy (06/2011); Hernia repair; Fracture surgery (Right); Cataract extraction w/ intraocular lens implant (Right, 2/6/2017); Cataract extraction w/ intraocular lens implant (Left, 1/9/2017); Colonoscopy (N/A, 12/3/2016); Esophagogastroduodenoscopy (N/A, 12/2/2016); Total shoulder arthroplasty (Right, 7/14/2016); Esophagogastroduodenoscopy (N/A, 6/5/2017); and IR image guided biopsy/aspiration liver (12/12/2018)  Her family history includes Cancer in her paternal grandmother; Cancer (age of onset: [de-identified]) in her father; Hypertension in her father; Stroke in her mother  She  reports that she has never smoked  She has never used smokeless tobacco  She reports that she does not drink alcohol or use drugs  Current Outpatient Medications   Medication Sig Dispense Refill    amLODIPine (NORVASC) 2 5 mg tablet Take 1 tablet (2 5 mg total) by mouth daily 1/2 tablet daily 90 tablet 3    aspirin 81 MG tablet Take 81 mg by mouth daily at bedtime   atenolol (TENORMIN) 25 mg tablet Take 1 tablet (25 mg total) by mouth daily at bedtime 90 tablet 3    atorvastatin (LIPITOR) 10 mg tablet Take 1 tablet (10 mg total) by mouth daily at bedtime 90 tablet 3    Calcium-Vitamin D (CALTRATE 600 PLUS-VIT D PO) Take by mouth daily at bedtime          Fexofenadine HCl (ALLEGRA PO) Take 10 mg by mouth as needed   pantoprazole (PROTONIX) 40 mg tablet Take 1 tablet (40 mg total) by mouth 2 (two) times a day 60 tablet 11    PARoxetine (PAXIL) 20 mg tablet Take 1 tablet (20 mg total) by mouth daily 90 tablet 3    ranitidine (ZANTAC) 150 mg tablet Take 150 mg by mouth as needed for heartburn      VENTOLIN  (90 Base) MCG/ACT inhaler Inhale 2 puffs every 6 (six) hours as needed for wheezing 3 Inhaler 3    zolpidem (AMBIEN) 5 mg tablet Take 1 tablet (5 mg total) by mouth daily at bedtime as needed for sleep 30 tablet 1     No current facility-administered medications for this visit  Current Outpatient Medications on File Prior to Visit   Medication Sig    amLODIPine (NORVASC) 2 5 mg tablet Take 1 tablet (2 5 mg total) by mouth daily 1/2 tablet daily    aspirin 81 MG tablet Take 81 mg by mouth daily at bedtime   atenolol (TENORMIN) 25 mg tablet Take 1 tablet (25 mg total) by mouth daily at bedtime    atorvastatin (LIPITOR) 10 mg tablet Take 1 tablet (10 mg total) by mouth daily at bedtime    Calcium-Vitamin D (CALTRATE 600 PLUS-VIT D PO) Take by mouth daily at bedtime   Fexofenadine HCl (ALLEGRA PO) Take 10 mg by mouth as needed   pantoprazole (PROTONIX) 40 mg tablet Take 1 tablet (40 mg total) by mouth 2 (two) times a day    PARoxetine (PAXIL) 20 mg tablet Take 1 tablet (20 mg total) by mouth daily    ranitidine (ZANTAC) 150 mg tablet Take 150 mg by mouth as needed for heartburn    VENTOLIN  (90 Base) MCG/ACT inhaler Inhale 2 puffs every 6 (six) hours as needed for wheezing    zolpidem (AMBIEN) 5 mg tablet Take 1 tablet (5 mg total) by mouth daily at bedtime as needed for sleep     No current facility-administered medications on file prior to visit  She is allergic to shellfish-derived products and sulfa antibiotics       Review of Systems   Constitutional: Negative for fatigue, fever and unexpected weight change     HENT: Negative for dental problem, sinus pressure and sinus pain  Eyes: Negative for visual disturbance  Respiratory: Negative for cough, shortness of breath and wheezing  Cardiovascular: Negative for chest pain and leg swelling  Gastrointestinal: Negative for blood in stool, constipation, diarrhea, nausea and vomiting  Endocrine: Negative for cold intolerance, heat intolerance and polydipsia  Genitourinary: Negative for dysuria, frequency, hematuria, menstrual problem and pelvic pain  Musculoskeletal: Negative for arthralgias and back pain  Neurological: Negative for dizziness, seizures and headaches  Psychiatric/Behavioral: The patient is not nervous/anxious  Objective: There were no vitals taken for this visit  Physical Exam   Constitutional: She is oriented to person, place, and time  She appears well-developed and well-nourished  No distress  Older white female   HENT:   Head: Normocephalic and atraumatic  Cardiovascular: Normal rate, regular rhythm and normal heart sounds  No murmur heard  Pulmonary/Chest: Effort normal and breath sounds normal  No respiratory distress  She has no wheezes  Neurological: She is alert and oriented to person, place, and time  Psychiatric: She has a normal mood and affect

## 2019-06-26 ENCOUNTER — TRANSCRIBE ORDERS (OUTPATIENT)
Dept: ADMINISTRATIVE | Facility: HOSPITAL | Age: 74
End: 2019-06-26

## 2019-06-26 ENCOUNTER — APPOINTMENT (OUTPATIENT)
Dept: PREADMISSION TESTING | Facility: HOSPITAL | Age: 74
DRG: 743 | End: 2019-06-26
Payer: MEDICARE

## 2019-06-26 ENCOUNTER — APPOINTMENT (OUTPATIENT)
Dept: LAB | Facility: HOSPITAL | Age: 74
DRG: 743 | End: 2019-06-26
Attending: OBSTETRICS & GYNECOLOGY
Payer: MEDICARE

## 2019-06-26 DIAGNOSIS — Z01.818 PRE-OP TESTING: Primary | ICD-10-CM

## 2019-06-26 DIAGNOSIS — N95.0 POST-MENOPAUSAL BLEEDING: ICD-10-CM

## 2019-06-26 DIAGNOSIS — N85.02 COMPLEX ATYPICAL ENDOMETRIAL HYPERPLASIA: ICD-10-CM

## 2019-06-26 LAB
ABO GROUP BLD: NORMAL
ALBUMIN SERPL BCP-MCNC: 3.2 G/DL (ref 3.5–5)
ALP SERPL-CCNC: 213 U/L (ref 46–116)
ALT SERPL W P-5'-P-CCNC: 17 U/L (ref 12–78)
ANION GAP SERPL CALCULATED.3IONS-SCNC: 6 MMOL/L (ref 4–13)
AST SERPL W P-5'-P-CCNC: 15 U/L (ref 5–45)
BASOPHILS # BLD AUTO: 0.06 THOUSANDS/ΜL (ref 0–0.1)
BASOPHILS NFR BLD AUTO: 1 % (ref 0–1)
BILIRUB SERPL-MCNC: 0.6 MG/DL (ref 0.2–1)
BLD GP AB SCN SERPL QL: NEGATIVE
BUN SERPL-MCNC: 11 MG/DL (ref 5–25)
CALCIUM SERPL-MCNC: 8.4 MG/DL (ref 8.3–10.1)
CHLORIDE SERPL-SCNC: 104 MMOL/L (ref 100–108)
CO2 SERPL-SCNC: 31 MMOL/L (ref 21–32)
CREAT SERPL-MCNC: 0.66 MG/DL (ref 0.6–1.3)
EOSINOPHIL # BLD AUTO: 0.26 THOUSAND/ΜL (ref 0–0.61)
EOSINOPHIL NFR BLD AUTO: 4 % (ref 0–6)
ERYTHROCYTE [DISTWIDTH] IN BLOOD BY AUTOMATED COUNT: 17.7 % (ref 11.6–15.1)
EST. AVERAGE GLUCOSE BLD GHB EST-MCNC: 120 MG/DL
GFR SERPL CREATININE-BSD FRML MDRD: 89 ML/MIN/1.73SQ M
GLUCOSE P FAST SERPL-MCNC: 94 MG/DL (ref 65–99)
HBA1C MFR BLD: 5.8 % (ref 4.2–6.3)
HCT VFR BLD AUTO: 32.2 % (ref 34.8–46.1)
HGB BLD-MCNC: 9.2 G/DL (ref 11.5–15.4)
IMM GRANULOCYTES # BLD AUTO: 0.03 THOUSAND/UL (ref 0–0.2)
IMM GRANULOCYTES NFR BLD AUTO: 0 % (ref 0–2)
LYMPHOCYTES # BLD AUTO: 1.12 THOUSANDS/ΜL (ref 0.6–4.47)
LYMPHOCYTES NFR BLD AUTO: 15 % (ref 14–44)
MCH RBC QN AUTO: 22 PG (ref 26.8–34.3)
MCHC RBC AUTO-ENTMCNC: 28.6 G/DL (ref 31.4–37.4)
MCV RBC AUTO: 77 FL (ref 82–98)
MONOCYTES # BLD AUTO: 0.5 THOUSAND/ΜL (ref 0.17–1.22)
MONOCYTES NFR BLD AUTO: 7 % (ref 4–12)
NEUTROPHILS # BLD AUTO: 5.33 THOUSANDS/ΜL (ref 1.85–7.62)
NEUTS SEG NFR BLD AUTO: 73 % (ref 43–75)
NRBC BLD AUTO-RTO: 0 /100 WBCS
PLATELET # BLD AUTO: 397 THOUSANDS/UL (ref 149–390)
PMV BLD AUTO: 10 FL (ref 8.9–12.7)
POTASSIUM SERPL-SCNC: 3.8 MMOL/L (ref 3.5–5.3)
PROT SERPL-MCNC: 6.9 G/DL (ref 6.4–8.2)
RBC # BLD AUTO: 4.18 MILLION/UL (ref 3.81–5.12)
RH BLD: POSITIVE
SODIUM SERPL-SCNC: 141 MMOL/L (ref 136–145)
SPECIMEN EXPIRATION DATE: NORMAL
T4 FREE SERPL-MCNC: 1.08 NG/DL (ref 0.76–1.46)
TSH SERPL DL<=0.05 MIU/L-ACNC: 2.04 UIU/ML (ref 0.36–3.74)
WBC # BLD AUTO: 7.3 THOUSAND/UL (ref 4.31–10.16)

## 2019-06-26 PROCEDURE — 83036 HEMOGLOBIN GLYCOSYLATED A1C: CPT | Performed by: OBSTETRICS & GYNECOLOGY

## 2019-06-26 PROCEDURE — 86850 RBC ANTIBODY SCREEN: CPT

## 2019-06-26 PROCEDURE — 84439 ASSAY OF FREE THYROXINE: CPT

## 2019-06-26 PROCEDURE — 84443 ASSAY THYROID STIM HORMONE: CPT

## 2019-06-26 PROCEDURE — 86901 BLOOD TYPING SEROLOGIC RH(D): CPT

## 2019-06-26 PROCEDURE — 36415 COLL VENOUS BLD VENIPUNCTURE: CPT | Performed by: OBSTETRICS & GYNECOLOGY

## 2019-06-26 PROCEDURE — 85025 COMPLETE CBC W/AUTO DIFF WBC: CPT

## 2019-06-26 PROCEDURE — 86900 BLOOD TYPING SEROLOGIC ABO: CPT

## 2019-06-26 PROCEDURE — 80053 COMPREHEN METABOLIC PANEL: CPT

## 2019-06-26 NOTE — PRE-PROCEDURE INSTRUCTIONS
My Surgical Experience    The following information was developed to assist you to prepare for your operation  What do I need to do before coming to the hospital?   Arrange for a responsible person to drive you to and from the hospital    Arrange care for your children at home  Children are not allowed in the recovery areas of the hospital   Plan to wear clothing that is easy to put on and take off  If you are having shoulder surgery, wear a shirt that buttons or zippers in the front  Bathing  o Shower the evening before and the morning of your surgery with an antibacterial soap  Please refer to the Pre Op Showering Instructions for Surgery Patients Sheet   o Remove nail polish and all body piercing jewelry  o Do not shave any body part for at least 24 hours before surgery-this includes face, arms, legs and upper body  Food  o Nothing to eat or drink after midnight the night before your surgery  This includes candy and chewing gum  o Exception: If your surgery is after 12:00pm (noon), you may have clear liquids such as 7-Up®, ginger ale, apple or cranberry juice, Jell-O®, water, or clear broth until 8:00 am  o Do not drink milk or juice with pulp on the morning before surgery  o Do not drink alcohol 24 hours before surgery  Medicine  o Follow instructions you received from your surgeon about which medicines you may take on the day of surgery  o If instructed to take medicine on the morning of surgery, take pills with just a small sip of water  Call your prescribing doctor for specific infroamtion on what to do if you take insulin    What should I bring to the hospital?    Bring:  Yisel Haskins or a walker, if you have them, for foot or knee surgery   A list of the daily medicines, vitamins, minerals, herbals and nutritional supplements you take   Include the dosages of medicines and the time you take them each day   Glasses, dentures or hearing aids   Minimal clothing; you will be wearing hospital sleepwear   Photo ID; required to verify your identity   If you have a Living Will or Power of , bring a copy of the documents   If you have an ostomy, bring an extra pouch and any supplies you use    Do not bring   Medicines or inhalers   Money, valuables or jewelry    What other information should I know about the day of surgery?  Notify your surgeons if you develop a cold, sore throat, cough, fever, rash or any other illness   Report to the Ambulatory Surgical/Same Day Surgery Unit   You will be instructed to stop at Registration only if you have not been pre-registered   Inform your  fi they do not stay that they will be asked by the staff to leave a phone number where they can be reached   Be available to be reached before surgery  In the event the operating room schedule changes, you may be asked to come in earlier or later than expected    *It is important to tell your doctor and others involved in your health care if you are taking or have been taking any non-prescription drugs, vitamins, minerals, herbals or other nutritional supplements  Any of these may interact with some food or medicines and cause a reaction      Pre-Surgery Instructions:   Medication Instructions    amLODIPine (NORVASC) 2 5 mg tablet Instructed patient per Anesthesia Guidelines   aspirin 81 MG tablet Instructed patient per Anesthesia Guidelines   atenolol (TENORMIN) 25 mg tablet Instructed patient per Anesthesia Guidelines   atorvastatin (LIPITOR) 10 mg tablet Instructed patient per Anesthesia Guidelines   Calcium-Vitamin D (CALTRATE 600 PLUS-VIT D PO) Instructed patient per Anesthesia Guidelines   Fexofenadine HCl (ALLEGRA PO) Instructed patient per Anesthesia Guidelines   pantoprazole (PROTONIX) 40 mg tablet Instructed patient per Anesthesia Guidelines   PARoxetine (PAXIL) 20 mg tablet Instructed patient per Anesthesia Guidelines      VENTOLIN  (90 Base) MCG/ACT inhaler Instructed patient per Anesthesia Guidelines   zolpidem (AMBIEN) 5 mg tablet Instructed patient per Anesthesia Guidelines  To take protonix a m  Of surgery

## 2019-07-01 DIAGNOSIS — I10 BENIGN ESSENTIAL HYPERTENSION: ICD-10-CM

## 2019-07-01 RX ORDER — AMLODIPINE BESYLATE 2.5 MG/1
2.5 TABLET ORAL DAILY
Qty: 90 TABLET | Refills: 3 | Status: SHIPPED | OUTPATIENT
Start: 2019-07-01 | End: 2020-07-06 | Stop reason: SDUPTHER

## 2019-07-10 ENCOUNTER — ANESTHESIA (OUTPATIENT)
Dept: PERIOP | Facility: HOSPITAL | Age: 74
DRG: 743 | End: 2019-07-10
Payer: MEDICARE

## 2019-07-10 ENCOUNTER — ANESTHESIA EVENT (OUTPATIENT)
Dept: PERIOP | Facility: HOSPITAL | Age: 74
DRG: 743 | End: 2019-07-10
Payer: MEDICARE

## 2019-07-10 ENCOUNTER — HOSPITAL ENCOUNTER (INPATIENT)
Facility: HOSPITAL | Age: 74
LOS: 3 days | Discharge: HOME | DRG: 743 | End: 2019-07-13
Attending: OBSTETRICS & GYNECOLOGY | Admitting: OBSTETRICS & GYNECOLOGY
Payer: MEDICARE

## 2019-07-10 ENCOUNTER — TELEPHONE (OUTPATIENT)
Dept: OBGYN CLINIC | Facility: CLINIC | Age: 74
End: 2019-07-10

## 2019-07-10 DIAGNOSIS — N95.0 POST-MENOPAUSAL BLEEDING: ICD-10-CM

## 2019-07-10 DIAGNOSIS — N85.02 COMPLEX ATYPICAL ENDOMETRIAL HYPERPLASIA: ICD-10-CM

## 2019-07-10 LAB
ERYTHROCYTE [DISTWIDTH] IN BLOOD BY AUTOMATED COUNT: 17.8 % (ref 11.6–15.1)
GLUCOSE SERPL-MCNC: 87 MG/DL (ref 65–140)
HCT VFR BLD AUTO: 31.1 % (ref 34.8–46.1)
HGB BLD-MCNC: 9.1 G/DL (ref 11.5–15.4)
MCH RBC QN AUTO: 22 PG (ref 26.8–34.3)
MCHC RBC AUTO-ENTMCNC: 29.3 G/DL (ref 31.4–37.4)
MCV RBC AUTO: 75 FL (ref 82–98)
PLATELET # BLD AUTO: 393 THOUSANDS/UL (ref 149–390)
PMV BLD AUTO: 9.5 FL (ref 8.9–12.7)
RBC # BLD AUTO: 4.13 MILLION/UL (ref 3.81–5.12)
WBC # BLD AUTO: 7.98 THOUSAND/UL (ref 4.31–10.16)

## 2019-07-10 PROCEDURE — 88309 TISSUE EXAM BY PATHOLOGIST: CPT | Performed by: PATHOLOGY

## 2019-07-10 PROCEDURE — 0UT20ZZ RESECTION OF BILATERAL OVARIES, OPEN APPROACH: ICD-10-PCS | Performed by: OBSTETRICS & GYNECOLOGY

## 2019-07-10 PROCEDURE — 0UT90ZZ RESECTION OF UTERUS, OPEN APPROACH: ICD-10-PCS | Performed by: OBSTETRICS & GYNECOLOGY

## 2019-07-10 PROCEDURE — 87081 CULTURE SCREEN ONLY: CPT | Performed by: SURGERY

## 2019-07-10 PROCEDURE — 58150 TOTAL HYSTERECTOMY: CPT | Performed by: PHYSICIAN ASSISTANT

## 2019-07-10 PROCEDURE — 88342 IMHCHEM/IMCYTCHM 1ST ANTB: CPT | Performed by: PATHOLOGY

## 2019-07-10 PROCEDURE — 0WJJ4ZZ INSPECTION OF PELVIC CAVITY, PERCUTANEOUS ENDOSCOPIC APPROACH: ICD-10-PCS | Performed by: OBSTETRICS & GYNECOLOGY

## 2019-07-10 PROCEDURE — 88331 PATH CONSLTJ SURG 1 BLK 1SPC: CPT | Performed by: PATHOLOGY

## 2019-07-10 PROCEDURE — 0UT70ZZ RESECTION OF BILATERAL FALLOPIAN TUBES, OPEN APPROACH: ICD-10-PCS | Performed by: OBSTETRICS & GYNECOLOGY

## 2019-07-10 PROCEDURE — 88305 TISSUE EXAM BY PATHOLOGIST: CPT | Performed by: PATHOLOGY

## 2019-07-10 PROCEDURE — C9290 INJ, BUPIVACAINE LIPOSOME: HCPCS | Performed by: ANESTHESIOLOGY

## 2019-07-10 PROCEDURE — 0UJD4ZZ INSPECTION OF UTERUS AND CERVIX, PERCUTANEOUS ENDOSCOPIC APPROACH: ICD-10-PCS | Performed by: OBSTETRICS & GYNECOLOGY

## 2019-07-10 PROCEDURE — 88341 IMHCHEM/IMCYTCHM EA ADD ANTB: CPT | Performed by: PATHOLOGY

## 2019-07-10 PROCEDURE — 82948 REAGENT STRIP/BLOOD GLUCOSE: CPT

## 2019-07-10 PROCEDURE — 44005 FREEING OF BOWEL ADHESION: CPT | Performed by: SURGERY

## 2019-07-10 PROCEDURE — 85027 COMPLETE CBC AUTOMATED: CPT | Performed by: OBSTETRICS & GYNECOLOGY

## 2019-07-10 PROCEDURE — 58150 TOTAL HYSTERECTOMY: CPT | Performed by: OBSTETRICS & GYNECOLOGY

## 2019-07-10 PROCEDURE — 0DNW0ZZ RELEASE PERITONEUM, OPEN APPROACH: ICD-10-PCS | Performed by: SURGERY

## 2019-07-10 RX ORDER — HYDROMORPHONE HCL/PF 1 MG/ML
SYRINGE (ML) INJECTION AS NEEDED
Status: DISCONTINUED | OUTPATIENT
Start: 2019-07-10 | End: 2019-07-10 | Stop reason: SURG

## 2019-07-10 RX ORDER — ONDANSETRON 2 MG/ML
INJECTION INTRAMUSCULAR; INTRAVENOUS AS NEEDED
Status: DISCONTINUED | OUTPATIENT
Start: 2019-07-10 | End: 2019-07-10 | Stop reason: SURG

## 2019-07-10 RX ORDER — FENTANYL CITRATE/PF 50 MCG/ML
25 SYRINGE (ML) INJECTION
Status: DISCONTINUED | OUTPATIENT
Start: 2019-07-10 | End: 2019-07-10 | Stop reason: HOSPADM

## 2019-07-10 RX ORDER — KETOROLAC TROMETHAMINE 30 MG/ML
15 INJECTION, SOLUTION INTRAMUSCULAR; INTRAVENOUS ONCE
Status: COMPLETED | OUTPATIENT
Start: 2019-07-10 | End: 2019-07-10

## 2019-07-10 RX ORDER — CEFAZOLIN SODIUM 2 G/50ML
2000 SOLUTION INTRAVENOUS ONCE
Status: COMPLETED | OUTPATIENT
Start: 2019-07-10 | End: 2019-07-10

## 2019-07-10 RX ORDER — ONDANSETRON 2 MG/ML
4 INJECTION INTRAMUSCULAR; INTRAVENOUS EVERY 6 HOURS PRN
Status: DISCONTINUED | OUTPATIENT
Start: 2019-07-10 | End: 2019-07-12

## 2019-07-10 RX ORDER — GABAPENTIN 100 MG/1
100 CAPSULE ORAL ONCE
Status: COMPLETED | OUTPATIENT
Start: 2019-07-10 | End: 2019-07-10

## 2019-07-10 RX ORDER — ACETAMINOPHEN 325 MG/1
975 TABLET ORAL ONCE
Status: COMPLETED | OUTPATIENT
Start: 2019-07-10 | End: 2019-07-10

## 2019-07-10 RX ORDER — SODIUM CHLORIDE, SODIUM LACTATE, POTASSIUM CHLORIDE, CALCIUM CHLORIDE 600; 310; 30; 20 MG/100ML; MG/100ML; MG/100ML; MG/100ML
100 INJECTION, SOLUTION INTRAVENOUS CONTINUOUS
Status: DISCONTINUED | OUTPATIENT
Start: 2019-07-10 | End: 2019-07-11

## 2019-07-10 RX ORDER — HYDROMORPHONE HCL/PF 1 MG/ML
0.5 SYRINGE (ML) INJECTION
Status: DISCONTINUED | OUTPATIENT
Start: 2019-07-10 | End: 2019-07-13 | Stop reason: HOSPADM

## 2019-07-10 RX ORDER — MIDAZOLAM HYDROCHLORIDE 1 MG/ML
INJECTION INTRAMUSCULAR; INTRAVENOUS AS NEEDED
Status: DISCONTINUED | OUTPATIENT
Start: 2019-07-10 | End: 2019-07-10 | Stop reason: SURG

## 2019-07-10 RX ORDER — DEXAMETHASONE SODIUM PHOSPHATE 4 MG/ML
INJECTION, SOLUTION INTRA-ARTICULAR; INTRALESIONAL; INTRAMUSCULAR; INTRAVENOUS; SOFT TISSUE AS NEEDED
Status: DISCONTINUED | OUTPATIENT
Start: 2019-07-10 | End: 2019-07-10 | Stop reason: SURG

## 2019-07-10 RX ORDER — LIDOCAINE HYDROCHLORIDE 10 MG/ML
INJECTION, SOLUTION INFILTRATION; PERINEURAL AS NEEDED
Status: DISCONTINUED | OUTPATIENT
Start: 2019-07-10 | End: 2019-07-10 | Stop reason: SURG

## 2019-07-10 RX ORDER — OXYCODONE HYDROCHLORIDE 5 MG/1
5 TABLET ORAL EVERY 4 HOURS PRN
Status: DISCONTINUED | OUTPATIENT
Start: 2019-07-10 | End: 2019-07-13 | Stop reason: HOSPADM

## 2019-07-10 RX ORDER — MAGNESIUM HYDROXIDE 1200 MG/15ML
LIQUID ORAL AS NEEDED
Status: DISCONTINUED | OUTPATIENT
Start: 2019-07-10 | End: 2019-07-10 | Stop reason: HOSPADM

## 2019-07-10 RX ORDER — ROCURONIUM BROMIDE 10 MG/ML
INJECTION, SOLUTION INTRAVENOUS AS NEEDED
Status: DISCONTINUED | OUTPATIENT
Start: 2019-07-10 | End: 2019-07-10 | Stop reason: SURG

## 2019-07-10 RX ORDER — IBUPROFEN 400 MG/1
400 TABLET ORAL EVERY 6 HOURS PRN
Status: DISCONTINUED | OUTPATIENT
Start: 2019-07-10 | End: 2019-07-13 | Stop reason: HOSPADM

## 2019-07-10 RX ORDER — NEOSTIGMINE METHYLSULFATE 1 MG/ML
INJECTION INTRAVENOUS AS NEEDED
Status: DISCONTINUED | OUTPATIENT
Start: 2019-07-10 | End: 2019-07-10 | Stop reason: SURG

## 2019-07-10 RX ORDER — OXYCODONE HYDROCHLORIDE AND ACETAMINOPHEN 5; 325 MG/1; MG/1
1 TABLET ORAL ONCE AS NEEDED
Status: DISCONTINUED | OUTPATIENT
Start: 2019-07-10 | End: 2019-07-12

## 2019-07-10 RX ORDER — PAROXETINE HYDROCHLORIDE 20 MG/1
20 TABLET, FILM COATED ORAL DAILY
Status: DISCONTINUED | OUTPATIENT
Start: 2019-07-11 | End: 2019-07-10

## 2019-07-10 RX ORDER — ACETAMINOPHEN 325 MG/1
975 TABLET ORAL EVERY 6 HOURS SCHEDULED
Status: DISCONTINUED | OUTPATIENT
Start: 2019-07-10 | End: 2019-07-12

## 2019-07-10 RX ORDER — GLYCOPYRROLATE 0.2 MG/ML
INJECTION INTRAMUSCULAR; INTRAVENOUS AS NEEDED
Status: DISCONTINUED | OUTPATIENT
Start: 2019-07-10 | End: 2019-07-10 | Stop reason: SURG

## 2019-07-10 RX ORDER — PROPOFOL 10 MG/ML
INJECTION, EMULSION INTRAVENOUS AS NEEDED
Status: DISCONTINUED | OUTPATIENT
Start: 2019-07-10 | End: 2019-07-10 | Stop reason: SURG

## 2019-07-10 RX ORDER — ONDANSETRON 2 MG/ML
4 INJECTION INTRAMUSCULAR; INTRAVENOUS ONCE AS NEEDED
Status: DISCONTINUED | OUTPATIENT
Start: 2019-07-10 | End: 2019-07-10 | Stop reason: HOSPADM

## 2019-07-10 RX ORDER — PAROXETINE HYDROCHLORIDE 20 MG/1
20 TABLET, FILM COATED ORAL DAILY
Status: DISCONTINUED | OUTPATIENT
Start: 2019-07-10 | End: 2019-07-13 | Stop reason: HOSPADM

## 2019-07-10 RX ORDER — SODIUM CHLORIDE 9 MG/ML
INJECTION, SOLUTION INTRAVENOUS AS NEEDED
Status: DISCONTINUED | OUTPATIENT
Start: 2019-07-10 | End: 2019-07-10 | Stop reason: HOSPADM

## 2019-07-10 RX ORDER — FENTANYL CITRATE 50 UG/ML
INJECTION, SOLUTION INTRAMUSCULAR; INTRAVENOUS AS NEEDED
Status: DISCONTINUED | OUTPATIENT
Start: 2019-07-10 | End: 2019-07-10 | Stop reason: SURG

## 2019-07-10 RX ORDER — SODIUM CHLORIDE, SODIUM LACTATE, POTASSIUM CHLORIDE, CALCIUM CHLORIDE 600; 310; 30; 20 MG/100ML; MG/100ML; MG/100ML; MG/100ML
75 INJECTION, SOLUTION INTRAVENOUS CONTINUOUS
Status: DISCONTINUED | OUTPATIENT
Start: 2019-07-10 | End: 2019-07-11

## 2019-07-10 RX ORDER — DIPHENHYDRAMINE HYDROCHLORIDE 50 MG/ML
12.5 INJECTION INTRAMUSCULAR; INTRAVENOUS ONCE AS NEEDED
Status: DISCONTINUED | OUTPATIENT
Start: 2019-07-10 | End: 2019-07-10 | Stop reason: HOSPADM

## 2019-07-10 RX ADMIN — FENTANYL CITRATE 50 MCG: 50 INJECTION, SOLUTION INTRAMUSCULAR; INTRAVENOUS at 13:11

## 2019-07-10 RX ADMIN — NEOSTIGMINE METHYLSULFATE 3 MG: 1 INJECTION INTRAVENOUS at 16:24

## 2019-07-10 RX ADMIN — SODIUM CHLORIDE, SODIUM LACTATE, POTASSIUM CHLORIDE, AND CALCIUM CHLORIDE: .6; .31; .03; .02 INJECTION, SOLUTION INTRAVENOUS at 13:15

## 2019-07-10 RX ADMIN — FENTANYL CITRATE 50 MCG: 50 INJECTION, SOLUTION INTRAMUSCULAR; INTRAVENOUS at 13:16

## 2019-07-10 RX ADMIN — HYDROMORPHONE HYDROCHLORIDE 2 MG: 1 INJECTION, SOLUTION INTRAMUSCULAR; INTRAVENOUS; SUBCUTANEOUS at 13:48

## 2019-07-10 RX ADMIN — LIDOCAINE HYDROCHLORIDE 50 MG: 10 INJECTION, SOLUTION INFILTRATION; PERINEURAL at 13:11

## 2019-07-10 RX ADMIN — SODIUM CHLORIDE, SODIUM LACTATE, POTASSIUM CHLORIDE, AND CALCIUM CHLORIDE: .6; .31; .03; .02 INJECTION, SOLUTION INTRAVENOUS at 14:13

## 2019-07-10 RX ADMIN — GLYCOPYRROLATE 0.4 MG: 0.2 INJECTION, SOLUTION INTRAMUSCULAR; INTRAVENOUS at 16:24

## 2019-07-10 RX ADMIN — ACETAMINOPHEN 975 MG: 325 TABLET, FILM COATED ORAL at 12:05

## 2019-07-10 RX ADMIN — SODIUM CHLORIDE, SODIUM LACTATE, POTASSIUM CHLORIDE, AND CALCIUM CHLORIDE: .6; .31; .03; .02 INJECTION, SOLUTION INTRAVENOUS at 14:56

## 2019-07-10 RX ADMIN — GABAPENTIN 100 MG: 100 CAPSULE ORAL at 12:05

## 2019-07-10 RX ADMIN — MIDAZOLAM HYDROCHLORIDE 2 MG: 1 INJECTION, SOLUTION INTRAMUSCULAR; INTRAVENOUS at 13:10

## 2019-07-10 RX ADMIN — DEXAMETHASONE SODIUM PHOSPHATE 4 MG: 4 INJECTION, SOLUTION INTRA-ARTICULAR; INTRALESIONAL; INTRAMUSCULAR; INTRAVENOUS; SOFT TISSUE at 13:39

## 2019-07-10 RX ADMIN — PROPOFOL 150 MG: 10 INJECTION, EMULSION INTRAVENOUS at 13:11

## 2019-07-10 RX ADMIN — PAROXETINE 20 MG: 20 TABLET, FILM COATED ORAL at 23:28

## 2019-07-10 RX ADMIN — ONDANSETRON 4 MG: 2 INJECTION INTRAMUSCULAR; INTRAVENOUS at 16:06

## 2019-07-10 RX ADMIN — ROCURONIUM BROMIDE 50 MG: 10 INJECTION, SOLUTION INTRAVENOUS at 13:12

## 2019-07-10 RX ADMIN — ROCURONIUM BROMIDE 20 MG: 10 INJECTION, SOLUTION INTRAVENOUS at 14:28

## 2019-07-10 RX ADMIN — KETOROLAC TROMETHAMINE 15 MG: 30 INJECTION, SOLUTION INTRAMUSCULAR at 17:37

## 2019-07-10 RX ADMIN — CEFAZOLIN SODIUM 2000 MG: 2 SOLUTION INTRAVENOUS at 13:08

## 2019-07-10 RX ADMIN — SODIUM CHLORIDE, SODIUM LACTATE, POTASSIUM CHLORIDE, AND CALCIUM CHLORIDE: .6; .31; .03; .02 INJECTION, SOLUTION INTRAVENOUS at 13:08

## 2019-07-10 RX ADMIN — PROPOFOL 50 MG: 10 INJECTION, EMULSION INTRAVENOUS at 13:16

## 2019-07-10 RX ADMIN — SODIUM CHLORIDE, POTASSIUM CHLORIDE, SODIUM LACTATE AND CALCIUM CHLORIDE 100 ML/HR: 600; 310; 30; 20 INJECTION, SOLUTION INTRAVENOUS at 20:14

## 2019-07-10 RX ADMIN — ACETAMINOPHEN 975 MG: 325 TABLET, FILM COATED ORAL at 20:12

## 2019-07-10 NOTE — ANESTHESIA PREPROCEDURE EVALUATION
Review of Systems/Medical History  Patient summary reviewed  Chart reviewed      Cardiovascular  EKG reviewed, Exercise tolerance (METS): >4,  Hyperlipidemia, Hypertension , CAD ,   Comment: LEFT VENTRICLE:  Systolic function was normal  Ejection fraction was estimated in the range of 60 % to 65 % to be 65 %  There were no regional wall motion abnormalities  Wall thickness was mildly increased  Doppler parameters were consistent with abnormal left ventricular relaxation (grade 1 diastolic dysfunction)      LEFT ATRIUM:  The atrium was moderately dilated      RIGHT ATRIUM:  The atrium was mildly dilated      MITRAL VALVE:  There was trace regurgitation      TRICUSPID VALVE:  There was mild regurgitation      PULMONIC VALVE:  There was trace regurgitation  , Pulmonary hypertension ( estimated peak PA pressure was 40 mmHg) Pulmonary  Asthma , well controlled/ stable , Shortness of breath,        GI/Hepatic    GERD , Liver disease (altered LFTs) ,  Hiatal hernia,             Endo/Other     GYN       Hematology  Anemia ,     Musculoskeletal    Arthritis     Neurology   Psychology   Depression ,              Physical Exam    Airway    Mallampati score: II  TM Distance: >3 FB  Neck ROM: full     Dental   No notable dental hx     Cardiovascular  Cardiovascular exam normal    Pulmonary  Pulmonary exam normal     Other Findings        Anesthesia Plan  ASA Score- 3     Anesthesia Type- general with ASA Monitors  Additional Monitors:   Airway Plan: ETT  Plan Factors-    Induction- intravenous  Postoperative Plan-   Planned trial extubation    Informed Consent- Anesthetic plan and risks discussed with patient  I personally reviewed this patient with the CRNA  Discussed and agreed on the Anesthesia Plan with the CRNA  Ovidio Harley

## 2019-07-10 NOTE — OP NOTE
OPERATIVE REPORT  PATIENT NAME: Jaqueline Mckeon    :  1945  MRN: [id number]  Pt Location: [facility] OR ROOM 01    SURGERY DATE: 7/10/2019    Surgeon(s) and Role:  Panel 1:     * Olivia Chávez MD - Primary     * Paty Lindo PA-C - Assisting  Panel 2:     * Lilly Hylton MD - Primary    Preop Diagnosis:  Post-menopausal bleeding [N95 0]  Complex atypical endometrial hyperplasia [N85 02]    Post-Op Diagnosis Codes:     * Post-menopausal bleeding [N95 0]     * Complex atypical endometrial hyperplasia [N85 02]  Bowel adhesions    ProceProcedure(s) (LRB):  Diagnostic laparoscopyProcedure(s) (LRB):  DANNY BSO  Extensive Lysis of bowel adhesions over sew of serosal tear and closure of mesenteric defect      Specimen(s):  ID Type Source Tests Collected by Time Destination   1 : fallopian tube with implants r/o endometrial ca Tissue Fallopian Tube, Right TISSUE EXAM Olivia Chávez MD 7/10/2019 1422    2 : UTERUS, CERVIX, BILATERAL OVARIES, LEFT FALLOPIAN FUBE Tissue Uterus w/Bilateral Ovaries and Fallopian Tubes TISSUE EXAM Olivia Chávez MD 7/10/2019 1458        Estimated Blood Loss:   200 mL    Drains:  Urethral Catheter Latex 16 Fr  (Active)   Site Assessment Clean;Skin intact 7/10/2019  4:45 PM   Collection Container Standard drainage bag 7/10/2019  4:45 PM   Securement Method Securing device (Describe) 7/10/2019  4:45 PM   Output (mL) 100 mL 7/10/2019  5:08 PM   Number of days: 0       Anesthesia Type:   General    Operative Indications:  Post-menopausal bleeding [N95 0]  Complex atypical endometrial hyperplasia [N85 02]      Operative Findings:  [place] under anesthesia revealed the uterus to be small anterior and without adnexal masses  Laparoscopic evaluation revealed extensive bowel adhesions and inability to visualize the pelvic contents with either a infraumbilical port or a upper left quadrant port  Laparotomy findings were extensive adhesions with small bowel  Normal small uterus ovaries and tubes    There were some small excrescences on the fallopian tubes and a portion was sent for frozen section returned as small paratubal cysts benign  The end of the procedure Donaldson catheter was draining clear yellow urine in adequate amounts    Complications:   None    Procedure and Technique:  The patient was taken to the operating room suite and identified by name and wrist band is Olga Mccormick however  She was placed in modified dorsal lithotomy position  The perineal area was prepped with chlorhexidine solution and the abdomen was prepped with ChloraPrep  The patient was then sterilely draped  A time-out was performed with everyone in agreement  A Donaldson catheter was placed into the bladder for constant urinary drainage  A weighted speculum was placed in posterior vaginal vault and the anterior lip of the cervix was grasped with a double tooth tenaculum and kept on traction  A uterine sound was then placed into the uterus and the sound and tenaculum were held together by Steri-Strips for mobilization of the uterus during the procedure  Gloves were changed and attention was then directed to the patient's abdomen  A small infraumbilical incision was made and a 10 mm laparoscoped with trocar was inserted under direct visualization which met with a large amount of bowel adhesions unable to completely appreciate the peritoneal cavity  The laparoscopic trocar removed  An additional site in the right upper quadrant was then chosen and a 5 mm laparoscoped with a trocar was inserted under direct visualization, again met with adhesions unable to visualize the peritoneal contents  Decision was made at that time to proceed with an open procedure  The tenaculum and sound were removed from the cervix  Gloves were changed again and a Pfannenstiel incision was made in the patient's lower abdomen  It was carried down through skin and subcutaneous fat and tissue layer of fascia below  The fascia was then sharply and dissected bilaterally  The fascia was then sharply and bluntly dissected away from the rectus muscles below  The rectus muscles were then divided in the midline and the peritoneum was entered peritoneal incision was hindered by the amount of bowel adhesions below, there were some adhesions that were gently lysed off from the peritoneum in till the lower portion of the peritoneum could be entered which was clear from adhesions  Gently packing away the bowel a tamy self-retaining retractor was then used to help visualize the pelvic contents  Again no adhesions were noted in this area  The right fallopian tube and left fallopian tube did have some very small excrescences and so the right fallopian tube was excised off using a EnSeal device with cautery in cut and sent for frozen section  The EnSeal device was then used to further dissect away the round ligament and the infundibulopelvic ligament and cauterized down on around the level of the uterine vessels  This was all completed on the right side followed by completion on the left side  The anterior leaf of the broad ligament had been incised sharply after ligation of the rounds and the bladder was dropped well away from the specimen  At this point the right uterine vessels were isolated using a straight Kocher and incised and suture ligated away from the body of the uterus  This was done on the left side as well in till the end of the cervix was reached  At this point under the cervix the vaginal cuff was clamped specimen was excised off and the vaginal cuff was closed using interrupted figure-of-eight sutures of 0 Vicryl  Hemostasis was excellent and attention was then directed at the bowel adhesions with intraoperative consult to Dr John Rodriguez  At the conclusion of Dr John Rodriguez was portion of the case a Surgicel was placed into the pelvic area to encompass the cuff and other raw areas  Hemostasis is excellent    The fascia was then closed using 2 running 0 Vicryl sutures each extending from the apex incision and crossing in the midline  Subcutaneous fat tissue was copiously irrigated and skin was closed with staples  The prior laparoscopy incisions had been previously closed with interrupted 4 0 Vicryl sutures  These were cleaned and closed further with hystocryl glue and Steri-Strips    The patient having tolerated procedure well was cleaned a woken and brought to recovery room stable condition   A physician assistant was required during the procedure for retraction tissue handling,dissection and suturing    Patient Disposition:  PACU     SIGNATURE: Wai Camarillo MD  DATE: July 10, 2019  TIME: 5:41 PM  No qualified resident was available for assistance during this case

## 2019-07-10 NOTE — OP NOTE
OPERATIVE REPORT  PATIENT NAME: Chano Shell    :  1945  MRN: [id number]  Pt Location: [facility] ROOM 01    SURGERY DATE: 7/10/2019    Surgeon(s) and Role:  Panel 1:     * Brittni Haywood MD - Primary     * Ariel Schmidt PA-C - Assisting  Panel 2:     * Willi West MD - Primary    Preop Diagnosis:  Post-menopausal bleeding [N95 0]  Complex atypical endometrial hyperplasia [N85 02]    Post-Op Diagnosis Codes:     * Post-menopausal bleeding [N95 0]     * Complex atypical endometrial hyperplasia [N85 02]    Procedure(s) (LRB):  HYSTERECTOMY LAPAROSCOPIC ASSISTED VAGINAL (LAVH) (N/A)  SALPINGO-OOPHORECTOMY, LAPAROSCOPIC (Bilateral)  EXTENSIVE LYSIS ADHESIONS, OVER SEW SEROSAL TEAR, CLOSURE MESENTERIC DEFECT (N/A)    Specimen(s):  ID Type Source Tests Collected by Time Destination   1 : fallopian tube with implants r/o endometrial ca Tissue Fallopian Tube, Right TISSUE EXAM Brittni Haywood MD 7/10/2019 1422    2 : UTERUS, CERVIX, BILATERAL OVARIES, LEFT FALLOPIAN FUBE Tissue Uterus w/Bilateral Ovaries and Fallopian Tubes TISSUE EXAM Brittni Haywood MD 7/10/2019 1458        Estimated Blood Loss:   200 mL    Drains:  Urethral Catheter Latex 16 Fr  (Active)   Site Assessment Clean;Skin intact 7/10/2019  4:45 PM   Collection Container Standard drainage bag 7/10/2019  4:45 PM   Securement Method Securing device (Describe) 7/10/2019  4:45 PM   Output (mL) 100 mL 7/10/2019  5:08 PM   Number of days: 0     Anesthesia Type:   General    Operative Indications:  Post-menopausal bleeding [N95 0]  Complex atypical endometrial hyperplasia [N85 02]    Operative Findings:  Extensive adhesions    Complications:   None    Procedure and Technique:  I presented to the OR at the request of Dr Carlos Sharp for evaluation of the bowel  After she had sutured the vaginal cuff closed, we proceeded to evaluate the area of the trocar entry in the umbilical region  We looked at the rectus sheath and also the peritoneal regions    No bleeding was noted in the area, however there was 1 segment of bowel that was particularly stuck up there  There was extensive adhesions upon further examination and therefore we started with an extensive lysis of adhesions primarily looking at the area of the entry site of the trocar  While running the bowel, we did see 1 area where the mesentery had torn, an area that was bleeding which I over sewed and subsequently closed both sides of the curtain to prevent any internal hernia  The bowel supplied by this mesentery was pink and peristaltic and therefore we elected not to take the segment out  Following this we completed the lysis of adhesions and ran the small bowel all the way from the ligament of Treitz all the way to the terminal ileum  There was 1 site near the mesenteric tear of a small serosal rent which I over sewed with 3 0 silk sutures in an interrupted fashion  I used FloSeal to insure hemostasis and a good sealing of the mesenteric rent and once again looked at the small bowel supplied by the mesentery and noted that the area was pink and peristaltic  Please refer to Dr Jazmín Phillips note for the details of the rest of the surgery  A co-surgeon was required because of skills and techniques relevant to speciality and A physician assistant was required during the procedure for retraction tissue handling, dissection, and suturing      Patient Disposition:  PACU     SIGNATURE: Santos Estrella MD  DATE: July 10, 2019  TIME: 5:09 PM

## 2019-07-10 NOTE — INTERVAL H&P NOTE
H&P reviewed  After examining the patient I find no changes in the patients condition since the H&P had been written    /72   Pulse 72   Temp 99 6 °F (37 6 °C) (Tympanic)   Resp 18   SpO2 97%

## 2019-07-10 NOTE — ANESTHESIA POSTPROCEDURE EVALUATION
Post-Op Assessment Note    CV Status:  Stable       Mental Status:  Alert   Hydration Status:  Stable   PONV Controlled:  Controlled   Airway Patency:  Patent   Post Op Vitals Reviewed: Yes      Staff: CRNA           BP      Temp      Pulse     Resp      SpO2

## 2019-07-11 ENCOUNTER — TELEPHONE (OUTPATIENT)
Dept: OBGYN CLINIC | Facility: CLINIC | Age: 74
End: 2019-07-11

## 2019-07-11 ENCOUNTER — TELEPHONE (OUTPATIENT)
Dept: FAMILY MEDICINE CLINIC | Facility: CLINIC | Age: 74
End: 2019-07-11

## 2019-07-11 PROBLEM — Z90.710 S/P TAH-BSO (TOTAL ABDOMINAL HYSTERECTOMY AND BILATERAL SALPINGO-OOPHORECTOMY): Status: ACTIVE | Noted: 2019-07-11

## 2019-07-11 PROBLEM — Z90.722 S/P TAH-BSO (TOTAL ABDOMINAL HYSTERECTOMY AND BILATERAL SALPINGO-OOPHORECTOMY): Status: ACTIVE | Noted: 2019-07-11

## 2019-07-11 PROBLEM — Z90.79 S/P TAH-BSO (TOTAL ABDOMINAL HYSTERECTOMY AND BILATERAL SALPINGO-OOPHORECTOMY): Status: ACTIVE | Noted: 2019-07-11

## 2019-07-11 LAB
ALBUMIN SERPL BCP-MCNC: 2.6 G/DL (ref 3.5–5)
ALP SERPL-CCNC: 186 U/L (ref 46–116)
ALT SERPL W P-5'-P-CCNC: 52 U/L (ref 12–78)
ANION GAP SERPL CALCULATED.3IONS-SCNC: 8 MMOL/L (ref 4–13)
AST SERPL W P-5'-P-CCNC: 49 U/L (ref 5–45)
BILIRUB SERPL-MCNC: 0.6 MG/DL (ref 0.2–1)
BUN SERPL-MCNC: 10 MG/DL (ref 5–25)
CALCIUM SERPL-MCNC: 8.1 MG/DL (ref 8.3–10.1)
CHLORIDE SERPL-SCNC: 106 MMOL/L (ref 100–108)
CO2 SERPL-SCNC: 27 MMOL/L (ref 21–32)
CREAT SERPL-MCNC: 0.69 MG/DL (ref 0.6–1.3)
ERYTHROCYTE [DISTWIDTH] IN BLOOD BY AUTOMATED COUNT: 17.9 % (ref 11.6–15.1)
GFR SERPL CREATININE-BSD FRML MDRD: 87 ML/MIN/1.73SQ M
GLUCOSE SERPL-MCNC: 138 MG/DL (ref 65–140)
HCT VFR BLD AUTO: 29 % (ref 34.8–46.1)
HGB BLD-MCNC: 8.4 G/DL (ref 11.5–15.4)
MCH RBC QN AUTO: 21.8 PG (ref 26.8–34.3)
MCHC RBC AUTO-ENTMCNC: 29 G/DL (ref 31.4–37.4)
MCV RBC AUTO: 75 FL (ref 82–98)
PLATELET # BLD AUTO: 412 THOUSANDS/UL (ref 149–390)
PMV BLD AUTO: 9.6 FL (ref 8.9–12.7)
POTASSIUM SERPL-SCNC: 4 MMOL/L (ref 3.5–5.3)
PROT SERPL-MCNC: 6 G/DL (ref 6.4–8.2)
RBC # BLD AUTO: 3.85 MILLION/UL (ref 3.81–5.12)
SODIUM SERPL-SCNC: 141 MMOL/L (ref 136–145)
WBC # BLD AUTO: 17.17 THOUSAND/UL (ref 4.31–10.16)

## 2019-07-11 PROCEDURE — 99024 POSTOP FOLLOW-UP VISIT: CPT | Performed by: SURGERY

## 2019-07-11 PROCEDURE — 85027 COMPLETE CBC AUTOMATED: CPT | Performed by: OBSTETRICS & GYNECOLOGY

## 2019-07-11 PROCEDURE — 99024 POSTOP FOLLOW-UP VISIT: CPT | Performed by: OBSTETRICS & GYNECOLOGY

## 2019-07-11 PROCEDURE — 80053 COMPREHEN METABOLIC PANEL: CPT | Performed by: OBSTETRICS & GYNECOLOGY

## 2019-07-11 RX ORDER — ATENOLOL 25 MG/1
25 TABLET ORAL
Status: DISCONTINUED | OUTPATIENT
Start: 2019-07-11 | End: 2019-07-11

## 2019-07-11 RX ORDER — AMLODIPINE BESYLATE 2.5 MG/1
2.5 TABLET ORAL DAILY
Status: DISCONTINUED | OUTPATIENT
Start: 2019-07-11 | End: 2019-07-11

## 2019-07-11 RX ORDER — ATENOLOL 25 MG/1
25 TABLET ORAL
Status: DISCONTINUED | OUTPATIENT
Start: 2019-07-12 | End: 2019-07-13 | Stop reason: HOSPADM

## 2019-07-11 RX ORDER — ZOLPIDEM TARTRATE 5 MG/1
5 TABLET ORAL
Status: DISCONTINUED | OUTPATIENT
Start: 2019-07-11 | End: 2019-07-13 | Stop reason: HOSPADM

## 2019-07-11 RX ORDER — ATORVASTATIN CALCIUM 10 MG/1
10 TABLET, FILM COATED ORAL
Status: DISCONTINUED | OUTPATIENT
Start: 2019-07-11 | End: 2019-07-13 | Stop reason: HOSPADM

## 2019-07-11 RX ORDER — AMLODIPINE BESYLATE 2.5 MG/1
2.5 TABLET ORAL ONCE
Status: COMPLETED | OUTPATIENT
Start: 2019-07-11 | End: 2019-07-11

## 2019-07-11 RX ORDER — ATENOLOL 25 MG/1
25 TABLET ORAL ONCE
Status: COMPLETED | OUTPATIENT
Start: 2019-07-11 | End: 2019-07-11

## 2019-07-11 RX ORDER — SODIUM CHLORIDE, SODIUM LACTATE, POTASSIUM CHLORIDE, CALCIUM CHLORIDE 600; 310; 30; 20 MG/100ML; MG/100ML; MG/100ML; MG/100ML
25 INJECTION, SOLUTION INTRAVENOUS CONTINUOUS
Status: DISCONTINUED | OUTPATIENT
Start: 2019-07-11 | End: 2019-07-12

## 2019-07-11 RX ORDER — AMLODIPINE BESYLATE 2.5 MG/1
2.5 TABLET ORAL DAILY
Status: DISCONTINUED | OUTPATIENT
Start: 2019-07-12 | End: 2019-07-13 | Stop reason: HOSPADM

## 2019-07-11 RX ORDER — PANTOPRAZOLE SODIUM 40 MG/1
40 TABLET, DELAYED RELEASE ORAL
Status: DISCONTINUED | OUTPATIENT
Start: 2019-07-11 | End: 2019-07-13 | Stop reason: HOSPADM

## 2019-07-11 RX ADMIN — ACETAMINOPHEN 975 MG: 325 TABLET, FILM COATED ORAL at 17:35

## 2019-07-11 RX ADMIN — AMLODIPINE BESYLATE 2.5 MG: 2.5 TABLET ORAL at 18:07

## 2019-07-11 RX ADMIN — ATENOLOL 25 MG: 25 TABLET ORAL at 18:07

## 2019-07-11 RX ADMIN — PANTOPRAZOLE SODIUM 40 MG: 40 TABLET, DELAYED RELEASE ORAL at 09:23

## 2019-07-11 RX ADMIN — ZOLPIDEM TARTRATE 5 MG: 5 TABLET, COATED ORAL at 22:50

## 2019-07-11 RX ADMIN — SODIUM CHLORIDE, POTASSIUM CHLORIDE, SODIUM LACTATE AND CALCIUM CHLORIDE 100 ML/HR: 600; 310; 30; 20 INJECTION, SOLUTION INTRAVENOUS at 06:47

## 2019-07-11 RX ADMIN — PAROXETINE 20 MG: 20 TABLET, FILM COATED ORAL at 22:50

## 2019-07-11 RX ADMIN — ATORVASTATIN CALCIUM 10 MG: 10 TABLET, FILM COATED ORAL at 22:50

## 2019-07-11 RX ADMIN — ENOXAPARIN SODIUM 40 MG: 40 INJECTION SUBCUTANEOUS at 09:26

## 2019-07-11 RX ADMIN — ACETAMINOPHEN 975 MG: 325 TABLET, FILM COATED ORAL at 23:54

## 2019-07-11 RX ADMIN — ACETAMINOPHEN 975 MG: 325 TABLET, FILM COATED ORAL at 06:43

## 2019-07-11 RX ADMIN — ACETAMINOPHEN 975 MG: 325 TABLET, FILM COATED ORAL at 11:14

## 2019-07-11 NOTE — TELEPHONE ENCOUNTER
Patients daughter came into office requesting a note for work as she was accompanying her mom before and after surgery    EDITH NGO

## 2019-07-11 NOTE — PLAN OF CARE
Problem: Potential for Falls  Goal: Patient will remain free of falls  Description  INTERVENTIONS:  - Assess patient frequently for physical needs  -  Identify cognitive and physical deficits and behaviors that affect risk of falls    -  [place] fall precautions as indicated by assessment   - Educate patient/family on patient safety including physical limitations  - Instruct patient to call for assistance with activity based on assessment  - Modify environment to reduce risk of injury  - Consider OT/PT consult to assist with strengthening/mobility  Outcome: Progressing     Problem: PAIN - ADULT  Goal: Verbalizes/displays adequate comfort level or baseline comfort level  Description  Interventions:  - Encourage patient to monitor pain and request assistance  - Assess pain using appropriate pain scale  - Administer analgesics based on type and severity of pain and evaluate response  - Implement non-pharmacological measures as appropriate and evaluate response  - Consider cultural and social influences on pain and pain management  - Notify physician/advanced practitioner if interventions unsuccessful or patient reports new pain  Outcome: Progressing     Problem: INFECTION - ADULT  Goal: Absence or prevention of progression during hospitalization  Description  INTERVENTIONS:  - Assess and monitor for signs and symptoms of infection  - Monitor lab/diagnostic results  - Monitor all insertion sites, i e  indwelling lines, tubes, and drains  - [place] appropriate cooling/warming therapies per order  - Administer medications as ordered  - Instruct and encourage patient and family to use good hand hygiene technique  - Identify and instruct in appropriate isolation precautions for identified infection/condition   Outcome: Progressing  Goal: Absence of fever/infection during neutropenic period  Description  INTERVENTIONS:  - Monitor WBC  - Implement neutropenic guidelines  Outcome: Progressing     Problem: SAFETY ADULT  Goal: Patient will remain free of falls  Description  INTERVENTIONS:  - Assess patient frequently for physical needs  -  Identify cognitive and physical deficits and behaviors that affect risk of falls    -  [place] fall precautions as indicated by assessment   - Educate patient/family on patient safety including physical limitations  - Instruct patient to call for assistance with activity based on assessment  - Modify environment to reduce risk of injury  - Consider OT/PT consult to assist with strengthening/mobility  Outcome: Progressing  Goal: Maintain or return to baseline ADL function  Description  INTERVENTIONS:  -  Assess patient's ability to carry out ADLs; assess patient's baseline for ADL function and identify physical deficits which impact ability to perform ADLs (bathing, care of mouth/teeth, toileting, grooming, dressing, etc )  - Assess/evaluate cause of self-care deficits   - Assess range of motion  - Assess patient's mobility; develop plan if impaired  - Assess patient's need for assistive devices and provide as appropriate  - Encourage maximum independence but intervene and supervise when necessary  ¯ Involve family in performance of ADLs  ¯ Assess for home care needs following discharge   ¯ Request OT consult to assist with ADL evaluation and planning for discharge  ¯ Provide patient education as appropriate  Outcome: Progressing     Problem: DISCHARGE PLANNING  Goal: Discharge to home or other facility with appropriate resources  Description  INTERVENTIONS:  - Identify barriers to discharge w/patient and caregiver  - Arrange for needed discharge resources and transportation as appropriate  - Identify discharge learning needs (meds, wound care, etc )  - Arrange for interpretive services to assist at discharge as needed  - Refer to Case Management Department for coordinating discharge planning if the patient needs post-hospital services based on physician/advanced practitioner order or complex needs related to functional status, cognitive ability, or social support system  Outcome: Progressing

## 2019-07-11 NOTE — PROGRESS NOTES
General Surgery Progress Note    Chief Complaint: "I am doing okay!"    Subjective/24 hour/interim events:  Astrid Leach is doing well today  She states that she feels that she has rumbling in her abdomen  Otherwise has not passed flatus yet  No nausea or vomiting  No fevers, chills, chest pain, shortness of breath  Objective:  /81 (BP Location: Right arm)   Pulse 75   Temp 97 9 °F (36 6 °C) (Oral)   Resp 18   Ht 5' 9" (1 753 m)   Wt 87 8 kg (193 lb 9 oz)   SpO2 95%   BMI 28 58 kg/m²   General:  No acute distress  Abd:  Abdominal binder in place  Active bowel sounds noted  Lower abdominal incision is currently dressed, clean, and dry  Appropriate tenderness to palpation noted  Ext:  SCDs in place    Urinary output of 1 2 cc/kilogram per hour in the last 8 hours of the day  Total urine output of 1150  Estimated blood loss was 200 cc  Total fluid balance is a net positive of 3 24 L  Pertinent labs reviewed  Hemoglobin noted to be 8 4 from 9 1  No signs of overt ongoing hemorrhage noted  Pertinent notes reviewed  Pertinent imaging reviewed  Assessment and Plan  Status post laparoscopic-assisted hysterectomy converted to open given the complexity secondary to adhesions  Extensive lysis of adhesions also  Postoperative day 1  Still awaiting bowel function, but otherwise doing well  Hemoglobin fall of 0 5 g, can easily be explained more than sufficiently by the delusional properties from her positive 3 24 L  We will continue to await bowel function and keep her on a clear liquid diet today  Counseling / Coordination of Care  Total floor/unit time spent today 20 minutes  Greater than 50% of total time was spent with the patient and/or family counseling and/or coordination of care    A description of the counseling / coordination of care:  I performed an interim history, pertinent images and labs, performed a physical examination to arrive at the plan delineated above with associated thought processes  Plan discussed/conveyed with Dr Ciro Garza of primary team     Family member/primary contact updated - daughter at bedside  MARY Pathak  [facility]  Trauma, Acute Care, and [facility] [address]:  [address] [facility], [address] [place] [address]  Office - [phone]  Fax - [phone]    [facility]:  [address]  [place] [address]  Office - [phone]  Fax - [phone]    The areas in bold, if present, are summary statements/bullet points for nursing and consultant/primary teams  Portions of the record may have been created with voice recognition software  Occasional wrong word or "sound a like" substitutions may have occurred due to the inherent limitations of voice recognition software  Read the chart carefully and recognize, using context, where substitutions have occurred

## 2019-07-11 NOTE — PLAN OF CARE
Problem: Potential for Falls  Goal: Patient will remain free of falls  Description  INTERVENTIONS:  - Assess patient frequently for physical needs  -  Identify cognitive and physical deficits and behaviors that affect risk of falls    -  [place] fall precautions as indicated by assessment   - Educate patient/family on patient safety including physical limitations  - Instruct patient to call for assistance with activity based on assessment  - Modify environment to reduce risk of injury  - Consider OT/PT consult to assist with strengthening/mobility  Outcome: Progressing     Problem: PAIN - ADULT  Goal: Verbalizes/displays adequate comfort level or baseline comfort level  Description  Interventions:  - Encourage patient to monitor pain and request assistance  - Assess pain using appropriate pain scale  - Administer analgesics based on type and severity of pain and evaluate response  - Implement non-pharmacological measures as appropriate and evaluate response  - Consider cultural and social influences on pain and pain management  - Notify physician/advanced practitioner if interventions unsuccessful or patient reports new pain  Outcome: Progressing     Problem: INFECTION - ADULT  Goal: Absence or prevention of progression during hospitalization  Description  INTERVENTIONS:  - Assess and monitor for signs and symptoms of infection  - Monitor lab/diagnostic results  - Monitor all insertion sites, i e  indwelling lines, tubes, and drains  - [place] appropriate cooling/warming therapies per order  - Administer medications as ordered  - Instruct and encourage patient and family to use good hand hygiene technique  - Identify and instruct in appropriate isolation precautions for identified infection/condition   Outcome: Progressing  Goal: Absence of fever/infection during neutropenic period  Description  INTERVENTIONS:  - Monitor WBC  - Implement neutropenic guidelines  Outcome: Progressing     Problem: SAFETY ADULT  Goal: Patient will remain free of falls  Description  INTERVENTIONS:  - Assess patient frequently for physical needs  -  Identify cognitive and physical deficits and behaviors that affect risk of falls    -  [place] fall precautions as indicated by assessment   - Educate patient/family on patient safety including physical limitations  - Instruct patient to call for assistance with activity based on assessment  - Modify environment to reduce risk of injury  - Consider OT/PT consult to assist with strengthening/mobility  Outcome: Progressing  Goal: Maintain or return to baseline ADL function  Description  INTERVENTIONS:  -  Assess patient's ability to carry out ADLs; assess patient's baseline for ADL function and identify physical deficits which impact ability to perform ADLs (bathing, care of mouth/teeth, toileting, grooming, dressing, etc )  - Assess/evaluate cause of self-care deficits   - Assess range of motion  - Assess patient's mobility; develop plan if impaired  - Assess patient's need for assistive devices and provide as appropriate  - Encourage maximum independence but intervene and supervise when necessary  ¯ Involve family in performance of ADLs  ¯ Assess for home care needs following discharge   ¯ Request OT consult to assist with ADL evaluation and planning for discharge  ¯ Provide patient education as appropriate  Outcome: Progressing     Problem: DISCHARGE PLANNING  Goal: Discharge to home or other facility with appropriate resources  Description  INTERVENTIONS:  - Identify barriers to discharge w/patient and caregiver  - Arrange for needed discharge resources and transportation as appropriate  - Identify discharge learning needs (meds, wound care, etc )  - Arrange for interpretive services to assist at discharge as needed  - Refer to Case Management Department for coordinating discharge planning if the patient needs post-hospital services based on physician/advanced practitioner order or complex needs related to functional status, cognitive ability, or social support system  Outcome: Progressing

## 2019-07-11 NOTE — PROGRESS NOTES
Progress Note - General Surgery   Annye Colon 69 y o  female MRN: [id number]  Unit/Bed#: [address] [place] [address] Encounter: [id number]    Assessment:  1) POD #1 s/p ex lap, total abdominal hysterectomy, extensive TAMMY, repair of mesenteric defect - hemoglobin relatively stable at 8 4, urinary output appropriate at 0 8 mL/kg per hour, tolerating clear liquids, pain relatively well controlled, not up and ambulating yet    Plan:  1) POD #1 s/p ex lap, extensive TAMMY, repair of mesenteric defect -   - in light of work with small-bowel in currently tolerating clear liquids, can consider dancing to regular diet into the  - IV fluids can be discontinued so long as patient is tolerating clear liquids  - DVT prophylaxis with 40 mg Lovenox subcu once daily  - p r n  Analgesia  - p r n  Zofran  - will plan to discontinue Donaldson catheter tomorrow  - could repeat CBC, BMP, Mag, phos but will default to gynecologic surgery providers  - I/Os  - routine vitals  - likely to be discharged tomorrow    Subjective/Objective   Chief Complaint:  I have been doing all right with these clear liquids    Subjective:  Patient was seen examined at bedside  Patient denies any acute events overnight  Patient denies any nausea or vomiting  Patient has not been up and out of bed and ambulating quite yet  Patient has been tolerating clear liquids without any issues  Patient has not been passing gas yet  Patient does not have any significant abdominal pain currently and is relatively well controlled with her p r n  Analgesic medications  Patient does have some slight tenderness with palpation  Objective:     Blood pressure 149/81, pulse 75, temperature 97 9 °F (36 6 °C), temperature source Oral, resp  rate 18, height 5' 9" (1 753 m), weight 87 8 kg (193 lb 9 oz), SpO2 95 %  ,Body mass index is 28 58 kg/m²        Intake/Output Summary (Last 24 hours) at 7/11/2019 1149  Last data filed at 7/11/2019 0920  Gross per 24 hour   Intake 4830 ml   Output 1350 ml Net 3480 ml       Invasive Devices     Peripheral Intravenous Line            Peripheral IV 07/10/19 Left Hand less than 1 day    Peripheral IV 07/10/19 Right Hand less than 1 day          Drain            Urethral Catheter Latex 16 Fr  less than 1 day                Physical Exam: /81 (BP Location: Right arm)   Pulse 75   Temp 97 9 °F (36 6 °C) (Oral)   Resp 18   Ht 5' 9" (1 753 m)   Wt 87 8 kg (193 lb 9 oz)   SpO2 95%   BMI 28 58 kg/m²   General appearance: alert and oriented, in no acute distress  Head: Normocephalic, without obvious abnormality, atraumatic  Lungs: clear to auscultation bilaterally  Heart: regular rate and rhythm, S1, S2 normal, no murmur, click, rub or gallop  Abdomen: Soft, nondistended, normoactive bowel sounds, tenderness to palpation in all 4 quadrants but most prominent overlying incisions  Skin: Incisions are clean, dry, intact    Lab, Imaging and other studies:  I have personally reviewed pertinent lab results    , CBC:   Lab Results   Component Value Date    WBC 17 17 (H) 07/11/2019    HGB 8 4 (L) 07/11/2019    HCT 29 0 (L) 07/11/2019    MCV 75 (L) 07/11/2019     (H) 07/11/2019    MCH 21 8 (L) 07/11/2019    MCHC 29 0 (L) 07/11/2019    RDW 17 9 (H) 07/11/2019    MPV 9 6 07/11/2019   , CMP:   Lab Results   Component Value Date    SODIUM 141 07/11/2019    K 4 0 07/11/2019     07/11/2019    CO2 27 07/11/2019    BUN 10 07/11/2019    CREATININE 0 69 07/11/2019    CALCIUM 8 1 (L) 07/11/2019    AST 49 (H) 07/11/2019    ALT 52 07/11/2019    ALKPHOS 186 (H) 07/11/2019    EGFR 87 07/11/2019     VTE Pharmacologic Prophylaxis: Enoxaparin (Lovenox)  VTE Mechanical Prophylaxis: sequential compression device

## 2019-07-11 NOTE — UTILIZATION REVIEW
Initial Clinical Review    Elective OP surgical procedure - Converted to IP Surgical Procedure  Age/Sex: 69 y o  female     Surgery Date: 7/10/2019    Procedure: Diagnostic laparoscopyProcedure(s) (LRB):  DANNY BSO  Extensive Lysis of bowel adhesions over sew of serosal tear and closure of mesenteric defect     Anesthesia: General    Operative Findings: Alex Desai under anesthesia revealed the uterus to be small anterior and without adnexal masses  Laparoscopic evaluation revealed extensive bowel adhesions and inability to visualize the pelvic contents with either a infraumbilical port or a upper left quadrant port  Laparotomy findings were extensive adhesions with small bowel  Normal small uterus ovaries and tubes  There were some small excrescences on the fallopian tubes and a portion was sent for frozen section returned as small paratubal cysts benign  The end of the procedure Donaldson catheter was draining clear yellow urine in adequate amounts     Admission Orders: Date/Time/Statement: 7/10/19 @ 1740   Orders Placed This Encounter   Procedures    Inpatient Admission     Standing Status:   Standing     Number of Occurrences:   1     Order Specific Question:   Admitting Physician     Answer:   Akiko Alston     Order Specific Question:   Level of Care     Answer:   Med Surg [16]     Order Specific Question:   Estimated length of stay     Answer:   More than 2 Midnights     Order Specific Question:   Certification     Answer:   I certify that inpatient services are medically necessary for this patient for a duration of greater than two midnights  See H&P and MD Progress Notes for additional information about the patient's course of treatment       Vital Signs: /81 (BP Location: Right arm)   Pulse 75   Temp 97 9 °F (36 6 °C) (Oral)   Resp 18   Ht 5' 9" (1 753 m)   Wt 87 8 kg (193 lb 9 oz)   SpO2 95%   BMI 28 58 kg/m²      Diet: Clear Liquids  Mobility: Ambulate q shift  DVT Prophylaxis: SCD's    Scheduled Meds:  Current Facility-Administered Medications:  acetaminophen 975 mg Oral Q6H [address]     amLODIPine 2 5 mg Oral Daily     atenolol 25 mg Oral HS     atorvastatin 10 mg Oral HS     enoxaparin 40 mg Subcutaneous Daily     HYDROmorphone 0 5 mg Intravenous Q1H PRN     ibuprofen 400 mg Oral Q6H PRN     lactated ringers 100  mL/hr Intravenous Continuous     ondansetron 4 mg Intravenous Q6H PRN     oxyCODONE 5 mg Oral Q4H PRN     oxyCODONE-acetaminophen 1 tablet Oral Once PRN     pantoprazole 40 mg Oral Early Morning     PARoxetine 20 mg Oral Daily Katrina Freitas MD        Network Utilization Review Department  Phone: [phone]; Fax [phone]  [email or web address]  ATTENTION: Please call with any questions or concerns to [phone]  and carefully listen to the prompts so that you are directed to the right person  Send all requests for admission clinical reviews, approved or denied determinations and any other requests to fax [phone]   All voicemails are confidential

## 2019-07-11 NOTE — LETTER
July 11, 2019     Patient: Art Monroe   YOB: 1945   Date of Visit: 7/11/2019       To Whom It May Concern:    Please be advised that Shaq Leidy was accompanying her mother Art Monroe at her surgery on   07/10/2019 and post operatively on 07/11/2019  If you have any questions or concerns, please don't hesitate to call our office            Sincerely,        Parviz Sherman MA    CC: No Recipients

## 2019-07-11 NOTE — PROGRESS NOTES
Progress Note - OB/GYN   aMi Londono 69 y o  female MRN: [id number]  Unit/Bed#: [address] [place] [address] Encounter: [id number]    Assessment:  Stable post operative status  Reviewed surgical procedure     Plan:  Continue postoperative care  Advance activity as tolerated  discontiune shah when OOB  Continue clear fluid diet until passing flatus    Subjective/Objective   Chief Complaint: overall better today    Subjective: mild pain but controlled  No nausea or vomiting - no flatus  No SOB       Objective: comfortable appearing older female       Vitals: Blood pressure 149/81, pulse 75, temperature 97 9 °F (36 6 °C), temperature source Oral, resp  rate 18, height 5' 9" (1 753 m), weight 87 8 kg (193 lb 9 oz), SpO2 95 %  ,Body mass index is 28 58 kg/m²  Intake/Output Summary (Last 24 hours) at 7/11/2019 0823  Last data filed at 7/11/2019 8108  Gross per 24 hour   Intake 4590 ml   Output 1350 ml   Net 3240 ml       Invasive Devices     Peripheral Intravenous Line            Peripheral IV 07/10/19 Left Hand less than 1 day    Peripheral IV 07/10/19 Right Hand less than 1 day          Drain            Urethral Catheter Latex 16 Fr  less than 1 day                Physical Exam:   Older female - NAD  Abd soft slightly distended surgically tender but no guarding   Incision small incisions all intact with glue / steristrips  pfannenstiel incision C, D and I  - closed with staples  Legs - benign with venodynes intact     Lab, Imaging and other studies: I have personally reviewed pertinent reports

## 2019-07-11 NOTE — SOCIAL WORK
Current LOS - 2 days    SW met with pt to assess needs and discuss plans  Discussed goals of making sure pt's needs are met upon discharge  Pt lives in her home with her daughter  Pt is independent, driving  No need for DME or HHC  Pt's PCP is Dr Sofiya Reagan MD   Preferred pharmacy is [facility]  Per pt she has prescription coverage and has no difficulty getting her medication as prescribed  Pt does not currently have POA/advanced directives  Offered information on and assistance with completing advanced directive  Pt requested forms to review and will consider completing paperwork this admission  Living will and DPOA for Healthcare forms given to pt  Pt's plan is to return home with daughter when discharged  No discharge needs expressed or anticipated by pt at this time  Offered support in future if needed

## 2019-07-12 LAB — MRSA NOSE QL CULT: NORMAL

## 2019-07-12 PROCEDURE — 99024 POSTOP FOLLOW-UP VISIT: CPT | Performed by: OBSTETRICS & GYNECOLOGY

## 2019-07-12 PROCEDURE — 99024 POSTOP FOLLOW-UP VISIT: CPT | Performed by: SURGERY

## 2019-07-12 RX ORDER — ONDANSETRON 4 MG/1
4 TABLET, ORALLY DISINTEGRATING ORAL EVERY 6 HOURS PRN
Status: DISCONTINUED | OUTPATIENT
Start: 2019-07-12 | End: 2019-07-13 | Stop reason: HOSPADM

## 2019-07-12 RX ORDER — ACETAMINOPHEN 325 MG/1
650 TABLET ORAL EVERY 6 HOURS PRN
Status: DISCONTINUED | OUTPATIENT
Start: 2019-07-12 | End: 2019-07-13 | Stop reason: HOSPADM

## 2019-07-12 RX ORDER — AMLODIPINE BESYLATE 2.5 MG/1
2.5 TABLET ORAL DAILY
Status: COMPLETED | OUTPATIENT
Start: 2019-07-12 | End: 2019-07-12

## 2019-07-12 RX ADMIN — PANTOPRAZOLE SODIUM 40 MG: 40 TABLET, DELAYED RELEASE ORAL at 06:36

## 2019-07-12 RX ADMIN — ATENOLOL 25 MG: 25 TABLET ORAL at 21:17

## 2019-07-12 RX ADMIN — PAROXETINE 20 MG: 20 TABLET, FILM COATED ORAL at 21:17

## 2019-07-12 RX ADMIN — ONDANSETRON 4 MG: 4 TABLET, ORALLY DISINTEGRATING ORAL at 06:36

## 2019-07-12 RX ADMIN — ATORVASTATIN CALCIUM 10 MG: 10 TABLET, FILM COATED ORAL at 21:17

## 2019-07-12 RX ADMIN — ZOLPIDEM TARTRATE 5 MG: 5 TABLET, COATED ORAL at 21:19

## 2019-07-12 RX ADMIN — AMLODIPINE BESYLATE 2.5 MG: 2.5 TABLET ORAL at 15:12

## 2019-07-12 RX ADMIN — ONDANSETRON 4 MG: 4 TABLET, ORALLY DISINTEGRATING ORAL at 16:58

## 2019-07-12 RX ADMIN — ENOXAPARIN SODIUM 40 MG: 40 INJECTION SUBCUTANEOUS at 09:27

## 2019-07-12 NOTE — PROGRESS NOTES
Checked in on patient inpatient  Pt sitting up in bed talking with visitors (neighbors)  Pt states she ate some eggs and potatoes this morning which stayed down  Denies any nausea or vomiting  Does continue to have belching but thinks due to GERD  She is awaiting her lunch tray to arrive and plans on eating lunch  She was walking around the [place] earlier without any difficulties  Denies any lightheadedness or dizziness  Feeling well  Denies any problems with urinating  Is passing gas, has not had a bowel movement yet  Taking Tylenol as needed for pain, pain well controlled per patient  Spoke about staying another night for observation, pt agreeable and would prefer to stay  Dr Yarely Enriquez will see her tomorrow for discharge as long as no changes overnight

## 2019-07-12 NOTE — PROGRESS NOTES
General Surgery Progress Note    Chief Complaint: "I am passing gas  I have not had a bowel movement yet "    Subjective/24 hour/interim events:  Other than slight incisional pain, patient is doing well  Patient did have a bout of nausea and vomiting this morning and reports having eructation  However patient clarifies that she has a baseline history of a hiatal hernia and pretty severe GERD and notes that she has regular eructations throughout the day  Patient states that this is pretty much at her baseline at this point  She continues to pass flatus and overall did tolerate the liquid diet yesterday and wanted to have more this morning and therefore was advanced by Dr Yarely Enriquez to regular diet this morning  She has tolerated half of that pretty well  No nausea or vomiting since  Objective:  /76 (BP Location: Right arm)   Pulse 71   Temp 98 1 °F (36 7 °C) (Oral)   Resp 17   Ht 5' 9" (1 753 m)   Wt 87 8 kg (193 lb 9 oz)   SpO2 95%   BMI 28 58 kg/m²   General:  No acute distress  Abd:  Softer  Less distended  Pfannenstiel incision looks to be well approximated without any signs of infection  Abdominal binder in place  Ext:  SCDs in place  Pertinent labs reviewed  Hemoglobin stable today  Pertinent notes reviewed  Pertinent imaging reviewed  Assessment and Plan  In speaking with Dr Yarely Enriquez, as the hemoglobin is stable and the patient had lost her IV access, we will not moved to give her Venofer  Patient can remain at regular diet as long as she does not have any nausea, vomiting, or distention  Patient will be seen and examined once again in the afternoon by the nurse practitioner with OB-GYN  From the general surgery standpoint, patient is progressing well  Counseling / Coordination of Care  Total floor/unit time spent today 20 minutes  Greater than 50% of total time was spent with the patient and/or family counseling and/or coordination of care    A description of the counseling / coordination of care:  I performed an interim history, pertinent images and labs, performed a physical examination to arrive at the plan delineated above with associated thought processes  Plan discussed/conveyed with Dr Ramila Moore of primary team     [facility]  Trauma, Acute Care, and [facility] [address]:  [address] [facility], [address] [place] [address]  Office - [phone]  Fax - [phone]    [facility]:  [address]    [facility]  [place]  Office - [phone]  Fax - [phone]    The areas in bold, if present, are summary statements/bullet points for nursing and consultant/primary teams  Portions of the record may have been created with voice recognition software  Occasional wrong word or "sound a like" substitutions may have occurred due to the inherent limitations of voice recognition software  Read the chart carefully and recognize, using context, where substitutions have occurred

## 2019-07-12 NOTE — PROGRESS NOTES
Date: 07/12/19  Procedure: DANNY - BSO, lysis of bowel adhesions  Postop Day #: 2     SUBJECTIVE:    Pain: slight incisional pain, but controlled  Tolerating Oral Intake: yes - clears but did have episode of vomiting this morning  Voiding: yes  Flatus: yes - began this am   Bowel Movement: no  Vaginal bleeding: no  Ambulating: yes - without issue - no dizziness or lightheadedness   Chest Pain: no  Shortness of Breath: no  Leg Pain/Discomfort: no      OBJECTIVE:   Vitals: Temp:  [97 9 °F (36 6 °C)-98 6 °F (37 °C)] 98 6 °F (37 °C)  HR:  [75-90] 75  Resp:  [18-20] 18  BP: (149-180)/(74-85) 164/74  General: No Acute Distress older white female   Cardiovascular: Regular, Rate and Rhythm, no murmur, normal S1/S2   Lungs: Clear to Auscultation Bilaterally, no wheezing, non-labored breathing - good deep breaths   Abdomen: normal bowel sounds, Soft, non-distended, surgically tender, no rebound, guarding   Incision: clean, dry, and intact and closed with staples  Lower Extremities: Non-tender, SCDs on       LABS / TESTS / MEDICATION:    Recent Results (from the past 72 hour(s))   Fingerstick Glucose (POCT)    Collection Time: 07/10/19 12:23 PM   Result Value Ref Range    POC Glucose 87 65 - 140 mg/dl   CBC and Platelet    Collection Time: 07/10/19 12:35 PM   Result Value Ref Range    WBC 7 98 4 31 - 10 16 Thousand/uL    RBC 4 13 3 81 - 5 12 Million/uL    Hemoglobin 9 1 (L) 11 5 - 15 4 g/dL    Hematocrit 31 1 (L) 34 8 - 46 1 %    MCV 75 (L) 82 - 98 fL    MCH 22 0 (L) 26 8 - 34 3 pg    MCHC 29 3 (L) 31 4 - 37 4 g/dL    RDW 17 8 (H) 11 6 - 15 1 %    Platelets 447 (H) 092 - 390 Thousands/uL    MPV 9 5 8 9 - 12 7 fL   Tissue Exam    Collection Time: 07/10/19  2:22 PM   Result Value Ref Range    Case Report       Surgical Pathology Report                         Case: [id number]                                   Authorizing Provider:  Ciro Garza MD            Collected:           07/10/2019 1422              Ordering Location: [address] Received:            07/10/2019 1443                                     Operating Room                                                               Pathologist:           Braeden Calderon MD                                                        Intraop:               Braeden Calderon MD                                                        Specimen:    Fallopian Tube, Right, fallopian tube with implants r/o endometrial ca                     Intraoperative Consultation       AF1  Fallopian Tube, Right:  - Fallopian tube with paratubal cysts    Report telephonically communicated to Dr Clayton Garcia on 07/10/2019 at 2:55 p m  [facility] Dr Marely Buenrostro metabolic panel    Collection Time: 07/11/19  5:47 AM   Result Value Ref Range    Sodium 141 136 - 145 mmol/L    Potassium 4 0 3 5 - 5 3 mmol/L    Chloride 106 100 - 108 mmol/L    CO2 27 21 - 32 mmol/L    ANION GAP 8 4 - 13 mmol/L    BUN 10 5 - 25 mg/dL    Creatinine 0 69 0 60 - 1 30 mg/dL    Glucose 138 65 - 140 mg/dL    Calcium 8 1 (L) 8 3 - 10 1 mg/dL    AST 49 (H) 5 - 45 U/L    ALT 52 12 - 78 U/L    Alkaline Phosphatase 186 (H) 46 - 116 U/L    Total Protein 6 0 (L) 6 4 - 8 2 g/dL    Albumin 2 6 (L) 3 5 - 5 0 g/dL    Total Bilirubin 0 60 0 20 - 1 00 mg/dL    eGFR 87 ml/min/1 73sq m   CBC and Platelet    Collection Time: 07/11/19  5:47 AM   Result Value Ref Range    WBC 17 17 (H) 4 31 - 10 16 Thousand/uL    RBC 3 85 3 81 - 5 12 Million/uL    Hemoglobin 8 4 (L) 11 5 - 15 4 g/dL    Hematocrit 29 0 (L) 34 8 - 46 1 %    MCV 75 (L) 82 - 98 fL    MCH 21 8 (L) 26 8 - 34 3 pg    MCHC 29 0 (L) 31 4 - 37 4 g/dL    RDW 17 9 (H) 11 6 - 15 1 %    Platelets 118 (H) 812 - 390 Thousands/uL    MPV 9 6 8 9 - 12 7 fL         amLODIPine 2 5 mg Oral Daily   atenolol 25 mg Oral HS   atorvastatin 10 mg Oral HS   enoxaparin 40 mg Subcutaneous Daily   pantoprazole 40 mg Oral Early Morning   PARoxetine 20 mg Oral Daily zolpidem 5 mg Oral HS       acetaminophen 650 mg Q6H PRN   HYDROmorphone 0 5 mg Q1H PRN   ibuprofen 400 mg Q6H PRN   ondansetron 4 mg Q6H PRN   oxyCODONE 5 mg Q4H PRN       ASSESSMENT AND PLAN:   POD #2  Good recovery to date s/p DANNY - BSO and lysis of bowel adhesions,  Did have episode of emesis with passage of large amount of flatus and relief  - but is not nauseated - will advance diet, watch for good return of bowel function and discontinue scheduled tylenol - patient aware to ask for pain medications      BP improved with resumed home medications  Continue to advance diet and activity and if doing well - possible discharge to home tomorrow afternoon    Signature / Title: Jose Baldwin MD, Ob/Gyn  Date: 7/12/2019  Time: 6:18 AM    T

## 2019-07-13 VITALS
DIASTOLIC BLOOD PRESSURE: 70 MMHG | HEART RATE: 78 BPM | HEIGHT: 69 IN | BODY MASS INDEX: 28.67 KG/M2 | RESPIRATION RATE: 17 BRPM | SYSTOLIC BLOOD PRESSURE: 138 MMHG | WEIGHT: 193.56 LBS | TEMPERATURE: 98.2 F | OXYGEN SATURATION: 98 %

## 2019-07-13 PROBLEM — Z01.818 PRE-OP EXAM: Status: RESOLVED | Noted: 2019-06-06 | Resolved: 2019-07-13

## 2019-07-13 PROCEDURE — 99024 POSTOP FOLLOW-UP VISIT: CPT | Performed by: OBSTETRICS & GYNECOLOGY

## 2019-07-13 RX ADMIN — ONDANSETRON 4 MG: 4 TABLET, ORALLY DISINTEGRATING ORAL at 06:29

## 2019-07-13 RX ADMIN — PANTOPRAZOLE SODIUM 40 MG: 40 TABLET, DELAYED RELEASE ORAL at 06:27

## 2019-07-13 RX ADMIN — ENOXAPARIN SODIUM 40 MG: 40 INJECTION SUBCUTANEOUS at 09:34

## 2019-07-13 NOTE — SOCIAL WORK
Per PCP, pt stable for DC to home today  Call to Unit RN, pt without ride needs and has left the building already  No DCP needs noted

## 2019-07-13 NOTE — NURSING NOTE
No IV access, all belongings accounted for  After visit summary reviewed and given to patient  Patient left unit, by wheelchair at 0945, accompanied by friend and PCA

## 2019-07-13 NOTE — PLAN OF CARE
Problem: Potential for Falls  Goal: Patient will remain free of falls  Description  INTERVENTIONS:  - Assess patient frequently for physical needs  -  Identify cognitive and physical deficits and behaviors that affect risk of falls  -  [place] fall precautions as indicated by assessment   - Educate patient/family on patient safety including physical limitations  - Instruct patient to call for assistance with activity based on assessment  - Modify environment to reduce risk of injury  - Consider OT/PT consult to assist with strengthening/mobility  Outcome: Progressing --Patient is aware to call for assistance when needed     Problem: PAIN - ADULT  Goal: Verbalizes/displays adequate comfort level or baseline comfort level  Description  Interventions:  - Encourage patient to monitor pain and request assistance  - Assess pain using appropriate pain scale  - Administer analgesics based on type and severity of pain and evaluate response  - Implement non-pharmacological measures as appropriate and evaluate response  - Consider cultural and social influences on pain and pain management  - Notify physician/advanced practitioner if interventions unsuccessful or patient reports new pain  Outcome: Progressing-- Patient appropriately uses numeric pain scale   Patient achieving her pain goal of 2/10 on numeric pain scale     Problem: INFECTION - ADULT  Goal: Absence or prevention of progression during hospitalization  Description  INTERVENTIONS:  - Assess and monitor for signs and symptoms of infection  - Monitor lab/diagnostic results  - Monitor all insertion sites, i e  indwelling lines, tubes, and drains  - [place] appropriate cooling/warming therapies per order  - Administer medications as ordered  - Instruct and encourage patient and family to use good hand hygiene technique  - Identify and instruct in appropriate isolation precautions for identified infection/condition   Outcome: Progressing-- Surgical suture line remains clean and without s/s of infection     Problem: SAFETY ADULT  Goal: Patient will remain free of falls  Description  INTERVENTIONS:  - Assess patient frequently for physical needs  -  Identify cognitive and physical deficits and behaviors that affect risk of falls    -  [place] fall precautions as indicated by assessment   - Educate patient/family on patient safety including physical limitations  - Instruct patient to call for assistance with activity based on assessment  - Modify environment to reduce risk of injury  - Consider OT/PT consult to assist with strengthening/mobility  Outcome: Progressing-- See above Potential For Falls     Problem: DISCHARGE PLANNING  Goal: Discharge to home or other facility with appropriate resources  Description  INTERVENTIONS:  - Identify barriers to discharge w/patient and caregiver  - Arrange for needed discharge resources and transportation as appropriate  - Identify discharge learning needs (meds, wound care, etc )  - Arrange for interpretive services to assist at discharge as needed  - Refer to Case Management Department for coordinating discharge planning if the patient needs post-hospital services based on physician/advanced practitioner order or complex needs related to functional status, cognitive ability, or social support system  Outcome: Progressing- - Patient will go home with her daughter     Problem: INFECTION - ADULT  Goal: Absence of fever/infection during neutropenic period  Description  INTERVENTIONS:  - Monitor WBC  - Implement neutropenic guidelines  Outcome: Completed     Problem: SAFETY ADULT  Goal: Maintain or return to baseline ADL function  Description  INTERVENTIONS:  -  Assess patient's ability to carry out ADLs; assess patient's baseline for ADL function and identify physical deficits which impact ability to perform ADLs (bathing, care of mouth/teeth, toileting, grooming, dressing, etc )  - Assess/evaluate cause of self-care deficits   - Assess range of motion  - Assess patient's mobility; develop plan if impaired  - Assess patient's need for assistive devices and provide as appropriate  - Encourage maximum independence but intervene and supervise when necessary  ¯ Involve family in performance of ADLs  ¯ Assess for home care needs following discharge   ¯ Request OT consult to assist with ADL evaluation and planning for discharge  ¯ Provide patient education as appropriate  Outcome: Completed

## 2019-07-13 NOTE — PROGRESS NOTES
Progress Note - OB/GYN   Jaqueline Hands 69 y o  female MRN: [id number]  Unit/Bed#: [address] [place] [address] Encounter: [id number]    Assessment:  Good recovery POD # 3 s/p DANNY -BSO, lysis of bowel adhesions      Plan:  Discharge to home  Instructions reviewed  RTO as scheduled 7/17    Subjective/Objective   Chief Complaint: doing much better today    Subjective:   Pain controlled with no medications  Ambulating well - no dizziness  No SOB or chest pain  Slight nausea but no further vomiting - is eating regular diet lightly and is otherwise fine with po  Flatus but no BM - but feels like it is there  - no abdominal discomfort   Voiding without issue  No vaginal bleeding        Vitals: Blood pressure 143/70, pulse 80, temperature 99 1 °F (37 3 °C), resp  rate 18, height 5' 9" (1 753 m), weight 87 8 kg (193 lb 9 oz), SpO2 93 %  ,Body mass index is 28 58 kg/m²  Intake/Output Summary (Last 24 hours) at 7/13/2019 6661  Last data filed at 7/13/2019 0301  Gross per 24 hour   Intake    Output 1750 ml   Net -1750 ml       Invasive Devices     None                 Physical Exam: well nourished older female in NAD  Chest CTA COR  RRR without murmurs  Abd good BS - slightly distended but soft and nontender, Incisions intact and clean and dry  Legs benign     Lab, Imaging and other studies: I have personally reviewed pertinent reports

## 2019-07-15 ENCOUNTER — TRANSITIONAL CARE MANAGEMENT (OUTPATIENT)
Dept: FAMILY MEDICINE CLINIC | Facility: CLINIC | Age: 74
End: 2019-07-15

## 2019-07-16 NOTE — DISCHARGE SUMMARY
Discharge Summary -   Cynthia Ambrose 69 y o  female MRN: [id number]  Unit/Bed#: [place] Encounter: [id number]    Admission Date: 7/10/2019     Discharge Date: 7/13/2019    Attending: Bart Whittaker  Intraoperative consult: Mandie Baldwin     Principal Diagnosis:  Post-menopausal bleeding [N95 0]  Complex atypical endometrial hyperplasia [N85 02]    Secondary Diagnosis: extensive small bowel adhesions    Procedures: DANNY - BSO, diagnostic laparoscopy, extensive lysis of small bowel adhesions, oversew of serosal tear and closure of mesenteric defect     Hospital Course: Patient underwent surgical procedure noted above  Her recovery was excellent  She is ambulating well, voiding on her own, passing flatus, and tolerating oral intake  Complications: None,     Condition at discharge: good     Discharge Medications: For a complete list of the patient's medications, please refer to her med rec  Discharge instructions/Information to patient and family:   See after visit summary for information provided to patient and family  Provisions for Follow-Up Care:  See after visit summary for information related to follow-up care and any pertinent home health orders  Disposition: See After Visit Summary for discharge disposition information      Planned Readmission: No

## 2019-07-17 ENCOUNTER — OFFICE VISIT (OUTPATIENT)
Dept: OBGYN CLINIC | Facility: CLINIC | Age: 74
End: 2019-07-17

## 2019-07-17 VITALS
SYSTOLIC BLOOD PRESSURE: 132 MMHG | BODY MASS INDEX: 27.4 KG/M2 | DIASTOLIC BLOOD PRESSURE: 72 MMHG | HEIGHT: 69 IN | WEIGHT: 185 LBS

## 2019-07-17 DIAGNOSIS — Z48.816 AFTERCARE FOLLOWING SURGERY OF THE GENITOURINARY SYSTEM: ICD-10-CM

## 2019-07-17 DIAGNOSIS — Z90.710 S/P TAH-BSO (TOTAL ABDOMINAL HYSTERECTOMY AND BILATERAL SALPINGO-OOPHORECTOMY): Primary | ICD-10-CM

## 2019-07-17 DIAGNOSIS — C54.1 ENDOMETRIAL CANCER (HCC): ICD-10-CM

## 2019-07-17 DIAGNOSIS — Z90.79 S/P TAH-BSO (TOTAL ABDOMINAL HYSTERECTOMY AND BILATERAL SALPINGO-OOPHORECTOMY): Primary | ICD-10-CM

## 2019-07-17 DIAGNOSIS — Z90.722 S/P TAH-BSO (TOTAL ABDOMINAL HYSTERECTOMY AND BILATERAL SALPINGO-OOPHORECTOMY): Primary | ICD-10-CM

## 2019-07-17 PROCEDURE — 99024 POSTOP FOLLOW-UP VISIT: CPT | Performed by: OBSTETRICS & GYNECOLOGY

## 2019-07-17 NOTE — PROGRESS NOTES
Assessment/Plan:    Normal postoperative check - 1 week s/p DANNY-BSO and lysis of bowel adhesions - encourage return of bowel function with half dose of MOM - call if any other discomfort  Reviewed pathology with endometrial cancer diagnosis - referral placed for Gyn Onc to review pathology and any further treatment  RTO 2 weeks for continued postop care       Problem List Items Addressed This Visit        Genitourinary    Endometrial cancer [facility])    Relevant Orders    Ambulatory referral to Gynecologic Oncology       Other    S/P DANNY-BSO (total abdominal hysterectomy and bilateral salpingo-oophorectomy) - Primary    Relevant Orders    Ambulatory referral to Gynecologic Oncology    Aftercare following surgery of the genitourinary system            Subjective:      Patient ID: Santos Worley is a 69 y o  female  Elena Shelton is here today s/p TAHBSO and lysis of small bowel adhesions  for postop follow-up  She underwent surgery 1 week ago and was discharged home on POD3   She has had only 1 small bowel movement  She has a few episodes of nausea and 1 episode of retching but otherwise feels like she is eating okay  She is voiding well  She is passing  flatus and feels as if she is going to have a bowel movement but nothing much comes out  She has not been taking any stool softeners or any mild laxatives  She is voiding well  She is otherwise not in any distress or discomfort  She has no vaginal bleeding  She has increased her activity well although still feels somewhat weak  Reviewed the pathology with her and explained a little bit about endometrial cancer  She is aware that it has spread into t for this  he body of the uterus but the other tissues were negative  She will follow up with gynecological oncology to see whether not any additional therapy is warranted    She was given a referral      The following portions of the patient's history were reviewed and updated as appropriate: allergies, current medications, past family history, past medical history, past social history, past surgical history and problem list     Review of Systems   Constitutional: Negative for chills, fatigue, fever and unexpected weight change  HENT: Negative for dental problem, sinus pressure and sinus pain  Eyes: Negative for visual disturbance  Respiratory: Negative for cough, shortness of breath and wheezing  Cardiovascular: Negative for chest pain and leg swelling  Gastrointestinal: Negative for constipation, diarrhea, nausea and vomiting  Genitourinary: Negative for menstrual problem, pelvic pain and urgency  Musculoskeletal: Negative for back pain  Allergic/Immunologic: Negative for environmental allergies  Neurological: Negative for dizziness and headaches  Objective: There were no vitals taken for this visit  Physical Exam   Constitutional: She is oriented to person, place, and time  She appears well-developed and well-nourished  No distress  Older white female   Abdominal: Soft  Bowel sounds are normal  She exhibits no distension and no mass  There is no tenderness  There is no rebound and no guarding  The small incisions are well healed, the Pfannenstiel incision has staples intact and these are removed without difficulty  The incision is otherwise clean dry and intact  There is some ecchymosis in the lower abdominal area which is resolving  Neurological: She is alert and oriented to person, place, and time  Psychiatric: She has a normal mood and affect  Vitals reviewed

## 2019-07-21 ENCOUNTER — HOSPITAL ENCOUNTER (INPATIENT)
Facility: HOSPITAL | Age: 74
LOS: 3 days | Discharge: HOME | DRG: 391 | End: 2019-07-24
Attending: EMERGENCY MEDICINE | Admitting: INTERNAL MEDICINE
Payer: MEDICARE

## 2019-07-21 ENCOUNTER — APPOINTMENT (EMERGENCY)
Dept: RADIOLOGY | Facility: HOSPITAL | Age: 74
DRG: 391 | End: 2019-07-21
Attending: EMERGENCY MEDICINE
Payer: MEDICARE

## 2019-07-21 ENCOUNTER — APPOINTMENT (OUTPATIENT)
Dept: RADIOLOGY | Facility: HOSPITAL | Age: 74
DRG: 391 | End: 2019-07-21
Payer: MEDICARE

## 2019-07-21 DIAGNOSIS — K92.0 COFFEE GROUND EMESIS: Primary | ICD-10-CM

## 2019-07-21 DIAGNOSIS — K44.9 HIATAL HERNIA: ICD-10-CM

## 2019-07-21 DIAGNOSIS — K56.609 SBO (SMALL BOWEL OBSTRUCTION) (HCC): ICD-10-CM

## 2019-07-21 DIAGNOSIS — K25.7: ICD-10-CM

## 2019-07-21 DIAGNOSIS — K92.2 UPPER GI BLEED: ICD-10-CM

## 2019-07-21 DIAGNOSIS — K92.2 GI BLEED: ICD-10-CM

## 2019-07-21 PROBLEM — K56.600 PARTIAL INTESTINAL OBSTRUCTION (HCC): Status: ACTIVE | Noted: 2019-07-21

## 2019-07-21 LAB
ABO GROUP BLD: NORMAL
ALBUMIN SERPL BCP-MCNC: 3.4 G/DL (ref 3.5–5)
ALP SERPL-CCNC: 172 U/L (ref 46–116)
ALT SERPL W P-5'-P-CCNC: 25 U/L (ref 12–78)
ANION GAP SERPL CALCULATED.3IONS-SCNC: 13 MMOL/L (ref 4–13)
ANION GAP SERPL CALCULATED.3IONS-SCNC: 7 MMOL/L (ref 4–13)
AST SERPL W P-5'-P-CCNC: 16 U/L (ref 5–45)
BASOPHILS # BLD AUTO: 0.11 THOUSANDS/ΜL (ref 0–0.1)
BASOPHILS NFR BLD AUTO: 1 % (ref 0–1)
BILIRUB SERPL-MCNC: 0.6 MG/DL (ref 0.2–1)
BLD GP AB SCN SERPL QL: NEGATIVE
BUN SERPL-MCNC: 12 MG/DL (ref 5–25)
BUN SERPL-MCNC: 13 MG/DL (ref 5–25)
CALCIUM SERPL-MCNC: 9 MG/DL (ref 8.3–10.1)
CALCIUM SERPL-MCNC: 9 MG/DL (ref 8.3–10.1)
CHLORIDE SERPL-SCNC: 104 MMOL/L (ref 100–108)
CHLORIDE SERPL-SCNC: 98 MMOL/L (ref 100–108)
CO2 SERPL-SCNC: 27 MMOL/L (ref 21–32)
CO2 SERPL-SCNC: 28 MMOL/L (ref 21–32)
CREAT SERPL-MCNC: 0.54 MG/DL (ref 0.6–1.3)
CREAT SERPL-MCNC: 0.83 MG/DL (ref 0.6–1.3)
EOSINOPHIL # BLD AUTO: 0.3 THOUSAND/ΜL (ref 0–0.61)
EOSINOPHIL NFR BLD AUTO: 2 % (ref 0–6)
ERYTHROCYTE [DISTWIDTH] IN BLOOD BY AUTOMATED COUNT: 19.2 % (ref 11.6–15.1)
ERYTHROCYTE [DISTWIDTH] IN BLOOD BY AUTOMATED COUNT: 19.8 % (ref 11.6–15.1)
GFR SERPL CREATININE-BSD FRML MDRD: 71 ML/MIN/1.73SQ M
GFR SERPL CREATININE-BSD FRML MDRD: 95 ML/MIN/1.73SQ M
GLUCOSE P FAST SERPL-MCNC: 113 MG/DL (ref 65–99)
GLUCOSE SERPL-MCNC: 113 MG/DL (ref 65–140)
GLUCOSE SERPL-MCNC: 166 MG/DL (ref 65–140)
HCT VFR BLD AUTO: 29.9 % (ref 34.8–46.1)
HCT VFR BLD AUTO: 34.5 % (ref 34.8–46.1)
HGB BLD-MCNC: 8 G/DL (ref 11.5–15.4)
HGB BLD-MCNC: 8.4 G/DL (ref 11.5–15.4)
HGB BLD-MCNC: 9.8 G/DL (ref 11.5–15.4)
HOLD SPECIMEN: NORMAL
IMM GRANULOCYTES # BLD AUTO: 0.07 THOUSAND/UL (ref 0–0.2)
IMM GRANULOCYTES NFR BLD AUTO: 1 % (ref 0–2)
LIPASE SERPL-CCNC: 128 U/L (ref 73–393)
LYMPHOCYTES # BLD AUTO: 1.34 THOUSANDS/ΜL (ref 0.6–4.47)
LYMPHOCYTES NFR BLD AUTO: 10 % (ref 14–44)
MCH RBC QN AUTO: 21.2 PG (ref 26.8–34.3)
MCH RBC QN AUTO: 21.3 PG (ref 26.8–34.3)
MCHC RBC AUTO-ENTMCNC: 28.1 G/DL (ref 31.4–37.4)
MCHC RBC AUTO-ENTMCNC: 28.4 G/DL (ref 31.4–37.4)
MCV RBC AUTO: 75 FL (ref 82–98)
MCV RBC AUTO: 75 FL (ref 82–98)
MONOCYTES # BLD AUTO: 0.56 THOUSAND/ΜL (ref 0.17–1.22)
MONOCYTES NFR BLD AUTO: 4 % (ref 4–12)
NEUTROPHILS # BLD AUTO: 11.18 THOUSANDS/ΜL (ref 1.85–7.62)
NEUTS SEG NFR BLD AUTO: 82 % (ref 43–75)
NRBC BLD AUTO-RTO: 0 /100 WBCS
PLATELET # BLD AUTO: 505 THOUSANDS/UL (ref 149–390)
PLATELET # BLD AUTO: 640 THOUSANDS/UL (ref 149–390)
PMV BLD AUTO: 9.4 FL (ref 8.9–12.7)
PMV BLD AUTO: 9.5 FL (ref 8.9–12.7)
POTASSIUM SERPL-SCNC: 3.5 MMOL/L (ref 3.5–5.3)
POTASSIUM SERPL-SCNC: 4.3 MMOL/L (ref 3.5–5.3)
PROT SERPL-MCNC: 7.3 G/DL (ref 6.4–8.2)
RBC # BLD AUTO: 3.97 MILLION/UL (ref 3.81–5.12)
RBC # BLD AUTO: 4.61 MILLION/UL (ref 3.81–5.12)
RH BLD: POSITIVE
SODIUM SERPL-SCNC: 138 MMOL/L (ref 136–145)
SODIUM SERPL-SCNC: 139 MMOL/L (ref 136–145)
SPECIMEN EXPIRATION DATE: NORMAL
WBC # BLD AUTO: 13.56 THOUSAND/UL (ref 4.31–10.16)
WBC # BLD AUTO: 16.54 THOUSAND/UL (ref 4.31–10.16)

## 2019-07-21 PROCEDURE — 87081 CULTURE SCREEN ONLY: CPT | Performed by: INTERNAL MEDICINE

## 2019-07-21 PROCEDURE — 85027 COMPLETE CBC AUTOMATED: CPT | Performed by: NURSE PRACTITIONER

## 2019-07-21 PROCEDURE — 96361 HYDRATE IV INFUSION ADD-ON: CPT

## 2019-07-21 PROCEDURE — 99223 1ST HOSP IP/OBS HIGH 75: CPT | Performed by: INTERNAL MEDICINE

## 2019-07-21 PROCEDURE — 99222 1ST HOSP IP/OBS MODERATE 55: CPT | Performed by: INTERNAL MEDICINE

## 2019-07-21 PROCEDURE — 80048 BASIC METABOLIC PNL TOTAL CA: CPT | Performed by: NURSE PRACTITIONER

## 2019-07-21 PROCEDURE — 96374 THER/PROPH/DIAG INJ IV PUSH: CPT

## 2019-07-21 PROCEDURE — 74176 CT ABD & PELVIS W/O CONTRAST: CPT

## 2019-07-21 PROCEDURE — 1124F ACP DISCUSS-NO DSCNMKR DOCD: CPT | Performed by: SURGERY

## 2019-07-21 PROCEDURE — 86901 BLOOD TYPING SEROLOGIC RH(D): CPT | Performed by: EMERGENCY MEDICINE

## 2019-07-21 PROCEDURE — 86900 BLOOD TYPING SEROLOGIC ABO: CPT | Performed by: EMERGENCY MEDICINE

## 2019-07-21 PROCEDURE — 83690 ASSAY OF LIPASE: CPT | Performed by: EMERGENCY MEDICINE

## 2019-07-21 PROCEDURE — 99024 POSTOP FOLLOW-UP VISIT: CPT | Performed by: SPECIALIST

## 2019-07-21 PROCEDURE — 99285 EMERGENCY DEPT VISIT HI MDM: CPT

## 2019-07-21 PROCEDURE — 36415 COLL VENOUS BLD VENIPUNCTURE: CPT | Performed by: EMERGENCY MEDICINE

## 2019-07-21 PROCEDURE — 86850 RBC ANTIBODY SCREEN: CPT | Performed by: EMERGENCY MEDICINE

## 2019-07-21 PROCEDURE — 85025 COMPLETE CBC W/AUTO DIFF WBC: CPT | Performed by: EMERGENCY MEDICINE

## 2019-07-21 PROCEDURE — C9113 INJ PANTOPRAZOLE SODIUM, VIA: HCPCS | Performed by: NURSE PRACTITIONER

## 2019-07-21 PROCEDURE — 93005 ELECTROCARDIOGRAM TRACING: CPT

## 2019-07-21 PROCEDURE — 85018 HEMOGLOBIN: CPT | Performed by: NURSE PRACTITIONER

## 2019-07-21 PROCEDURE — 74022 RADEX COMPL AQT ABD SERIES: CPT

## 2019-07-21 PROCEDURE — 80053 COMPREHEN METABOLIC PANEL: CPT | Performed by: EMERGENCY MEDICINE

## 2019-07-21 PROCEDURE — 86920 COMPATIBILITY TEST SPIN: CPT

## 2019-07-21 RX ORDER — AMLODIPINE BESYLATE 2.5 MG/1
2.5 TABLET ORAL DAILY
Status: DISCONTINUED | OUTPATIENT
Start: 2019-07-22 | End: 2019-07-21

## 2019-07-21 RX ORDER — PAROXETINE HYDROCHLORIDE 20 MG/1
20 TABLET, FILM COATED ORAL
Status: DISCONTINUED | OUTPATIENT
Start: 2019-07-21 | End: 2019-07-24 | Stop reason: HOSPADM

## 2019-07-21 RX ORDER — AMLODIPINE BESYLATE 2.5 MG/1
2.5 TABLET ORAL DAILY
Status: DISCONTINUED | OUTPATIENT
Start: 2019-07-21 | End: 2019-07-21

## 2019-07-21 RX ORDER — ONDANSETRON 2 MG/ML
4 INJECTION INTRAMUSCULAR; INTRAVENOUS ONCE
Status: COMPLETED | OUTPATIENT
Start: 2019-07-21 | End: 2019-07-21

## 2019-07-21 RX ORDER — SODIUM CHLORIDE 9 MG/ML
1000 INJECTION, SOLUTION INTRAVENOUS ONCE
Status: COMPLETED | OUTPATIENT
Start: 2019-07-21 | End: 2019-07-21

## 2019-07-21 RX ORDER — ACETAMINOPHEN 325 MG/1
650 TABLET ORAL EVERY 6 HOURS PRN
Status: DISCONTINUED | OUTPATIENT
Start: 2019-07-21 | End: 2019-07-24 | Stop reason: HOSPADM

## 2019-07-21 RX ORDER — ATENOLOL 25 MG/1
25 TABLET ORAL
Status: DISCONTINUED | OUTPATIENT
Start: 2019-07-21 | End: 2019-07-24 | Stop reason: HOSPADM

## 2019-07-21 RX ORDER — DEXTROSE AND SODIUM CHLORIDE 5; .45 G/100ML; G/100ML
75 INJECTION, SOLUTION INTRAVENOUS CONTINUOUS
Status: DISCONTINUED | OUTPATIENT
Start: 2019-07-21 | End: 2019-07-24 | Stop reason: HOSPADM

## 2019-07-21 RX ORDER — ATORVASTATIN CALCIUM 10 MG/1
10 TABLET, FILM COATED ORAL
Status: DISCONTINUED | OUTPATIENT
Start: 2019-07-21 | End: 2019-07-24 | Stop reason: HOSPADM

## 2019-07-21 RX ORDER — ONDANSETRON 2 MG/ML
4 INJECTION INTRAMUSCULAR; INTRAVENOUS EVERY 6 HOURS PRN
Status: DISCONTINUED | OUTPATIENT
Start: 2019-07-21 | End: 2019-07-24 | Stop reason: HOSPADM

## 2019-07-21 RX ADMIN — ONDANSETRON 4 MG: 2 INJECTION INTRAMUSCULAR; INTRAVENOUS at 02:30

## 2019-07-21 RX ADMIN — ATORVASTATIN CALCIUM 10 MG: 10 TABLET, FILM COATED ORAL at 21:45

## 2019-07-21 RX ADMIN — Medication 80 MG: at 05:45

## 2019-07-21 RX ADMIN — SODIUM CHLORIDE 8 MG/HR: 9 INJECTION, SOLUTION INTRAVENOUS at 07:49

## 2019-07-21 RX ADMIN — IOHEXOL 50 ML: 240 INJECTION, SOLUTION INTRATHECAL; INTRAVASCULAR; INTRAVENOUS; ORAL at 08:09

## 2019-07-21 RX ADMIN — DEXTROSE AND SODIUM CHLORIDE 75 ML/HR: 5; .45 INJECTION, SOLUTION INTRAVENOUS at 10:59

## 2019-07-21 RX ADMIN — ATENOLOL 25 MG: 25 TABLET ORAL at 21:45

## 2019-07-21 RX ADMIN — PAROXETINE 20 MG: 20 TABLET, FILM COATED ORAL at 21:45

## 2019-07-21 RX ADMIN — AMLODIPINE BESYLATE 2.5 MG: 2.5 TABLET ORAL at 08:11

## 2019-07-21 RX ADMIN — SODIUM CHLORIDE 1000 ML/HR: 0.9 INJECTION, SOLUTION INTRAVENOUS at 02:28

## 2019-07-21 RX ADMIN — ONDANSETRON 4 MG: 2 INJECTION INTRAMUSCULAR; INTRAVENOUS at 04:22

## 2019-07-21 RX ADMIN — SODIUM CHLORIDE 8 MG/HR: 9 INJECTION, SOLUTION INTRAVENOUS at 16:29

## 2019-07-21 NOTE — CONSULTS
Consultation - [id number] 69 y o  female MRN: [id number]  Unit/Bed#: [address] [place] [address] Encounter: [id number]    Assessment/Plan     Assessment:  · Upper GI Bleed: Presented to ED with coffee brown emesis  Hgb at Baseline 9 8  Denies blood in stools, dark tarry stools abdominal pain  Last BM early this morning loose stool  S/p  laparoscopic hysterectomy with b/l salpingo-oophrectomy and extensive TAMMY with closure of mesenteric defect on 7/10  Obstruction series showed: Scattered mildly dilated small bowel loops with air-fluid levels  AVSS  WBC 13 56 > 16 54  Concern for SBO secondary adhesion vs possible pelvic fluid collection causing ileus  CT abdomen/pelvis pending  Plan:  NPO  CT abdomen pelvis pending  Protonix drip  PRN Zofran  Continue to monitor H&H  History of Present Illness     HPI:  Ricardo Bartlett is a 69 y o  female past medical history significant for HTN, HLD, chronic anemia Stenosis of left carotid artery, asthma  Previous abdominal surgeries include appendectomy, cholecystectomy  Patient is  S/p  laparoscopic hysterectomy with b/l salpingo-oophrectomy and extensive TAMMY with closure of mesenteric defect on 7/10  Patient presented to ED on 07/20/2019 with nausea and vomiting that started around 9 pm  Patient's daughter reports she noticed it was brown in color around midnight  Patient reports since her surgery she has felt nauseous on and off  Patient reports having a loose BM this morning and a Bowel movent last night of regular consistency  Denies any blood in stools, dark tarry stools, alfonso blood in toilet  Patient denies any abdominal pain or discomfort  Inpatient consult to Acute Care Surgery  Consult performed by: Uriah Parker PA-C  Consult ordered by: MANA Fierro          Review of Systems   Constitutional: Positive for activity change  Respiratory: Negative for chest tightness and shortness of breath      Cardiovascular: Negative for chest pain, palpitations and leg swelling  Gastrointestinal: Positive for nausea and vomiting  Negative for abdominal distention, abdominal pain, anal bleeding, blood in stool, constipation, diarrhea and rectal pain  Genitourinary: Negative for dysuria  Skin: Negative for rash and wound  Neurological: Negative for dizziness  Historical Information   Past Medical History:   Diagnosis Date    Abnormal blood chemistry     abnormal biochemistry findings    Abnormal findings on diagnostic imaging of urinary organs     resolved 07/01/2016    Abnormal glucose     resolved 07/01/2016    Abnormal thyroid exam     resolved 07/01/2016    Abnormal thyroid screen (blood)     resolved 07/01/2016    Abnormal TSH     last assessed 03/07/2016    Allergic     Allergic rhinitis     last assessed 06/25/2015    Anemia     Arthritis     Asthma     Closed Colles' fracture     right wrist, last assessed 01/17/2014    Coronary artery disease     "blockages"  in left carotid artery  - not in the heart(per pt)            sees Dr Saba Jamil Depression     with insomnia    Diverticulosis     occ diverticulitis    Edema     last assessed 03/11/2015    Environmental allergies     GERD (gastroesophageal reflux disease)     Hematuria     last assessed 11/07/2013    Hiatal hernia     History of transfusion     had 2 units-11/16    Hyperlipidemia     Hypertension     Iron deficiency anemia     chronic-receives iron infusion usually every 6 months    Ischemic colitis ([facility] [address] )     last assessed 12/07/2016    Knee mass     last assessed 06/11/2014    Orthostatic hypotension     last assessed 03/26/2014    Osteoarthrosis of knee     last assessed 11/12/2014    Palpitations     PMB (postmenopausal bleeding)     last assessed 11/07/2013    Polyarthralgia     last assessed 06/09/2014    Posthemorrhagic anemia     last assessed 11/07/2013     Past Surgical History:   Procedure Laterality Date    ABDOMINAL ADHESION SURGERY N/A 7/10/2019    Procedure: EXTENSIVE LYSIS ADHESIONS, OVER SEW SEROSAL TEAR, CLOSURE MESENTERIC DEFECT;  Surgeon: Michoacano Fuentes MD;  Location: [address] [place] [address];  Service: General    APPENDECTOMY      BREAST SURGERY Bilateral     exc  lashawn masses-benign    CATARACT EXTRACTION      CATARACT EXTRACTION W/ INTRAOCULAR LENS IMPLANT Right 2/6/2017    Procedure: EXTRACTION EXTRACAPSULAR CATARACT PHACO INTRAOCULAR LENS (IOL); Surgeon: Dacia Gonzalez MD;  Location[facility] MAIN OR;  Service:     CATARACT EXTRACTION W/ INTRAOCULAR LENS IMPLANT Left 1/9/2017    Procedure: EXTRACTION EXTRACAPSULAR CATARACT PHACO INTRAOCULAR LENS (IOL); Surgeon: Dacia Gonzalez MD;  Location: [facility] MAIN OR;  Service:     CHOLECYSTECTOMY      lap    COLONOSCOPY N/A 12/3/2016    Procedure: COLONOSCOPY;  Surgeon: Darren Rosenthal MD;  Location: [facility] GI LAB; Service:     CYSTOSCOPY  06/2011    bladder biopsy, lashawn  retrogrades    DILATION AND CURETTAGE OF UTERUS      x2    ESOPHAGOGASTRODUODENOSCOPY N/A 12/2/2016    Procedure: ESOPHAGOGASTRODUODENOSCOPY (EGD); Surgeon: Jaci Woodruff MD;  Location: [facility] GI LAB; Service:     ESOPHAGOGASTRODUODENOSCOPY N/A 6/5/2017    Procedure: ESOPHAGOGASTRODUODENOSCOPY (EGD); Surgeon: Darren Rosenthal MD;  Location: [facility] GI LAB; Service:     FRACTURE SURGERY Right     distal radius repair w/hardware    HAND TENDON SURGERY Right     laceration repair    HERNIA REPAIR      umbilical    HYSTERECTOMY  07/10/2019    IR IMAGE GUIDED BIOPSY/ASPIRATION LIVER  12/12/2018    KNEE SURGERY Right 07/03/2014    mass removed    PELVIC LAPAROSCOPY Bilateral 7/10/2019    Procedure: SALPINGO-OOPHORECTOMY, LAPAROSCOPIC;  Surgeon: Bao Rodriguez MD;  Location: [address] [place] [address];  Service: Gynecology    TN LAP,VAG HYST,UTERUS 250GMS/<,SALP-OOPH N/A 7/10/2019    Procedure: HYSTERECTOMY LAPAROSCOPIC ASSISTED VAGINAL (LAVH);   Surgeon: Bao Rodriguez MD;  Location: [address] [place] [address];  Service: Gynecology    TOTAL SHOULDER ARTHROPLASTY Right 7/14/2016    Procedure: ARTHROPLASTY SHOULDER with subacromial decompression, distal clavicle excision and possible rotator cuff repair,limited debridement of laboral tear;  Surgeon: Jasvir Spangler MD;  Location[facility] MA[facility] OR;  Service:     WRIST SURGERY Right 12/2013    repair fracture with hardware     Social History   Social History     Substance and Sexual Activity   Alcohol Use Not Currently    Frequency: Never    Drinks per session: Patient refused    Binge frequency: Never     Social History     Substance and Sexual Activity   Drug Use No     Social History     Tobacco Use   Smoking Status Never Smoker   Smokeless Tobacco Never Used     Family History: non-contributory    Meds/Allergies   all current active meds have been reviewed  Allergies   Allergen Reactions    Shellfish-Derived Products Anaphylaxis     seafood    Sulfa Antibiotics Anaphylaxis       Objective   First Vitals:   Blood Pressure: 153/70 (07/21/19 0220)  Pulse: 82 (07/21/19 0220)  Temperature: 98 4 °F (36 9 °C) (07/21/19 0220)  Temp Source: Tympanic (07/21/19 0220)  Respirations: 14 (07/21/19 0220)  Height: 5' 7" (170 2 cm) (07/21/19 0220)  Weight - Scale: 83 9 kg (185 lb) (07/21/19 0220)  SpO2: 96 % (07/21/19 0220)    Current Vitals:   Blood Pressure: 137/67 (07/21/19 0809)  Pulse: 74 (07/21/19 0422)  Temperature: 98 1 °F (36 7 °C) (07/21/19 0809)  Temp Source: Oral (07/21/19 0809)  Respirations: 16 (07/21/19 0809)  Height: 5' 7" (170 2 cm) (07/21/19 0220)  Weight - Scale: 83 9 kg (185 lb) (07/21/19 0220)  SpO2: 98 % (07/21/19 0809)    No intake or output data in the 24 hours ending 07/21/19 1008    Invasive Devices     Peripheral Intravenous Line            Peripheral IV 07/21/19 Right Antecubital less than 1 day                Physical Exam   Constitutional: She is oriented to person, place, and time  She appears well-developed and well-nourished     Cardiovascular: Normal rate, regular rhythm and normal heart sounds  Pulmonary/Chest: Effort normal and breath sounds normal  No respiratory distress  Abdominal: Soft  Bowel sounds are normal  She exhibits no distension and no mass  There is no tenderness  There is no rebound and no guarding  No hernia  Abdominal incisions clean dry intact no signs of erythema edema or discharge healing nicely   Neurological: She is alert and oriented to person, place, and time  Skin: No rash noted  No erythema  Psychiatric: She has a normal mood and affect  Lab Results:   I have personally reviewed pertinent lab results  , CBC:   Lab Results   Component Value Date    WBC 16 54 (H) 07/21/2019    HGB 8 4 (L) 07/21/2019    HCT 29 9 (L) 07/21/2019    MCV 75 (L) 07/21/2019     (H) 07/21/2019    MCH 21 2 (L) 07/21/2019    MCHC 28 1 (L) 07/21/2019    RDW 19 2 (H) 07/21/2019    MPV 9 5 07/21/2019    NRBC 0 07/21/2019   , CMP:   Lab Results   Component Value Date    SODIUM 139 07/21/2019    K 4 3 07/21/2019     07/21/2019    CO2 28 07/21/2019    BUN 12 07/21/2019    CREATININE 0 54 (L) 07/21/2019    CALCIUM 9 0 07/21/2019    AST 16 07/21/2019    ALT 25 07/21/2019    ALKPHOS 172 (H) 07/21/2019    EGFR 95 07/21/2019     Imaging: I have personally reviewed pertinent reports  EKG, Pathology, and Other Studies: I have personally reviewed pertinent reports  Counseling / Coordination of Care  Total floor / unit time spent today 30 minutes  Greater than 50% of total time was spent with the patient and / or family counseling and / or coordination of care  A description of the counseling / coordination of care:  Obtaining history, performing physical exam, reviewing labs and imaging, discussing case with attending

## 2019-07-21 NOTE — ASSESSMENT & PLAN NOTE
Status post TAHBSO on 07/10, requiring intraoperative surgical concern for extensive lysis of adhesion complicated by serosal tear and mesenteric defect which was corrected  Biopsy revealed presence of endometrial carcinoma  · Patient is awaiting follow-up OP with gyn Oncology in August

## 2019-07-21 NOTE — ASSESSMENT & PLAN NOTE
Presented with Nausea vomiting and coffee-ground emesis  Denies melena, black tarry stool    BUN within normal limit  Presented to hemoglobin at 9 8 grams/deciliters, down to 7 4 grams/deciliter  Status post 1 PRBC on 07/21 with appropriate improvement  EGD showed large hiatal hernia with francisco ulcers  · GI following  · Changed protonix gtt to PO PPI BID  · Upper GI series done today, results pending  · Requires OP esophageal manometry and hernia repair surgery

## 2019-07-21 NOTE — ASSESSMENT & PLAN NOTE
Presented with nausea vomiting  Status post TAHBSO on 07/10, requiring intraoperative surgical concern for extensive lysis of adhesion complicated by serosal tear and mesenteric defect which was corrected  Noted to have leukocytosis  Obstructive series concerning for air-fluid level in hiatal hernia and possible small-bowel obstruction  CT of the abdomen-evidence of large hiatal hernia with evidence of volvulus  No evidence of strangulation  Small collection noted along with low transverse abdominal incision, likely seroma or hematoma  EGD showed a large hiatal hernia  · GI and surgery is following  · Cont PPI BID  · Advance diet as tolerated  · Will require OP surgical repair of hernia   Follow up surgery after DC

## 2019-07-21 NOTE — H&P
H&P- David Cruz 1945, 69 y o  female MRN: [id number]    Unit/Bed#: ED 10 Encounter: [id number]    Primary Care Provider: Jack Campbell MD   Date and time admitted to hospital: 7/21/2019  2:28 AM        * Upper GI bleed  Assessment & Plan  Patient presented to the ED with complaints of nausea and vomiting  She was nauseous all day yesterday yesterday and felt under the weather  Around 10PM patient started to vomit with coffee ground emesis  Last BM was around midnight and it was normal   Patient was just discharged from the hospital on 7/10 for an elective laparoscopy  Hgb at baseline, 9 8  · Admit to medicine  · Hgb q 8 hours, transfuse for acute drop  · NPO  · Protonix drip  · IVF, zofran PRN  · GI consultation    Hypertension  Assessment & Plan  Hold norvasc, continue atenolol    Depression  Assessment & Plan  Continue paxil     Asthma  Assessment & Plan  Albuterol PRN    Hyperlipidemia  Assessment & Plan  Continue statin    Asymptomatic stenosis of left carotid artery  Assessment & Plan  Hold aspirin given acute GI bleed  Continue statin    Benign essential hypertension  Assessment & Plan  Continue atenolol, hold norvasc    Anemia  Assessment & Plan  Acute on chronic secondary to acute blood loss    VTE Prophylaxis: Pharmacologic VTE Prophylaxis contraindicated due to GI bleed  / sequential compression device   Code Status: Prior    Anticipated Length of Stay:  Patient will be admitted on an Observation basis with an anticipated length of stay of less than 2 midnights  Justification for Hospital Stay: GI bleed    Total Time for Visit, including Counseling / Coordination of Care: 20 minutes  Greater than 50% of this total time spent on direct patient counseling and coordination of care  Chief Complaint:   Vomiting Blood (pt reports multiple episodes of vomiting since midnight, brown liquid emesis  hysterectomy done 7/10 adhesions found on intestine  skin cool clammy pale   denies chest pain or sob  denies black or bloody stools + loose stools)      History of Present Illness:    Lashon Sanchez is a 69 y o  female with a PMH of hypertension, hyperlipidemia, chronic anemia, stenosis of left carotid artery, asthma, hysterectomy 710 who presents with coffee-ground emesis  Patient states that since she had her surgery she has been feeling intermittently nauseous  Yesterday she felt nauseous and underwent the weather for most of the day  Around 10:00 p m  She started to vomit  She had multiple episodes of coffee-ground emesis  She has no abdominal pain  She denies fevers, chills, diarrhea, lightheadedness  Hemoglobin was stable in the ER  Obstructive series is pending  Patient is admitted for further management  Review of Systems:    Review of Systems   Constitutional: Negative  HENT: Negative  Eyes: Negative  Respiratory: Negative  Cardiovascular: Negative  Gastrointestinal: Positive for nausea and vomiting  Negative for diarrhea  Endocrine: Negative  Genitourinary: Negative  Musculoskeletal: Negative  Skin: Negative  Allergic/Immunologic: Negative  Neurological: Negative  Hematological: Negative  Psychiatric/Behavioral: Negative  Past Medical and Surgical History:     Past Medical History:   Diagnosis Date    Abnormal blood chemistry     abnormal biochemistry findings    Abnormal findings on diagnostic imaging of urinary organs     resolved 07/01/2016    Abnormal glucose     resolved 07/01/2016    Abnormal thyroid exam     resolved 07/01/2016    Abnormal thyroid screen (blood)     resolved 07/01/2016    Abnormal TSH     last assessed 03/07/2016    Allergic     Allergic rhinitis     last assessed 06/25/2015    Anemia     Arthritis     Asthma     Closed Colles' fracture     right wrist, last assessed 01/17/2014    Coronary artery disease     "blockages"  in left carotid artery  - not in the heart(per pt)            sees Dr Saba Shine Pulse  Depression     with insomnia    Diverticulosis     occ diverticulitis    Edema     last assessed 03/11/2015    Environmental allergies     GERD (gastroesophageal reflux disease)     Hematuria     last assessed 11/07/2013    Hiatal hernia     History of transfusion     had 2 units-11/16    Hyperlipidemia     Hypertension     Iron deficiency anemia     chronic-receives iron infusion usually every 6 months    Ischemic colitis ([facility] [address] )     last assessed 12/07/2016    Knee mass     last assessed 06/11/2014    Orthostatic hypotension     last assessed 03/26/2014    Osteoarthrosis of knee     last assessed 11/12/2014    Palpitations     PMB (postmenopausal bleeding)     last assessed 11/07/2013    Polyarthralgia     last assessed 06/09/2014    Posthemorrhagic anemia     last assessed 11/07/2013       Past Surgical History:   Procedure Laterality Date    ABDOMINAL ADHESION SURGERY N/A 7/10/2019    Procedure: EXTENSIVE LYSIS ADHESIONS, OVER SEW SEROSAL TEAR, CLOSURE MESENTERIC DEFECT;  Surgeon: Missy Marc MD;  Location: [address] [place] [address];  Service: General    APPENDECTOMY      BREAST SURGERY Bilateral     exc  lashawn masses-benign    CATARACT EXTRACTION      CATARACT EXTRACTION W/ INTRAOCULAR LENS IMPLANT Right 2/6/2017    Procedure: EXTRACTION EXTRACAPSULAR CATARACT PHACO INTRAOCULAR LENS (IOL); Surgeon: Rose Nina MD;  Location: [facility] OR;  Service:     CATARACT EXTRACTION W/ INTRAOCULAR LENS IMPLANT Left 1/9/2017    Procedure: EXTRACTION EXTRACAPSULAR CATARACT PHACO INTRAOCULAR LENS (IOL); Surgeon: Rose Nina MD;  Location: [facility] OR;  Service:     CHOLECYSTECTOMY      lap    COLONOSCOPY N/A 12/3/2016    Procedure: COLONOSCOPY;  Surgeon: Ubaldo Gregorio MD;  Location: [facility] LAB;   Service:     CYSTOSCOPY  06/2011    bladder biopsy, lashawn  retrogrades    DILATION AND CURETTAGE OF UTERUS      x2    ESOPHAGOGASTRODUODENOSCOPY N/A 12/2/2016    Procedure: ESOPHAGOGASTRODUODENOSCOPY (EGD); Surgeon: Rocky Saleh MD;  Location: [facility] LAB; Service:     ESOPHAGOGASTRODUODENOSCOPY N/A 6/5/2017    Procedure: ESOPHAGOGASTRODUODENOSCOPY (EGD); Surgeon: David Delong MD;  Location: [facility]; Service:     FRACTURE SURGERY Right     distal radius repair w/hardware    HAND TENDON SURGERY Right     laceration repair    HERNIA REPAIR      umbilical    HYSTERECTOMY  07/10/2019    IR IMAGE GUIDED BIOPSY/ASPIRATION LIVER  12/12/2018    KNEE SURGERY Right 07/03/2014    mass removed    PELVIC LAPAROSCOPY Bilateral 7/10/2019    Procedure: SALPINGO-OOPHORECTOMY, LAPAROSCOPIC;  Surgeon: Alex Schrader MD;  Location: [address] [place] [address];  Service: Gynecology    AL LAP,VAG HYST,UTERUS 250GMS/<,SALP-OOPH N/A 7/10/2019    Procedure: HYSTERECTOMY LAPAROSCOPIC ASSISTED VAGINAL (LAVH); Surgeon: Alex Schrader MD;  Location: [address] [place] [address];  Service: Gynecology    TOTAL SHOULDER ARTHROPLASTY Right 7/14/2016    Procedure: ARTHROPLASTY SHOULDER with subacromial decompression, distal clavicle excision and possible rotator cuff repair,limited debridement of laboral tear;  Surgeon: Cierra Burdick MD;  Location: [facility] MAIN OR;  Service:     WRIST SURGERY Right 12/2013    repair fracture with hardware       Meds/Allergies:    Prior to Admission medications    Medication Sig Start Date End Date Taking? Authorizing Provider   amLODIPine (NORVASC) 2 5 mg tablet Take 1 tablet (2 5 mg total) by mouth daily 7/1/19  Yes Angelica Snellen, MD   aspirin 81 MG tablet Take 81 mg by mouth daily at bedtime Last dose6/26/19   Yes Historical Provider, MD   atenolol (TENORMIN) 25 mg tablet Take 1 tablet (25 mg total) by mouth daily at bedtime 9/4/18  Yes Angelica Snellen, MD   atorvastatin (LIPITOR) 10 mg tablet Take 1 tablet (10 mg total) by mouth daily at bedtime 1/7/19  Yes Angelica Snellen, MD   Calcium-Vitamin D (CALTRATE 600 PLUS-VIT D PO) Take by mouth daily at bedtime       Yes Historical Provider, MD   Fexofenadine HCl (ALLEGRA PO) Take 10 mg by mouth as needed  Yes Historical Provider, MD   pantoprazole (PROTONIX) 40 mg tablet Take 1 tablet (40 mg total) by mouth 2 (two) times a day 4/15/19  Yes Jevon Krishnan PA-C   PARoxetine (PAXIL) 20 mg tablet Take 1 tablet (20 mg total) by mouth daily  Patient taking differently: Take 20 mg by mouth daily at bedtime  1/7/19  Yes Ranjith Narvaez MD   VENTOLIN  (62 Base) MCG/ACT inhaler Inhale 2 puffs every 6 (six) hours as needed for wheezing 6/18/18  Yes Ranjith Narvaez MD   zolpidem (AMBIEN) 5 mg tablet Take 1 tablet (5 mg total) by mouth daily at bedtime as needed for sleep 6/17/19  Yes Ranjith Narvaez MD       Allergies: Allergies   Allergen Reactions    Shellfish-Derived Products Anaphylaxis     seafood    Sulfa Antibiotics Anaphylaxis       Social History:     Marital Status:    Substance Use History:   Social History     Substance and Sexual Activity   Alcohol Use Not Currently    Frequency: Never    Drinks per session: Patient refused    Binge frequency: Never     Social History     Tobacco Use   Smoking Status Never Smoker   Smokeless Tobacco Never Used     Social History     Substance and Sexual Activity   Drug Use No       Family History:    Family History   Problem Relation Age of Onset    Cancer Father [de-identified]        colon     Hypertension Father     Cancer Paternal Grandmother         breast    Stroke Mother         TIA       Physical Exam:     Vitals:   Blood Pressure: 149/69 (07/21/19 0422)  Pulse: 74 (07/21/19 0422)  Temperature: 97 9 °F (36 6 °C) (07/21/19 0422)  Temp Source: Oral (07/21/19 0422)  Respirations: 12 (07/21/19 0422)  Height: 5' 7" (170 2 cm) (07/21/19 0220)  Weight - Scale: 83 9 kg (185 lb) (07/21/19 0220)  SpO2: 97 % (07/21/19 0422)    Physical Exam   Constitutional: She is oriented to person, place, and time  She appears well-developed and well-nourished  HENT:   Head: Normocephalic and atraumatic     Eyes: Pupils are equal, round, and reactive to light  EOM are normal    Neck: Normal range of motion  Neck supple  Cardiovascular: Normal rate, regular rhythm and normal heart sounds  Pulmonary/Chest: Effort normal and breath sounds normal    Abdominal: Soft  Bowel sounds are normal    Slightly tender to palpation over lower abdomen/pelvic region; recent surgery   Musculoskeletal: Normal range of motion  She exhibits no edema  Neurological: She is alert and oriented to person, place, and time  Skin: Skin is warm and dry  There is pallor  Psychiatric: She has a normal mood and affect  Her behavior is normal    Nursing note and vitals reviewed  Additional Data:     Lab Results: I have personally reviewed pertinent reports  Results from last 7 days   Lab Units 07/21/19 0229   WBC Thousand/uL 13 56*   HEMOGLOBIN g/dL 9 8*   HEMATOCRIT % 34 5*   PLATELETS Thousands/uL 640*   NEUTROS PCT % 82*     Results from last 7 days   Lab Units 07/21/19 0229   SODIUM mmol/L 138   POTASSIUM mmol/L 3 5   CHLORIDE mmol/L 98*   CO2 mmol/L 27   BUN mg/dL 13   CREATININE mg/dL 0 83   CALCIUM mg/dL 9 0   TOTAL BILIRUBIN mg/dL 0 60   ALK PHOS U/L 172*   ALT U/L 25   AST U/L 16                       Imaging: I have personally reviewed pertinent reports  XR abdomen obstruction series    (Results Pending)       XR abdomen obstruction series    (Results Pending)       EKG, Pathology, and Other Studies Reviewed on Admission:   · EKG: not applicable    Allscripts / [facility] Records Reviewed: Yes     ** Please Note: This note has been constructed using a voice recognition system   **

## 2019-07-21 NOTE — ASSESSMENT & PLAN NOTE
Acute on chronic secondary to acute blood loss  Hg dropped to 7 4   S/p 1 unit PRBC  · Monitor H&H, transfuse as needed

## 2019-07-21 NOTE — PLAN OF CARE
Problem: CARDIOVASCULAR - ADULT  Goal: Maintains optimal cardiac output and hemodynamic stability  Description  INTERVENTIONS:  - Monitor I/O, vital signs and rhythm  - Monitor for S/S and trends of decreased cardiac output i e  bleeding, hypotension  - Administer and titrate ordered vasoactive medications to optimize hemodynamic stability  - Assess quality of pulses, skin color and temperature  - Assess for signs of decreased coronary artery perfusion - ex   Angina  - Instruct patient to report change in severity of symptoms  Outcome: Progressing     Problem: GASTROINTESTINAL - ADULT  Goal: Minimal or absence of nausea and/or vomiting  Description  INTERVENTIONS:  - Administer IV fluids as ordered to ensure adequate hydration  - Maintain NPO status until nausea and vomiting are resolved  - Nasogastric tube as ordered  - Administer ordered antiemetic medications as needed  - Provide nonpharmacologic comfort measures as appropriate  - Advance diet as tolerated, if ordered  - Nutrition services referral to assist patient with adequate nutrition and appropriate food choices  Outcome: Progressing  Goal: Maintains adequate nutritional intake  Description  INTERVENTIONS:  - Monitor percentage of each meal consumed  - Identify factors contributing to decreased intake, treat as appropriate  - Assist with meals as needed  - Monitor I&O, WT and lab values  - Obtain nutrition services referral as needed  Outcome: Progressing

## 2019-07-21 NOTE — UTILIZATION REVIEW
Initial Clinical Review    Admission: Date/Time/Statement: OBS 7/21 0358 converted to IP for  continued treatment of UGI bleed     Admitting Physician JOCELYNN [facility]    Level of Care Med Surg    Estimated length of stay More than 2 Midnights    Certification I certify that inpatient services are medically necessary for this patient for a duration of greater than two midnights  See H&P and MD Progress Notes for additional information about the patient's course of treatment  ED Arrival Information     Expected Arrival Acuity Means of Arrival Escorted By Service Admission Type    - 7/21/2019 02:16 Urgent Walk-In Self General Medicine Urgent    Arrival Complaint    -        Chief Complaint   Patient presents with    Vomiting Blood     pt reports multiple episodes of vomiting since midnight, brown liquid emesis  hysterectomy done 7/10 adhesions found on intestine  skin cool clammy pale  denies chest pain or sob  denies black or bloody stools + loose stools     Assessment/Plan:  69 y o  female with a PMH of hypertension, hyperlipidemia, chronic anemia, stenosis of left carotid artery, asthma, hysterectomy 710 who presents with coffee-ground emesis  Patient states that since she had her surgery she has been feeling intermittently nauseous  Yesterday she felt nauseous and underwent the weather for most of the day  Around 10:00 p m  She started to vomit  She had multiple episodes of coffee-ground emesis  * Upper GI bleed  Assessment & Plan  Patient presented to the ED with complaints of nausea and vomiting  She was nauseous all day yesterday yesterday and felt under the weather  Around 10PM patient started to vomit with coffee ground emesis  Last BM was around midnight and it was normal   Patient was just discharged from the hospital on 7/10 for an elective laparoscopy  Hgb at baseline, 9 8      · Admit to medicine  · Hgb q 8 hours, transfuse for acute drop  · NPO  · Protonix drip  · IVF, zofran PRN  · GI consultation     Hypertension  Assessment & Plan  Hold norvasc, continue atenolol     Depression  Assessment & Plan  Continue paxil      Asthma  Assessment & Plan  Albuterol PRN     Hyperlipidemia  Assessment & Plan  Continue statin     Asymptomatic stenosis of left carotid artery  Assessment & Plan  Hold aspirin given acute GI bleed  Continue statin     Benign essential hypertension  Assessment & Plan  Continue atenolol, hold norvasc     Anemia  Assessment & Plan  Acute on chronic secondary to acute blood loss     VTE Prophylaxis: Pharmacologic VTE Prophylaxis contraindicated due to GI bleed  / sequential compression device   Code Status: Prior     Anticipated Length of Stay:  Patient will be admitted on an Observation basis with an anticipated length of stay of less than 2 midnights  Justification for Hospital Stay: GI bleed     ED Triage Vitals [07/21/19 0220]   Temperature Pulse Respirations Blood Pressure SpO2   98 4 °F (36 9 °C) 82 14 153/70 96 %      Temp Source Heart Rate Source Patient Position - Orthostatic VS BP Location FiO2 (%)   Tympanic Monitor Lying Right arm --      Pain Score       No Pain        Wt Readings from Last 1 Encounters:   07/21/19 83 9 kg (185 lb)     Additional Vital Signs:   07/21/19 0809  98 1 °F (36 7 °C)    16  137/67  98 %  Nasal cannula  Lying   07/21/19 0422  97 9 °F (36 6 °C)  74  12  149/69  97 %  Nasal cannula  Lying       Pertinent Labs/Diagnostic Test Results:   7/21 OBS series  Large hiatal hernia with an air-fluid level  2   Scattered mildly dilated small bowel loops with air-fluid levels    Recommend CT scan of the abdomen and pelvis to exclude obstruction  Results from last 7 days   Lab Units 07/21/19  0619 07/21/19 0229   WBC Thousand/uL 16 54* 13 56*   HEMOGLOBIN g/dL 8 4* 9 8*   HEMATOCRIT % 29 9* 34 5*   PLATELETS Thousands/uL 505* 640*   NEUTROS ABS Thousands/µL  --  11 18*     Results from last 7 days   Lab Units 07/21/19  0619 07/21/19 0229   SODIUM mmol/L 139 138   POTASSIUM mmol/L 4 3 3 5   CHLORIDE mmol/L 104 98*   CO2 mmol/L 28 27   ANION GAP mmol/L 7 13   BUN mg/dL 12 13   CREATININE mg/dL 0 54* 0 83   EGFR ml/min/1 73sq m 95 71   CALCIUM mg/dL 9 0 9 0     Results from last 7 days   Lab Units 07/21/19  0229   AST U/L 16   ALT U/L 25   ALK PHOS U/L 172*   TOTAL PROTEIN g/dL 7 3   ALBUMIN g/dL 3 4*   TOTAL BILIRUBIN mg/dL 0 60     Results from last 7 days   Lab Units 07/21/19  0619 07/21/19  0229   GLUCOSE RANDOM mg/dL 113 166*     Results from last 7 days   Lab Units 07/21/19  0229   LIPASE u/L 128     ED Treatment:   Medication Administration from 07/21/2019 0215 to 07/21/2019 0448       Date/Time Order Dose Route Action     07/21/2019 0230 ondansetron (ZOFRAN) injection 4 mg 4 mg Intravenous Given     07/21/2019 0228 sodium chloride 0 9 % infusion 1,000 mL/hr Intravenous New Bag     07/21/2019 0422 ondansetron (ZOFRAN) injection 4 mg 4 mg Intravenous Given        Past Medical History:   Diagnosis Date    Abnormal blood chemistry     abnormal biochemistry findings    Abnormal findings on diagnostic imaging of urinary organs     resolved 07/01/2016    Abnormal glucose     resolved 07/01/2016    Abnormal thyroid exam     resolved 07/01/2016    Abnormal thyroid screen (blood)     resolved 07/01/2016    Abnormal TSH     last assessed 03/07/2016    Allergic     Allergic rhinitis     last assessed 06/25/2015    Anemia     Arthritis     Asthma     Closed Colles' fracture     right wrist, last assessed 01/17/2014    Coronary artery disease     "blockages"  in left carotid artery  - not in the heart(per pt)            sees Dr Bhavesh moser   Iver Lei Depression     with insomnia    Diverticulosis     occ diverticulitis    Edema     last assessed 03/11/2015    Environmental allergies     GERD (gastroesophageal reflux disease)     Hematuria     last assessed 11/07/2013    Hiatal hernia     History of transfusion     had 2 units-11/16    Hyperlipidemia     Hypertension     Iron deficiency anemia     chronic-receives iron infusion usually every 6 months    Ischemic colitis ([facility] [address] )     last assessed 12/07/2016    Knee mass     last assessed 06/11/2014    Orthostatic hypotension     last assessed 03/26/2014    Osteoarthrosis of knee     last assessed 11/12/2014    Palpitations     PMB (postmenopausal bleeding)     last assessed 11/07/2013    Polyarthralgia     last assessed 06/09/2014    Posthemorrhagic anemia     last assessed 11/07/2013     Present on Admission:   Anemia   Asthma   Asymptomatic stenosis of left carotid artery   Benign essential hypertension   Depression   Hyperlipidemia   Hypertension      Admitting Diagnosis: Vomiting [R11 10]  GI bleed [K92 2]  Coffee ground emesis [K92 0]  Age/Sex: 69 y o  female  Admission Orders:    Current Facility-Administered Medications:  acetaminophen 650 mg Oral Q6H PRN     [START ON 7/22/2019] amLODIPine 2 5 mg Oral Daily     atenolol 25 mg Oral HS     atorvastatin 10 mg Oral HS     dextrose 5 % and sodium chloride 0 45 % 75 mL/hr Intravenous Continuous     ondansetron 4 mg Intravenous Q6H PRN     pantoprozole (PROTONIX) infusion (Continuous) 8 mg/hr Intravenous Continuous     PARoxetine 20 mg Oral HS       SCD  Bedrest   NPO   IP CONSULT TO GASTROENTEROLOGY  IP CONSULT TO [address] [place] [address] Utilization Review Department  Phone: [phone]; Fax [phone]  [email or web address]  ATTENTION: Please call with any questions or concerns to [phone]  and carefully listen to the prompts so that you are directed to the right person  Send all requests for admission clinical reviews, approved or denied determinations and any other requests to fax [phone]   All voicemails are confidential

## 2019-07-21 NOTE — LETTER
July 22, 2019     Patient: Art Monroe   YOB: 1945   Date of Visit: 7/21/2019       To Whom it May Concern:    Art Monroe is under my professional care  She was seen in hospital 7/21/2019  She   If you have any questions or concerns, please don't hesitate to call  Sincerely,      Reginald Bustos PA-C    No name on file          CC: No Recipients

## 2019-07-21 NOTE — LETTER
[address] [facility] [address]  [place] [address]  Dept: [phone]    July 21, 2019     Patient: Cynthia Ambrose   YOB: 1945   Date of Visit: 7/21/2019       To Whom it May Concern:    Cynthia Ambrose is currently admitted to the hospital on 7/21/2019  If you have any questions or concerns, please don't hesitate to call           Sincerely,          Alex Mcallister MD

## 2019-07-21 NOTE — PHYSICIAN ADVISOR
Current patient class: Inpatient  The patient is currently on Hospital Day: 1 at [address] [place] [address]      The patient was admitted to the hospital at [phone] on 7/21/19 for the following diagnosis:  Vomiting [R11 10]  GI bleed [K92 2]  Coffee ground emesis [K92 0]       There is documentation in the medical record of an expected length of stay of at least 2 midnights  The patient is therefore expected to satisfy the 2 midnight benchmark and given the 2 midnight presumption is appropriate for INPATIENT ADMISSION  Given this expectation of a satisfying stay, CMS instructs us that the patient is most often appropriate for inpatient admission under part A provided medical necessity is documented in the chart  After review of the relevant documentation, labs, vital signs and test results, the patient is appropriate for INPATIENT ADMISSION  Admission to the hospital as an inpatient is a complex decision making process which requires the practitioner to consider the patients presenting complaint, history and physical examination and all relevant testing  With this in mind, in this case, the patient was deemed appropriate for INPATIENT ADMISSION  After review of the documentation and testing available at the time of the admission I concur with this clinical determination of medical necessity  Rationale is as follows: The patient is a 69 yrs old Female who presented to the ED at 7/21/2019  2:28 AM with a chief complaint of Vomiting Blood (pt reports multiple episodes of vomiting since midnight, brown liquid emesis  hysterectomy done 7/10 adhesions found on intestine  skin cool clammy pale  denies chest pain or sob  denies black or bloody stools + loose stools)     Patient admitted with a report of multiple episodes of vomiting brown liquid  She has a history of HTN, anemia and asthma  She recently has a laparascopic hysterectomy with B/L salpingo-oopherectomy    Abnormal labs include a hemoglobin which has dropped from 9 8 to 8 4  Imaging studies revealed dilated small bowel loops with air fluid levels causing concern for a possible SBO  She was evaluated by Surgery who recommended a Protonix drip  keeping her NPO and ordering a CT of the A/P to rule out an obstruction  She was also evaluated by GI who agreed with NPO and the Protonix drip  They also recommended an EGD if the CT did not reveal a SBO  Ongoing acute care is noted for this patient and a two night admission class to the hospital would be considered appropriate  The patients vitals on arrival were ED Triage Vitals [07/21/19 0220]   Temperature Pulse Respirations Blood Pressure SpO2   98 4 °F (36 9 °C) 82 14 153/70 96 %      Temp Source Heart Rate Source Patient Position - Orthostatic VS BP Location FiO2 (%)   Tympanic Monitor Lying Right arm --      Pain Score       No Pain           Past Medical History:   Diagnosis Date    Abnormal blood chemistry     abnormal biochemistry findings    Abnormal findings on diagnostic imaging of urinary organs     resolved 07/01/2016    Abnormal glucose     resolved 07/01/2016    Abnormal thyroid exam     resolved 07/01/2016    Abnormal thyroid screen (blood)     resolved 07/01/2016    Abnormal TSH     last assessed 03/07/2016    Allergic     Allergic rhinitis     last assessed 06/25/2015    Anemia     Arthritis     Asthma     Closed Colles' fracture     right wrist, last assessed 01/17/2014    Coronary artery disease     "blockages"  in left carotid artery  - not in the heart(per pt)            sees Dr Alix Palacios Depression     with insomnia    Diverticulosis     occ diverticulitis    Edema     last assessed 03/11/2015    Environmental allergies     GERD (gastroesophageal reflux disease)     Hematuria     last assessed 11/07/2013    Hiatal hernia     History of transfusion     had 2 units-11/16    Hyperlipidemia     Hypertension     Iron deficiency anemia chronic-receives iron infusion usually every 6 months    Ischemic colitis ([facility] [address] )     last assessed 12/07/2016    Knee mass     last assessed 06/11/2014    Orthostatic hypotension     last assessed 03/26/2014    Osteoarthrosis of knee     last assessed 11/12/2014    Palpitations     PMB (postmenopausal bleeding)     last assessed 11/07/2013    Polyarthralgia     last assessed 06/09/2014    Posthemorrhagic anemia     last assessed 11/07/2013     Past Surgical History:   Procedure Laterality Date    ABDOMINAL ADHESION SURGERY N/A 7/10/2019    Procedure: EXTENSIVE LYSIS ADHESIONS, OVER SEW SEROSAL TEAR, CLOSURE MESENTERIC DEFECT;  Surgeon: Finn Kenyon MD;  Location: [address] [place] [address];  Service: General    APPENDECTOMY      BREAST SURGERY Bilateral     exc  lashawn masses-benign    CATARACT EXTRACTION      CATARACT EXTRACTION W/ INTRAOCULAR LENS IMPLANT Right 2/6/2017    Procedure: EXTRACTION EXTRACAPSULAR CATARACT PHACO INTRAOCULAR LENS (IOL); Surgeon: Israel King MD;  Location: [facility] MAIN OR;  Service:     CATARACT EXTRACTION W/ INTRAOCULAR LENS IMPLANT Left 1/9/2017    Procedure: EXTRACTION EXTRACAPSULAR CATARACT PHACO INTRAOCULAR LENS (IOL); Surgeon: Israel King MD;  Location: [facility] MAIN OR;  Service:     CHOLECYSTECTOMY      lap    COLONOSCOPY N/A 12/3/2016    Procedure: COLONOSCOPY;  Surgeon: Yao Cottrell MD;  [facility]; Service:     CYSTOSCOPY  06/2011    bladder biopsy, lashawn  retrogrades    DILATION AND CURETTAGE OF UTERUS      x2    ESOPHAGOGASTRODUODENOSCOPY N/A 12/2/2016    Procedure: ESOPHAGOGASTRODUODENOSCOPY (EGD); Surgeon: Remy Wesley MD;  Location: [facility] GI LAB; Service:     ESOPHAGOGASTRODUODENOSCOPY N/A 6/5/2017    Procedure: ESOPHAGOGASTRODUODENOSCOPY (EGD); Surgeon: Yao Cottrell MD;  Location: [facility] GI LAB;   Service:     FRACTURE SURGERY Right     distal radius repair w/hardware    HAND TENDON SURGERY Right     laceration repair    HERNIA REPAIR umbilical    HYSTERECTOMY  07/10/2019    IR IMAGE GUIDED BIOPSY/ASPIRATION LIVER  12/12/2018    KNEE SURGERY Right 07/03/2014    mass removed    PELVIC LAPAROSCOPY Bilateral 7/10/2019    Procedure: SALPINGO-OOPHORECTOMY, LAPAROSCOPIC;  Surgeon: Richard Ng MD;  Location: [address] [place] [address];  Service: Gynecology    PA LAP,VAG HYST,UTERUS 250GMS/<,SALP-OOPH N/A 7/10/2019    Procedure: HYSTERECTOMY LAPAROSCOPIC ASSISTED VAGINAL (LAVH);   Surgeon: Richard Ng MD;  Location: [address] [place] [address];  Service: Gynecology    TOTAL SHOULDER ARTHROPLASTY Right 7/14/2016    Procedure: ARTHROPLASTY SHOULDER with subacromial decompression, distal clavicle excision and possible rotator cuff repair,limited debridement of laboral tear;  Surgeon: David Raya MD;  Location: [facility] MAIN OR;  Service:     WRIST SURGERY Right 12/2013    repair fracture with hardware           Consults have been placed to:   IP CONSULT TO GASTROENTEROLOGY  IP CONSULT TO ACUTE CARE SURGERY    Vitals:    07/21/19 0220 07/21/19 0422 07/21/19 0809   BP: 153/70 149/69 137/67   BP Location: Right arm Right arm Left arm   Pulse: 82 74    Resp: 14 12 16   Temp: 98 4 °F (36 9 °C) 97 9 °F (36 6 °C) 98 1 °F (36 7 °C)   TempSrc: Tympanic Oral Oral   SpO2: 96% 97% 98%   Weight: 83 9 kg (185 lb)     Height: 5' 7" (1 702 m)         Most recent labs:    Recent Labs     07/21/19  0229 07/21/19  0619   WBC 13 56* 16 54*   HGB 9 8* 8 4*   HCT 34 5* 29 9*   * 505*   K 3 5 4 3   CALCIUM 9 0 9 0   BUN 13 12   CREATININE 0 83 0 54*   LIPASE 128  --    AST 16  --    ALT 25  --    ALKPHOS 172*  --        Scheduled Meds:  Current Facility-Administered Medications:  acetaminophen 650 mg Oral Q6H PRN Alex Mcallister MD    atenolol 25 mg Oral HS Tiffanie Nathaly, CRNP    atorvastatin 10 mg Oral HS Tiffanie MANA Andersen    dextrose 5 % and sodium chloride 0 45 % 75 mL/hr Intravenous Continuous Alex Mcallister MD Last Rate: 75 mL/hr (07/21/19 1059)   ondansetron 4 mg Intravenous Q6H PRN Verdie Gilford, CRNP    pantoprozole (PROTONIX) infusion (Continuous) 8 mg/hr Intravenous Continuous Verdie Gilford, CRNP Last Rate: 8 mg/hr (07/21/19 0749)   PARoxetine 20 mg Oral HS Verdie Gilford, CRNP      Continuous Infusions:  dextrose 5 % and sodium chloride 0 45 % 75 mL/hr Last Rate: 75 mL/hr (07/21/19 1059)   pantoprozole (PROTONIX) infusion (Continuous) 8 mg/hr Last Rate: 8 mg/hr (07/21/19 0749)     PRN Meds:   acetaminophen    ondansetron    Surgical procedures (if appropriate):

## 2019-07-21 NOTE — ED NOTES
Patient is resting comfortably  Placed on 2L o2 nasal cannula  Daughter at bedside       Garnetta Aase, RN  07/21/19 5290

## 2019-07-21 NOTE — CONSULTS
Consultation - [facility]  Adriana Chaudhari 69 y o  female MRN: [id number]  Unit/Bed#: [address] [facility], [facility]  Encounter: [id number]        Consults    ASSESSMENT/PLAN:      1  Coffee-ground emesis/nausea-suspect may have had some partial small-bowel obstruction, clinically not obstructed at this time, she is passing gas and had a bowel movement  I do suspect that his coffee-ground emesis may be secondary to [facility] ulcers/erosions or peptic ulcer disease  Fortunately, her hemoglobin is fairly stable  Her BUN is normal   -would recommend to continue Protonix drip for now   -monitor H&H   -avoid the use of NSAIDs  -follow-up CT of abdomen and pelvis results   -if there is no evidence of obstruction, will proceed with EGD tomorrow to assess for source of GI bleed  -NPO for now   -continue IV fluid resuscitation, antiemetics as necessary  -plan discussed with primary team   ______________________________________________________________________    Reason for Consult / Principal Problem: Upper GI bleed    HPI: Adriana Chaudhari is a 69y o  year old female with history of hypertension, hyperlipidemia, asymptomatic stenosis of the left carotid artery, asthma, depression, presents with acute onset of nausea, vomiting and coffee-ground emesis  Patient recently underwent hysterectomy 2 weeks ago and has since been feeling nauseous on and off  She states that she felt generally unwell yesterday  She denies melena  She did have 1 bowel movement this morning which she states was brown in color  She states that she has bowel movements every 2-3 days on normal basis  She states that since having had the surgery, her bowel movements are fairly the same, denies any significant constipation  Denies NSAID use  Denies any other sick contacts or recent travel  Vitals on admission were stable  She is not tachycardic/ hypotensive    Labs are notable for hemoglobin of 7 8 on admission, was 8 4 after hysterectomy, platelets 640, WBC of 13 5, BUN was normal at 13  Hemoglobin this morning is 8 4  BUN continues to be normal at 12  Obstruction series on admission were notable for large hiatal hernia with air-fluid level  There was also mild dilation of the small bowel loops with air-fluid level concerning for possible partial small bowel obstruction, CT was recommended  Patient had just undergone CT scan at the time of this consult  The results are still pending  Patient reports that she feels better  Denies any further episodes of nausea, vomiting or coffee-ground emesis  She states that she had a bowel movement this morning which was brown in color  She has undergone EGD and colonoscopy in the past by Dr Bebo Pelletier, with EGDs showing evidence of gastric ulcers and Donn ulcers  She also has history of Schatzki's ring with dilation last year  Patient states that she does take a PPI on daily basis  She also underwent colonoscopy in 2016 which was notable for ulceration of the splenic/descending colon-likely thought to be secondary to ischemia  Review of Systems: The remainder of the review of systems was negative except for the pertinent positives noted in HPI  Historical Information   Past Medical History:   Diagnosis Date    Abnormal blood chemistry     abnormal biochemistry findings    Abnormal findings on diagnostic imaging of urinary organs     resolved 07/01/2016    Abnormal glucose     resolved 07/01/2016    Abnormal thyroid exam     resolved 07/01/2016    Abnormal thyroid screen (blood)     resolved 07/01/2016    Abnormal TSH     last assessed 03/07/2016    Allergic     Allergic rhinitis     last assessed 06/25/2015    Anemia     Arthritis     Asthma     Closed Colles' fracture     right wrist, last assessed 01/17/2014    Coronary artery disease     "blockages"  in left carotid artery  - not in the heart(per pt)            sees Dr Jono Grady Depression     with insomnia    Diverticulosis     occ diverticulitis    Edema     last assessed 03/11/2015    Environmental allergies     GERD (gastroesophageal reflux disease)     Hematuria     last assessed 11/07/2013    Hiatal hernia     History of transfusion     had 2 units-11/16    Hyperlipidemia     Hypertension     Iron deficiency anemia     chronic-receives iron infusion usually every 6 months    Ischemic colitis ([facility] [address] )     last assessed 12/07/2016    Knee mass     last assessed 06/11/2014    Orthostatic hypotension     last assessed 03/26/2014    Osteoarthrosis of knee     last assessed 11/12/2014    Palpitations     PMB (postmenopausal bleeding)     last assessed 11/07/2013    Polyarthralgia     last assessed 06/09/2014    Posthemorrhagic anemia     last assessed 11/07/2013     Past Surgical History:   Procedure Laterality Date    ABDOMINAL ADHESION SURGERY N/A 7/10/2019    Procedure: EXTENSIVE LYSIS ADHESIONS, OVER SEW SEROSAL TEAR, CLOSURE MESENTERIC DEFECT;  Surgeon: Ravi Gonzalez MD;  Location: [address] [place] [address];  Service: General    APPENDECTOMY      BREAST SURGERY Bilateral     exc  lashawn masses-benign    CATARACT EXTRACTION      CATARACT EXTRACTION W/ INTRAOCULAR LENS IMPLANT Right 2/6/2017    Procedure: EXTRACTION EXTRACAPSULAR CATARACT PHACO INTRAOCULAR LENS (IOL); Surgeon: Ita Meléndez MD;  Location: [facility] MAIN OR;  Service:     CATARACT EXTRACTION W/ INTRAOCULAR LENS IMPLANT Left 1/9/2017    Procedure: EXTRACTION EXTRACAPSULAR CATARACT PHACO INTRAOCULAR LENS (IOL); Surgeon: Ita Meléndez MD;  Location: [facility] OR;  Service:     CHOLECYSTECTOMY      lap    COLONOSCOPY N/A 12/3/2016    Procedure: COLONOSCOPY;  Surgeon: Braden Ventura MD;  Location: [facility] GI LAB; Service:     CYSTOSCOPY  06/2011    bladder biopsy, lashawn  retrogrades    DILATION AND CURETTAGE OF UTERUS      x2    ESOPHAGOGASTRODUODENOSCOPY N/A 12/2/2016    Procedure: ESOPHAGOGASTRODUODENOSCOPY (EGD);   Surgeon: Joshua Batista MD;  Location: [facility] GI LAB; Service:     ESOPHAGOGASTRODUODENOSCOPY N/A 6/5/2017    Procedure: ESOPHAGOGASTRODUODENOSCOPY (EGD); Surgeon: Yao Cottrell MD;  Location: [facility] GI LAB; Service:     FRACTURE SURGERY Right     distal radius repair w/hardware    HAND TENDON SURGERY Right     laceration repair    HERNIA REPAIR      umbilical    HYSTERECTOMY  07/10/2019    IR IMAGE GUIDED BIOPSY/ASPIRATION LIVER  12/12/2018    KNEE SURGERY Right 07/03/2014    mass removed    PELVIC LAPAROSCOPY Bilateral 7/10/2019    Procedure: SALPINGO-OOPHORECTOMY, LAPAROSCOPIC;  Surgeon: Zenaida García MD;  Location: [address] [place] [address];  Service: Gynecology    [place] LAP,VAG HYST,UTERUS 250GMS/<,SALP-OOPH N/A 7/10/2019    Procedure: HYSTERECTOMY LAPAROSCOPIC ASSISTED VAGINAL (LAVH);   Surgeon: Zenaida García MD;  Location: [address] [place] [address];  Service: Gynecology    TOTAL SHOULDER ARTHROPLASTY Right 7/14/2016    Procedure: ARTHROPLASTY SHOULDER with subacromial decompression, distal clavicle excision and possible rotator cuff repair,limited debridement of laboral tear;  Surgeon: Jasvir Spangler MD;  Location: [facility] MAIN OR;  Service:     WRIST SURGERY Right 12/2013    repair fracture with hardware     Social History   Social History     Substance and Sexual Activity   Alcohol Use Not Currently    Frequency: Never    Drinks per session: Patient refused    Binge frequency: Never     Social History     Substance and Sexual Activity   Drug Use No     Social History     Tobacco Use   Smoking Status Never Smoker   Smokeless Tobacco Never Used     Family History   Problem Relation Age of Onset    Cancer Father [de-identified]        colon     Hypertension Father     Cancer Paternal Grandmother         breast    Stroke Mother         TIA       Meds/Allergies     Medications Prior to Admission   Medication    amLODIPine (NORVASC) 2 5 mg tablet    aspirin 81 MG tablet    atenolol (TENORMIN) 25 mg tablet    atorvastatin (LIPITOR) 10 mg tablet    Calcium-Vitamin D (CALTRATE 600 PLUS-VIT D PO)    Fexofenadine HCl (ALLEGRA PO)    pantoprazole (PROTONIX) 40 mg tablet    PARoxetine (PAXIL) 20 mg tablet    VENTOLIN  (90 Base) MCG/ACT inhaler    zolpidem (AMBIEN) 5 mg tablet     Current Facility-Administered Medications   Medication Dose Route Frequency    acetaminophen (TYLENOL) tablet 650 mg  650 mg Oral Q6H PRN    [START ON 7/22/2019] amLODIPine (NORVASC) tablet 2 5 mg  2 5 mg Oral Daily    atenolol (TENORMIN) tablet 25 mg  25 mg Oral HS    atorvastatin (LIPITOR) tablet 10 mg  10 mg Oral HS    dextrose 5 % and sodium chloride 0 45 % infusion  75 mL/hr Intravenous Continuous    ondansetron (ZOFRAN) injection 4 mg  4 mg Intravenous Q6H PRN    pantoprazole (PROTONIX) 80 mg in sodium chloride 0 9 % 100 mL infusion  8 mg/hr Intravenous Continuous    PARoxetine (PAXIL) tablet 20 mg  20 mg Oral HS       Allergies   Allergen Reactions    Shellfish-Derived Products Anaphylaxis     seafood    Sulfa Antibiotics Anaphylaxis       Objective     Blood pressure 137/67, pulse 74, temperature 98 1 °F (36 7 °C), temperature source Oral, resp  rate 16, height 5' 7" (1 702 m), weight 83 9 kg (185 lb), SpO2 98 %, not currently breastfeeding      No intake or output data in the 24 hours ending 07/21/19 1050    PHYSICAL EXAM     GEN: well nourished, well developed, no acute distress  HEENT: anicteric, MMM, no cervical or supraclavicular lymphadenopathy  CV: RRR, no m/r/g  CHEST: CTA b/l, no WRR  ABD: +BS, soft, NT/ND, no hepatosplenomegaly  EXT: no c/c/e  SKIN: no rashes,  NEURO: aaox3    Lab Results:   Admission on 07/21/2019   Component Date Value    WBC 07/21/2019 13 56*    RBC 07/21/2019 4 61     Hemoglobin 07/21/2019 9 8*    Hematocrit 07/21/2019 34 5*    MCV 07/21/2019 75*    MCH 07/21/2019 21 3*    MCHC 07/21/2019 28 4*    RDW 07/21/2019 19 8*    MPV 07/21/2019 9 4     Platelets 04/00/1900 640*    nRBC 07/21/2019 0     Neutrophils Relative 07/21/2019 82*    Immat GRANS % 07/21/2019 1  Lymphocytes Relative 07/21/2019 10*    Monocytes Relative 07/21/2019 4     Eosinophils Relative 07/21/2019 2     Basophils Relative 07/21/2019 1     Neutrophils Absolute 07/21/2019 11 18*    Immature Grans Absolute 07/21/2019 0 07     Lymphocytes Absolute 07/21/2019 1 34     Monocytes Absolute 07/21/2019 0 56     Eosinophils Absolute 07/21/2019 0 30     Basophils Absolute 07/21/2019 0 11*    Sodium 07/21/2019 138     Potassium 07/21/2019 3 5     Chloride 07/21/2019 98*    CO2 07/21/2019 27     ANION GAP 07/21/2019 13     BUN 07/21/2019 13     Creatinine 07/21/2019 0 83     Glucose 07/21/2019 166*    Calcium 07/21/2019 9 0     AST 07/21/2019 16     ALT 07/21/2019 25     Alkaline Phosphatase 07/21/2019 172*    Total Protein 07/21/2019 7 3     Albumin 07/21/2019 3 4*    Total Bilirubin 07/21/2019 0 60     eGFR 07/21/2019 71     Lipase 07/21/2019 128     Extra Tube 07/21/2019 Hold for add-ons       ABO Grouping 07/21/2019 B     Rh Factor 07/21/2019 Positive     Antibody Screen 07/21/2019 Negative     Specimen Expiration Date 07/21/2019 [id number]     Sodium 07/21/2019 139     Potassium 07/21/2019 4 3     Chloride 07/21/2019 104     CO2 07/21/2019 28     ANION GAP 07/21/2019 7     BUN 07/21/2019 12     Creatinine 07/21/2019 0 54*    Glucose 07/21/2019 113     Glucose, Fasting 07/21/2019 113*    Calcium 07/21/2019 9 0     eGFR 07/21/2019 95     WBC 07/21/2019 16 54*    RBC 07/21/2019 3 97     Hemoglobin 07/21/2019 8 4*    Hematocrit 07/21/2019 29 9*    MCV 07/21/2019 75*    MCH 07/21/2019 21 2*    MCHC 07/21/2019 28 1*    RDW 07/21/2019 19 2*    Platelets 66/19/3200 505*    MPV 07/21/2019 9 5      Imaging Studies: I have personally reviewed pertinent films in PACS

## 2019-07-21 NOTE — PROGRESS NOTES
[facility] Progress Note  Patient: Hesham Oconnell 69 y o  female   MRN: [id number]  PCP: Jacey Oconnell MD  Unit/Bed#: [address] [place] [address] Encounter: [id number]  Date Of Visit: 07/21/19    Problem List:    Principal Problem:    Upper GI bleed  Active Problems:    Partial intestinal obstruction (HCC)    S/P DANNY-BSO (total abdominal hysterectomy and bilateral salpingo-oophorectomy)    Anemia    Benign essential hypertension    Asymptomatic stenosis of left carotid artery    Hyperlipidemia    Asthma    Depression      Assessment & Plan:    Partial intestinal obstruction ([address] )  Assessment & Plan  Presented with nausea vomiting  Status post TAHBSO on 07/10, requiring intraoperative surgical concern for extensive lysis of adhesion complicated by serosal tear and mesenteric defect which was corrected  Noted to have leukocytosis  Obstructive series concerning for small-bowel obstruction  Currently abdominal exam is benign  Passing flatus and had bowel movement earlier today  Keep NPO, IV fluids  Monitor abdominal exam, monitor intake and output  NG tube if recurrence of nausea vomiting  CT scan of the abdomen and pelvis  Follow-up labs  Surgical evaluation    S/P DANNY-BSO (total abdominal hysterectomy and bilateral salpingo-oophorectomy)  Assessment & Plan  Status post TAHBSO on 07/10, requiring intraoperative surgical concern for extensive lysis of adhesion complicated by serosal tear and mesenteric defect which was corrected  Biopsy revealed presence of endometrial carcinoma  Patient is awaiting follow-up with gyn Oncology in August  Follow-up CT scan as above    * Upper GI bleed  Assessment & Plan  Nausea vomiting and coffee-ground emesis  Denies melena, black tarry stool    BUN within normal limit  Likely secondary to above  Presented to hemoglobin at 9 8 grams/deciliters, down to 8 4 grams/deciliter today which is similar to her postoperative level  · Will monitor H&H, transfuse as needed  · Continue Protonix drip for now  · Follow up GI recommendation    Anemia  Assessment & Plan  Acute on chronic secondary to acute blood loss  Monitor H&H, transfuse as needed    Hypertension  Assessment & Plan  Hold norvasc, continue atenolol    Depression  Assessment & Plan  Continue paxil     Asthma  Assessment & Plan  Stable  Albuterol PRN    Hyperlipidemia  Assessment & Plan  Continue statin    Asymptomatic stenosis of left carotid artery  Assessment & Plan  Hold aspirin given acute GI bleed  Continue statin    Benign essential hypertension  Assessment & Plan  Continue atenolol  Holding Norvasc at present  Monitor    Addendum  CT scan results noted  Patient was seen and examined  Remains pain free  No further nausea vomiting or bowel movement  No bleeding  Abdomen remained soft  Discussed with GI, Dr Kingsley Lane and reviewed CT scan findings  Patient will be scheduled for EGD in a m   Monitor abdominal exam closely  Discussed with patient and family at bedside  VTE Pharmacologic Prophylaxis:   Pharmacologic: Pharmacologic VTE Prophylaxis contraindicated due to GI bleed  Mechanical VTE Prophylaxis in Place: Yes    Patient Centered Rounds: I have performed bedside rounds with nursing staff today  Discussions with Specialists or Other Care Team Provider:  Surgical team, Dr Kingsley Lane    Education and Discussions with Family / Patient:  Family at bedside    Time Spent for Care: 45 minutes  More than 50% of total time spent on counseling and coordination of care as described above      Current Length of Stay: 0 day(s)    Current Patient Status: Inpatient   Certification Statement: The patient will continue to require additional inpatient hospital stay due to GI bleed and possible bowel obstruction    Discharge Plan:  Home when clinically improved    Code Status: Level 1 - Full Code      Subjective:   Feels much better since arrival to the hospital  No further vomiting since in ED  Denies abdominal pain  Denies chest pain shortness of breath palpitation    Patient reported that she underwent DANNY + BSO on 7/10 for postmenopausal bleeding and endometrial hyperplasia  Patient was intraoperatively noted to have extensive adhesions  Patient reported that after surgery she continued to have dry heaving and retching  She continued to pass flatus postoperatively but without good bowel movement  Patient denied any fever chills or abdominal pain  Yesterday she had multiple episode of vomiting which appeared dark and she presented to ED  Prior hospitalization records were reviewed  Objective:     Vitals:   Temp (24hrs), Av 1 °F (36 7 °C), Min:97 9 °F (36 6 °C), Max:98 4 °F (36 9 °C)    Temp:  [97 9 °F (36 6 °C)-98 4 °F (36 9 °C)] 98 1 °F (36 7 °C)  HR:  [74-82] 74  Resp:  [12-16] 16  BP: (137-153)/(67-70) 137/67  SpO2:  [96 %-98 %] 98 %  Body mass index is 28 98 kg/m²  Input and Output Summary (last 24 hours):     No intake or output data in the 24 hours ending 19 1046    Physical Exam:     Physical Exam   Constitutional: She is oriented to person, place, and time  She appears well-developed  No distress  HENT:   Head: Normocephalic and atraumatic  Nose: Nose normal    Eyes: Pupils are equal, round, and reactive to light  Conjunctivae are normal    Neck: Normal range of motion  Neck supple  Cardiovascular: Normal rate, regular rhythm and normal heart sounds  Pulmonary/Chest: Effort normal and breath sounds normal  No respiratory distress  She has no wheezes  She has no rales  Diminished   Abdominal: Soft  Bowel sounds are normal  She exhibits no distension  There is tenderness (Mild right lower quadrant)  There is no rebound and no guarding  Incision appears C/D/I   Musculoskeletal: Normal range of motion  She exhibits no edema  Neurological: She is alert and oriented to person, place, and time  No cranial nerve deficit  Skin: Skin is warm and dry  No rash noted  There is pallor     Psychiatric: She has a normal mood and affect  Additional Data:     Labs:    Results from last 7 days   Lab Units 07/21/19  0619 07/21/19 0229   WBC Thousand/uL 16 54* 13 56*   HEMOGLOBIN g/dL 8 4* 9 8*   HEMATOCRIT % 29 9* 34 5*   PLATELETS Thousands/uL 505* 640*   NEUTROS PCT %  --  82*   LYMPHS PCT %  --  10*   MONOS PCT %  --  4   EOS PCT %  --  2     Results from last 7 days   Lab Units 07/21/19  0619 07/21/19 0229   POTASSIUM mmol/L 4 3 3 5   CHLORIDE mmol/L 104 98*   CO2 mmol/L 28 27   BUN mg/dL 12 13   CREATININE mg/dL 0 54* 0 83   CALCIUM mg/dL 9 0 9 0   ALK PHOS U/L  --  172*   ALT U/L  --  25   AST U/L  --  16           * I Have Reviewed All Lab Data Listed Above  * Additional Pertinent Lab Tests Reviewed: All Labs Within Last 24 Hours Reviewed      Imaging:  Imaging Reports Reviewed Today Include:  Obstructive series    Recent Cultures (last 7 days):           Last 24 Hours Medication List:     Current Facility-Administered Medications:  acetaminophen 650 mg Oral Q6H PRN Cydney Kaufman MD    [START ON 7/22/2019] amLODIPine 2 5 mg Oral Daily Cydney Kaufman MD    atenolol 25 mg Oral HS Waylan Molt, CRNP    atorvastatin 10 mg Oral HS Waylan Molt, CRNP    dextrose 5 % and sodium chloride 0 45 % 75 mL/hr Intravenous Continuous Cydney Kaufman MD    ondansetron 4 mg Intravenous Q6H PRN Jerilyn Molt, CRNP    pantoprozole (PROTONIX) infusion (Continuous) 8 mg/hr Intravenous Continuous Timolan Molt, CRNP Last Rate: 8 mg/hr (07/21/19 0749)   PARoxetine 20 mg Oral HS Timolan Molt, CRNP           Today, Patient Was Seen By: Cydney Kaufman MD    ** Please Note: "This note has been constructed using a voice recognition system  Therefore there may be syntax, spelling, and/or grammatical errors   Please call if you have any questions  "**

## 2019-07-22 ENCOUNTER — APPOINTMENT (INPATIENT)
Dept: PERIOP | Facility: HOSPITAL | Age: 74
DRG: 391 | End: 2019-07-22
Payer: MEDICARE

## 2019-07-22 ENCOUNTER — TELEPHONE (OUTPATIENT)
Dept: OBGYN CLINIC | Facility: CLINIC | Age: 74
End: 2019-07-22

## 2019-07-22 ENCOUNTER — ANESTHESIA (INPATIENT)
Dept: PERIOP | Facility: HOSPITAL | Age: 74
DRG: 391 | End: 2019-07-22
Payer: MEDICARE

## 2019-07-22 ENCOUNTER — ANESTHESIA EVENT (INPATIENT)
Dept: PERIOP | Facility: HOSPITAL | Age: 74
DRG: 391 | End: 2019-07-22
Payer: MEDICARE

## 2019-07-22 PROBLEM — K44.9 LARGE HIATAL HERNIA: Status: ACTIVE | Noted: 2019-07-21

## 2019-07-22 LAB
ABO GROUP BLD BPU: NORMAL
ALBUMIN SERPL BCP-MCNC: 2.7 G/DL (ref 3.5–5)
ALP SERPL-CCNC: 135 U/L (ref 46–116)
ALT SERPL W P-5'-P-CCNC: 18 U/L (ref 12–78)
ANION GAP SERPL CALCULATED.3IONS-SCNC: 7 MMOL/L (ref 4–13)
AST SERPL W P-5'-P-CCNC: 13 U/L (ref 5–45)
ATRIAL RATE: 72 BPM
BASOPHILS # BLD AUTO: 0.09 THOUSANDS/ΜL (ref 0–0.1)
BASOPHILS NFR BLD AUTO: 1 % (ref 0–1)
BILIRUB DIRECT SERPL-MCNC: 0.2 MG/DL (ref 0–0.2)
BILIRUB SERPL-MCNC: 0.6 MG/DL (ref 0.2–1)
BPU ID: NORMAL
BUN SERPL-MCNC: 7 MG/DL (ref 5–25)
CALCIUM SERPL-MCNC: 8 MG/DL (ref 8.3–10.1)
CHLORIDE SERPL-SCNC: 106 MMOL/L (ref 100–108)
CO2 SERPL-SCNC: 29 MMOL/L (ref 21–32)
CREAT SERPL-MCNC: 0.69 MG/DL (ref 0.6–1.3)
CROSSMATCH: NORMAL
EOSINOPHIL # BLD AUTO: 0.55 THOUSAND/ΜL (ref 0–0.61)
EOSINOPHIL NFR BLD AUTO: 4 % (ref 0–6)
ERYTHROCYTE [DISTWIDTH] IN BLOOD BY AUTOMATED COUNT: 20.4 % (ref 11.6–15.1)
GFR SERPL CREATININE-BSD FRML MDRD: 87 ML/MIN/1.73SQ M
GLUCOSE SERPL-MCNC: 102 MG/DL (ref 65–140)
HCT VFR BLD AUTO: 31.6 % (ref 34.8–46.1)
HGB BLD-MCNC: 7.4 G/DL (ref 11.5–15.4)
HGB BLD-MCNC: 9.3 G/DL (ref 11.5–15.4)
HGB BLD-MCNC: 9.7 G/DL (ref 11.5–15.4)
IMM GRANULOCYTES # BLD AUTO: 0.07 THOUSAND/UL (ref 0–0.2)
IMM GRANULOCYTES NFR BLD AUTO: 1 % (ref 0–2)
LYMPHOCYTES # BLD AUTO: 1.08 THOUSANDS/ΜL (ref 0.6–4.47)
LYMPHOCYTES NFR BLD AUTO: 9 % (ref 14–44)
MCH RBC QN AUTO: 23.3 PG (ref 26.8–34.3)
MCHC RBC AUTO-ENTMCNC: 29.4 G/DL (ref 31.4–37.4)
MCV RBC AUTO: 79 FL (ref 82–98)
MONOCYTES # BLD AUTO: 0.76 THOUSAND/ΜL (ref 0.17–1.22)
MONOCYTES NFR BLD AUTO: 6 % (ref 4–12)
MRSA NOSE QL CULT: NORMAL
NEUTROPHILS # BLD AUTO: 10.14 THOUSANDS/ΜL (ref 1.85–7.62)
NEUTS SEG NFR BLD AUTO: 79 % (ref 43–75)
NRBC BLD AUTO-RTO: 0 /100 WBCS
P AXIS: 29 DEGREES
PLATELET # BLD AUTO: 452 THOUSANDS/UL (ref 149–390)
PMV BLD AUTO: 9.4 FL (ref 8.9–12.7)
POTASSIUM SERPL-SCNC: 3.7 MMOL/L (ref 3.5–5.3)
PR INTERVAL: 142 MS
PROT SERPL-MCNC: 6 G/DL (ref 6.4–8.2)
QRS AXIS: 11 DEGREES
QRSD INTERVAL: 82 MS
QT INTERVAL: 420 MS
QTC INTERVAL: 459 MS
RBC # BLD AUTO: 3.99 MILLION/UL (ref 3.81–5.12)
SODIUM SERPL-SCNC: 142 MMOL/L (ref 136–145)
T WAVE AXIS: 31 DEGREES
UNIT DISPENSE STATUS: NORMAL
UNIT PRODUCT CODE: NORMAL
UNIT RH: NORMAL
VENTRICULAR RATE: 72 BPM
WBC # BLD AUTO: 12.69 THOUSAND/UL (ref 4.31–10.16)

## 2019-07-22 PROCEDURE — 85018 HEMOGLOBIN: CPT | Performed by: INTERNAL MEDICINE

## 2019-07-22 PROCEDURE — G8980 MOBILITY D/C STATUS: HCPCS

## 2019-07-22 PROCEDURE — C9113 INJ PANTOPRAZOLE SODIUM, VIA: HCPCS | Performed by: INTERNAL MEDICINE

## 2019-07-22 PROCEDURE — G8978 MOBILITY CURRENT STATUS: HCPCS

## 2019-07-22 PROCEDURE — P9016 RBC LEUKOCYTES REDUCED: HCPCS

## 2019-07-22 PROCEDURE — 99232 SBSQ HOSP IP/OBS MODERATE 35: CPT | Performed by: INTERNAL MEDICINE

## 2019-07-22 PROCEDURE — 97161 PT EVAL LOW COMPLEX 20 MIN: CPT

## 2019-07-22 PROCEDURE — 30233N1 TRANSFUSION OF NONAUTOLOGOUS RED BLOOD CELLS INTO PERIPHERAL VEIN, PERCUTANEOUS APPROACH: ICD-10-PCS | Performed by: STUDENT IN AN ORGANIZED HEALTH CARE EDUCATION/TRAINING PROGRAM

## 2019-07-22 PROCEDURE — 85025 COMPLETE CBC W/AUTO DIFF WBC: CPT | Performed by: INTERNAL MEDICINE

## 2019-07-22 PROCEDURE — 80048 BASIC METABOLIC PNL TOTAL CA: CPT | Performed by: INTERNAL MEDICINE

## 2019-07-22 PROCEDURE — 99024 POSTOP FOLLOW-UP VISIT: CPT | Performed by: SPECIALIST

## 2019-07-22 PROCEDURE — G8979 MOBILITY GOAL STATUS: HCPCS

## 2019-07-22 PROCEDURE — 93010 ELECTROCARDIOGRAM REPORT: CPT | Performed by: INTERNAL MEDICINE

## 2019-07-22 PROCEDURE — 87081 CULTURE SCREEN ONLY: CPT | Performed by: INTERNAL MEDICINE

## 2019-07-22 PROCEDURE — C9113 INJ PANTOPRAZOLE SODIUM, VIA: HCPCS | Performed by: NURSE PRACTITIONER

## 2019-07-22 PROCEDURE — 43235 EGD DIAGNOSTIC BRUSH WASH: CPT | Performed by: INTERNAL MEDICINE

## 2019-07-22 PROCEDURE — 80076 HEPATIC FUNCTION PANEL: CPT | Performed by: INTERNAL MEDICINE

## 2019-07-22 PROCEDURE — NC001 PR NO CHARGE: Performed by: INTERNAL MEDICINE

## 2019-07-22 PROCEDURE — 0DJ08ZZ INSPECTION OF UPPER INTESTINAL TRACT, VIA NATURAL OR ARTIFICIAL OPENING ENDOSCOPIC: ICD-10-PCS | Performed by: INTERNAL MEDICINE

## 2019-07-22 RX ORDER — LANOLIN ALCOHOL/MO/W.PET/CERES
3 CREAM (GRAM) TOPICAL
Status: DISCONTINUED | OUTPATIENT
Start: 2019-07-22 | End: 2019-07-23

## 2019-07-22 RX ORDER — PROPOFOL 10 MG/ML
INJECTION, EMULSION INTRAVENOUS CONTINUOUS PRN
Status: DISCONTINUED | OUTPATIENT
Start: 2019-07-22 | End: 2019-07-22 | Stop reason: SURG

## 2019-07-22 RX ORDER — PROPOFOL 10 MG/ML
INJECTION, EMULSION INTRAVENOUS AS NEEDED
Status: DISCONTINUED | OUTPATIENT
Start: 2019-07-22 | End: 2019-07-22 | Stop reason: SURG

## 2019-07-22 RX ORDER — SODIUM CHLORIDE 9 MG/ML
INJECTION, SOLUTION INTRAVENOUS CONTINUOUS PRN
Status: DISCONTINUED | OUTPATIENT
Start: 2019-07-22 | End: 2019-07-22 | Stop reason: SURG

## 2019-07-22 RX ADMIN — DEXTROSE AND SODIUM CHLORIDE 75 ML/HR: 5; .45 INJECTION, SOLUTION INTRAVENOUS at 15:55

## 2019-07-22 RX ADMIN — PROPOFOL 120 MCG/KG/MIN: 10 INJECTION, EMULSION INTRAVENOUS at 12:39

## 2019-07-22 RX ADMIN — PAROXETINE 20 MG: 20 TABLET, FILM COATED ORAL at 21:45

## 2019-07-22 RX ADMIN — SODIUM CHLORIDE 8 MG/HR: 9 INJECTION, SOLUTION INTRAVENOUS at 15:52

## 2019-07-22 RX ADMIN — SODIUM CHLORIDE: 0.9 INJECTION, SOLUTION INTRAVENOUS at 12:26

## 2019-07-22 RX ADMIN — PROPOFOL 50 MG: 10 INJECTION, EMULSION INTRAVENOUS at 12:40

## 2019-07-22 RX ADMIN — DEXTROSE AND SODIUM CHLORIDE 75 ML/HR: 5; .45 INJECTION, SOLUTION INTRAVENOUS at 00:28

## 2019-07-22 RX ADMIN — ONDANSETRON 4 MG: 2 INJECTION INTRAMUSCULAR; INTRAVENOUS at 20:02

## 2019-07-22 RX ADMIN — ATENOLOL 25 MG: 25 TABLET ORAL at 21:45

## 2019-07-22 RX ADMIN — SODIUM CHLORIDE 8 MG/HR: 9 INJECTION, SOLUTION INTRAVENOUS at 00:28

## 2019-07-22 RX ADMIN — ATORVASTATIN CALCIUM 10 MG: 10 TABLET, FILM COATED ORAL at 21:45

## 2019-07-22 NOTE — PROGRESS NOTES
Progress Note - General Surgery   Grabiel Phan 69 y o  female MRN: [id number]  Unit/Bed#: [address] [address] Encounter: [id number]    Assessment:  · Upper GI Bleed: Presented to ED with coffee brown emesis  Hgb 7 4 > 9 3 s/p 1 unit RBC  · Parietal intestinal obstruction; S/p  laparoscopic hysterectomy with b/l salpingo-oophrectomy and extensive TAMMY with closure of mesenteric defect on 7/10  CT scan revealed: Large hiatal hernia with combined organoaxial and mesenteroaxial volvulus  AVSS  WBC 16 54  > 12 69  Passing flatus and having BM  Benign abdominal exam      Plan:  No acute surgical intervention at this time  GI consulted  Need for upper endoscopy  Continue NPO  Continue IV fluids  PRN zofran  Continue to monitor H&H  Transfuse as needed  Continue Protonix  Subjective/Objective   Chief Complaint: " I feel great since the vomiting stopped when I initially came in "    Subjective: Patient was seen and examined bedside  Patient reports no acute changes through the night  Patient reports she has not had any nausea or vomiting since being in the Emergency dept  Denies abdominal pain, chest pain, shortness of breath  Blood pressure 136/62, pulse 64, temperature 98 °F (36 7 °C), temperature source Oral, resp  rate 18, height 5' 7" (1 702 m), weight 83 9 kg (185 lb), SpO2 92 %, not currently breastfeeding  ,Body mass index is 28 98 kg/m²  Intake/Output Summary (Last 24 hours) at 7/22/2019 0834  Last data filed at 7/22/2019 7556  Gross per 24 hour   Intake 1361 25 ml   Output 150 ml   Net 1211 25 ml       Invasive Devices     Peripheral Intravenous Line            Peripheral IV 07/21/19 Right Antecubital 1 day                Physical Exam: /62 (BP Location: Left arm)   Pulse 64   Temp 98 °F (36 7 °C) (Oral)   Resp 18   Ht 5' 7" (1 702 m)   Wt 83 9 kg (185 lb)   SpO2 92%   Breastfeeding?  No   BMI 28 98 kg/m²   General appearance: alert and oriented, in no acute distress and alert  Lungs: clear to auscultation bilaterally  Abdomen: soft, non-tender; bowel sounds normal; no masses,  no organomegaly Abdominal incisions clean dry and intact, healing well  Lab, Imaging and other studies:  I have personally reviewed pertinent lab results    , CBC:   Lab Results   Component Value Date    WBC 12 69 (H) 07/22/2019    HGB 9 3 (L) 07/22/2019    HCT 31 6 (L) 07/22/2019    MCV 79 (L) 07/22/2019     (H) 07/22/2019    MCH 23 3 (L) 07/22/2019    MCHC 29 4 (L) 07/22/2019    RDW 20 4 (H) 07/22/2019    MPV 9 4 07/22/2019    NRBC 0 07/22/2019   , CMP:   Lab Results   Component Value Date    SODIUM 142 07/22/2019    K 3 7 07/22/2019     07/22/2019    CO2 29 07/22/2019    BUN 7 07/22/2019    CREATININE 0 69 07/22/2019    CALCIUM 8 0 (L) 07/22/2019    AST 13 07/22/2019    ALT 18 07/22/2019    ALKPHOS 135 (H) 07/22/2019    EGFR 87 07/22/2019     VTE Pharmacologic Prophylaxis: Sequential compression device (Venodyne)   VTE Mechanical Prophylaxis: sequential compression device

## 2019-07-22 NOTE — PROGRESS NOTES
Patient with coffee ground emesis, hgb decreased today to 7 4  No further vomiting  No melena  Plan for EGD today to assess for bleeding from Donn's ulcers  Discussed with surgery and cleared for GI procedure

## 2019-07-22 NOTE — ANESTHESIA POSTPROCEDURE EVALUATION
Post-Op Assessment Note    CV Status:  Stable  Pain Score: 0    Pain management: adequate     Mental Status:  Alert and sleepy   Hydration Status:  Stable   PONV Controlled:  None   Airway Patency:  Patent   Post Op Vitals Reviewed: Yes      Staff: CRNA   Comments: HOB @30 DEG, VSS, SPONT BREATHING, NO AC, FULLY ENDORSED TO RECOVERY RN          /89 (07/22/19 1246)    Temp      Pulse 81 (07/22/19 1246)   Resp 16 (07/22/19 1246)    SpO2 96 % (07/22/19 1246) Father  Still living? Unknown  Family history of early CAD, Age at diagnosis: Age Unknown     Mother  Still living? Unknown  Family history of diabetes mellitus in mother, Age at diagnosis: Age Unknown 29-Jul-2018

## 2019-07-22 NOTE — ANESTHESIA PREPROCEDURE EVALUATION
Review of Systems/Medical History  Patient summary reviewed  Chart reviewed  No history of anesthetic complications     Cardiovascular  EKG reviewed, Exercise tolerance (METS): >4,  Hyperlipidemia, Hypertension , CAD , PVD (Asymptomatic stenosis of left carotid artery),   Pulmonary hypertension Pulmonary  Asthma , well controlled/ stable , Shortness of breath,        GI/Hepatic    GERD ,  Hiatal hernia,             Endo/Other     GYN       Hematology  Anemia ,     Musculoskeletal    Arthritis     Neurology   Psychology   Depression ,              Physical Exam    Airway    Mallampati score: II  TM Distance: >3 FB  Neck ROM: full     Dental   No notable dental hx     Cardiovascular  Cardiovascular exam normal    Pulmonary  Pulmonary exam normal     Other Findings        Anesthesia Plan  ASA Score- 3     Anesthesia Type- IV sedation with anesthesia with ASA Monitors  Additional Monitors:   Airway Plan:         Plan Factors-    Induction-     Postoperative Plan-     Informed Consent- Anesthetic plan and risks discussed with patient  I personally reviewed this patient with the CRNA  Discussed and agreed on the Anesthesia Plan with the CRNA  Georgette Ormond

## 2019-07-22 NOTE — PHYSICAL THERAPY NOTE
PT EVALUATION  Pt evaluated and is independent with all mobility  OT evaluation deferred as pt remains independent with ADLs     07/22/19 0940   Note Type   Note type Eval only   Home Living   Type of [address] Two level   Prior Function   Level of [place] Independent with ADLs and functional mobility   General   Additional Pertinent History Pt admitted with vomitting now resolved  Cognition   Overall Cognitive Status WFL   RLE Assessment   RLE Assessment WNL   LLE Assessment   LLE Assessment WNL   Bed Mobility   Supine to Sit 7  Independent   Sit to Supine 7  Independent   Transfers   Sit to Stand 7  Independent   Stand to Sit 7  Independent   Stand pivot 7  Independent   Ambulation/Elevation   Gait pattern WNL   Gait Assistance 7  Independent   Distance 300 feet   Balance   Static Sitting Good   Dynamic Sitting Good   Static Standing Good   Dynamic Standing Good   Activity Tolerance   Activity Tolerance Patient tolerated treatment well   Assessment   Prognosis Good   Problem List   (none)   Assessment Patient seen for Physical Therapy evaluation  Patient admitted with Upper GI bleed  Comorbidities affecting patient's physical performance include: anemia, upper GI bleed, depression  Personal factors affecting patient at time of initial evaluation include: lives in 2 story house  Prior to admission, patient was independent with functional mobility without assistive device and independent with ADLS  Please find objective findings from Physical Therapy assessment regarding body systems outlined above with impairments and limitations including none  The Barthel Index was used as a functional outcome tool presenting with a score of 100 today indicating no limitations of functional mobility and ADLS  Patient's clinical presentation is currently stable  as seen in patient's presentation of baseline   As demonstrated by objective findings, the assigned level of complexity for this evaluation is low     Goals   Patient Goals go home   Plan   Treatment/Interventions   (pt to ambulate ad vikash)   Recommendation   Recommendation Home independently   Barthel Index   Feeding 10   Bathing 5   Grooming Score 5   Dressing Score 10   Bladder Score 10   Bowels Score 10   Toilet Use Score 10   Transfers (Bed/Chair) Score 15   Mobility (Level Surface) Score 15   Stairs Score 10   Barthel Index Score 597   Licensure   [place] License Number  [place] WINDY [id number]

## 2019-07-22 NOTE — PROGRESS NOTES
[facility] Progress Note  Patient: Charity Gonzalez 69 y o  female   MRN: [id number]  PCP: Mahesh Waters MD  Unit/Bed#: [address] [place] [address] Encounter: [id number]  Date Of Visit: 07/22/19    Problem List:    Principal Problem:    Upper GI bleed  Active Problems:    Large hiatal hernia with possible volvulus    S/P DANNY-BSO (total abdominal hysterectomy and bilateral salpingo-oophorectomy)    Anemia    Benign essential hypertension    Asymptomatic stenosis of left carotid artery    Hyperlipidemia    Asthma    Depression      Assessment & Plan:    Large hiatal hernia with possible volvulus  Assessment & Plan  Presented with nausea vomiting  Status post TAHBSO on 07/10, requiring intraoperative surgical concern for extensive lysis of adhesion complicated by serosal tear and mesenteric defect which was corrected  Noted to have leukocytosis  Obstructive series concerning for air-fluid level in hiatal hernia and possible small-bowel obstruction  CT of the abdomen-evidence of large hiatal hernia with evidence of volvulus  No evidence of strangulation  Small collection noted along with low transverse abdominal incision, likely seroma or hematoma  Abdominal exam remains benign  No further nausea vomiting  Keep NPO, IV fluids, IV PPI  GI input appreciated, plan for EGD today  Monitor abdominal exam  GI and surgery is following    * Upper GI bleed  Assessment & Plan  Nausea vomiting and coffee-ground emesis  Denies melena, black tarry stool    BUN within normal limit  Likely secondary to above  Presented to hemoglobin at 9 8 grams/deciliters, down to 7 4 grams/deciliter  Status post 1 PRBC on 07/21 with appropriate improvement  · Will monitor H&H, transfuse as needed  · Continue Protonix drip for now  · Follow-up EGD findings  · Follow up GI     S/P DANNY-BSO (total abdominal hysterectomy and bilateral salpingo-oophorectomy)  Assessment & Plan  Status post TAHBSO on 07/10, requiring intraoperative surgical concern for extensive lysis of adhesion complicated by serosal tear and mesenteric defect which was corrected  Biopsy revealed presence of endometrial carcinoma  Patient is awaiting follow-up with gyn Oncology in August  CT scan as above    Anemia  Assessment & Plan  Acute on chronic secondary to acute blood loss  Monitor H&H, transfuse as needed    Hypertension  Assessment & Plan  Hold norvasc, continue atenolol    Depression  Assessment & Plan  Continue paxil     Asthma  Assessment & Plan  Stable  Albuterol PRN    Hyperlipidemia  Assessment & Plan  Continue statin    Asymptomatic stenosis of left carotid artery  Assessment & Plan  Hold aspirin given acute GI bleed  Continue statin    Benign essential hypertension  Assessment & Plan  Continue atenolol  Holding Norvasc at present  Monitor        VTE Pharmacologic Prophylaxis:   Pharmacologic: Pharmacologic VTE Prophylaxis contraindicated due to GI bleed  Mechanical VTE Prophylaxis in Place: Yes    Patient Centered Rounds: I have performed bedside rounds with nursing staff today  Discussions with Specialists or Other Care Team Provider:  Surgical team, GI    Education and Discussions with Family / Patient:  Yes    Time Spent for Care: 45 minutes  More than 50% of total time spent on counseling and coordination of care as described above      Current Length of Stay: 1 day(s)    Current Patient Status: Inpatient   Certification Statement: The patient will continue to require additional inpatient hospital stay due to GI bleed and possible bowel obstruction    Discharge Plan:  Home when clinically improved    Code Status: Level 1 - Full Code      Subjective:   Patient denies any further abdominal pain or nausea  No further bleeding  Bowel movement yesterday with brown stool  Denies chest pain or shortness of breath    Hemoglobin gradually trended down to 7 4 grams/deciliters last night and subsequently patient was given 1 unit PRBC          Objective:   Comfortably sitting in chair  Vitals:   Temp (24hrs), Av 3 °F (36 8 °C), Min:98 °F (36 7 °C), Max:99 °F (37 2 °C)    Temp:  [98 °F (36 7 °C)-99 °F (37 2 °C)] 98 °F (36 7 °C)  HR:  [62-79] 64  Resp:  [18] 18  BP: (124-141)/(56-73) 136/62  SpO2:  [92 %-100 %] 92 %  Body mass index is 28 98 kg/m²  Input and Output Summary (last 24 hours): Intake/Output Summary (Last 24 hours) at 2019 0938  Last data filed at 2019 8121  Gross per 24 hour   Intake 1361 25 ml   Output 150 ml   Net 1211 25 ml       Physical Exam:     Physical Exam   Constitutional: She appears well-developed  No distress  HENT:   Head: Normocephalic and atraumatic  Nose: Nose normal    Eyes: Pupils are equal, round, and reactive to light  Conjunctivae are normal    Neck: Normal range of motion  Neck supple  Cardiovascular: Normal rate, regular rhythm and normal heart sounds  Pulmonary/Chest: Effort normal and breath sounds normal  No respiratory distress  She has no wheezes  She has no rales  Abdominal: Soft  Bowel sounds are normal  She exhibits no distension  There is tenderness (Mild tenderness and right lower quadrant)  There is no rebound and no guarding  Incision C/D/I   Musculoskeletal: She exhibits no edema  Neurological: She is alert  No cranial nerve deficit  Skin: Skin is warm and dry  No rash noted  Additional Data:     Labs:    Results from last 7 days   Lab Units 19  0808   WBC Thousand/uL 12 69*   HEMOGLOBIN g/dL 9 3*   HEMATOCRIT % 31 6*   PLATELETS Thousands/uL 452*   NEUTROS PCT % 79*   LYMPHS PCT % 9*   MONOS PCT % 6   EOS PCT % 4     Results from last 7 days   Lab Units 19  0808   POTASSIUM mmol/L 3 7   CHLORIDE mmol/L 106   CO2 mmol/L 29   BUN mg/dL 7   CREATININE mg/dL 0 69   CALCIUM mg/dL 8 0*   ALK PHOS U/L 135*   ALT U/L 18   AST U/L 13           * I Have Reviewed All Lab Data Listed Above  * Additional Pertinent Lab Tests Reviewed:  All Labs Within Last 24 Hours Reviewed      Imaging:  Imaging Reports Reviewed Today Include:  Obstructive series    Recent Cultures (last 7 days):           Last 24 Hours Medication List:     Current Facility-Administered Medications:  acetaminophen 650 mg Oral Q6H PRN Quincy Sams MD    atenolol 25 mg Oral HS Tyrone Scripture, CRNP    atorvastatin 10 mg Oral HS Tyrone Scripture, CRNP    dextrose 5 % and sodium chloride 0 45 % 75 mL/hr Intravenous Continuous Quincy Sams MD Last Rate: 75 mL/hr (07/22/19 0028)   ondansetron 4 mg Intravenous Q6H PRN Tyrone Juanure, CRNP    pantoprozole (PROTONIX) infusion (Continuous) 8 mg/hr Intravenous Continuous Tyrone Scripture, CRNP Last Rate: 8 mg/hr (07/22/19 0028)   PARoxetine 20 mg Oral HS Tyrone Holman, CRNP           Today, Patient Was Seen By: Quincy Sams MD    ** Please Note: "This note has been constructed using a voice recognition system  Therefore there may be syntax, spelling, and/or grammatical errors   Please call if you have any questions  "**

## 2019-07-22 NOTE — TELEPHONE ENCOUNTER
----- Message from Irvin Samuels MD sent at 7/19/2019  3:59 PM EDT -----  Please call and check on her and be sure she has f/u appointment with gyn onc

## 2019-07-23 ENCOUNTER — TELEPHONE (OUTPATIENT)
Dept: OTHER | Facility: HOSPITAL | Age: 74
End: 2019-07-23

## 2019-07-23 ENCOUNTER — APPOINTMENT (OUTPATIENT)
Dept: RADIOLOGY | Facility: HOSPITAL | Age: 74
DRG: 391 | End: 2019-07-23
Payer: MEDICARE

## 2019-07-23 LAB
ANION GAP SERPL CALCULATED.3IONS-SCNC: 7 MMOL/L (ref 4–13)
BASOPHILS # BLD AUTO: 0.08 THOUSANDS/ΜL (ref 0–0.1)
BASOPHILS NFR BLD AUTO: 1 % (ref 0–1)
BUN SERPL-MCNC: 4 MG/DL (ref 5–25)
CALCIUM SERPL-MCNC: 8.2 MG/DL (ref 8.3–10.1)
CHLORIDE SERPL-SCNC: 104 MMOL/L (ref 100–108)
CO2 SERPL-SCNC: 30 MMOL/L (ref 21–32)
CREAT SERPL-MCNC: 0.64 MG/DL (ref 0.6–1.3)
EOSINOPHIL # BLD AUTO: 0.58 THOUSAND/ΜL (ref 0–0.61)
EOSINOPHIL NFR BLD AUTO: 5 % (ref 0–6)
ERYTHROCYTE [DISTWIDTH] IN BLOOD BY AUTOMATED COUNT: 20.6 % (ref 11.6–15.1)
GFR SERPL CREATININE-BSD FRML MDRD: 89 ML/MIN/1.73SQ M
GLUCOSE SERPL-MCNC: 93 MG/DL (ref 65–140)
HCT VFR BLD AUTO: 30.6 % (ref 34.8–46.1)
HGB BLD-MCNC: 8.9 G/DL (ref 11.5–15.4)
IMM GRANULOCYTES # BLD AUTO: 0.05 THOUSAND/UL (ref 0–0.2)
IMM GRANULOCYTES NFR BLD AUTO: 1 % (ref 0–2)
LYMPHOCYTES # BLD AUTO: 1.24 THOUSANDS/ΜL (ref 0.6–4.47)
LYMPHOCYTES NFR BLD AUTO: 12 % (ref 14–44)
MCH RBC QN AUTO: 23 PG (ref 26.8–34.3)
MCHC RBC AUTO-ENTMCNC: 29.1 G/DL (ref 31.4–37.4)
MCV RBC AUTO: 79 FL (ref 82–98)
MONOCYTES # BLD AUTO: 0.75 THOUSAND/ΜL (ref 0.17–1.22)
MONOCYTES NFR BLD AUTO: 7 % (ref 4–12)
MRSA NOSE QL CULT: NORMAL
NEUTROPHILS # BLD AUTO: 8.04 THOUSANDS/ΜL (ref 1.85–7.62)
NEUTS SEG NFR BLD AUTO: 74 % (ref 43–75)
NRBC BLD AUTO-RTO: 0 /100 WBCS
PLATELET # BLD AUTO: 480 THOUSANDS/UL (ref 149–390)
PMV BLD AUTO: 10.1 FL (ref 8.9–12.7)
POTASSIUM SERPL-SCNC: 3 MMOL/L (ref 3.5–5.3)
RBC # BLD AUTO: 3.87 MILLION/UL (ref 3.81–5.12)
SODIUM SERPL-SCNC: 141 MMOL/L (ref 136–145)
WBC # BLD AUTO: 10.74 THOUSAND/UL (ref 4.31–10.16)

## 2019-07-23 PROCEDURE — 74240 X-RAY XM UPR GI TRC 1CNTRST: CPT

## 2019-07-23 PROCEDURE — C9113 INJ PANTOPRAZOLE SODIUM, VIA: HCPCS | Performed by: INTERNAL MEDICINE

## 2019-07-23 PROCEDURE — 99232 SBSQ HOSP IP/OBS MODERATE 35: CPT | Performed by: STUDENT IN AN ORGANIZED HEALTH CARE EDUCATION/TRAINING PROGRAM

## 2019-07-23 PROCEDURE — 99024 POSTOP FOLLOW-UP VISIT: CPT | Performed by: SURGERY

## 2019-07-23 PROCEDURE — 85025 COMPLETE CBC W/AUTO DIFF WBC: CPT | Performed by: INTERNAL MEDICINE

## 2019-07-23 PROCEDURE — 99232 SBSQ HOSP IP/OBS MODERATE 35: CPT | Performed by: INTERNAL MEDICINE

## 2019-07-23 PROCEDURE — 80048 BASIC METABOLIC PNL TOTAL CA: CPT | Performed by: INTERNAL MEDICINE

## 2019-07-23 RX ORDER — PANTOPRAZOLE SODIUM 40 MG/1
40 TABLET, DELAYED RELEASE ORAL
Status: DISCONTINUED | OUTPATIENT
Start: 2019-07-23 | End: 2019-07-24 | Stop reason: HOSPADM

## 2019-07-23 RX ORDER — AMLODIPINE BESYLATE 2.5 MG/1
2.5 TABLET ORAL DAILY
Status: DISCONTINUED | OUTPATIENT
Start: 2019-07-24 | End: 2019-07-24 | Stop reason: HOSPADM

## 2019-07-23 RX ORDER — ZOLPIDEM TARTRATE 5 MG/1
5 TABLET ORAL
Status: DISCONTINUED | OUTPATIENT
Start: 2019-07-23 | End: 2019-07-24 | Stop reason: HOSPADM

## 2019-07-23 RX ORDER — POTASSIUM CHLORIDE 14.9 MG/ML
20 INJECTION INTRAVENOUS ONCE
Status: COMPLETED | OUTPATIENT
Start: 2019-07-23 | End: 2019-07-23

## 2019-07-23 RX ORDER — POTASSIUM CHLORIDE 29.8 MG/ML
40 INJECTION INTRAVENOUS ONCE
Status: DISCONTINUED | OUTPATIENT
Start: 2019-07-23 | End: 2019-07-23 | Stop reason: CLARIF

## 2019-07-23 RX ADMIN — POTASSIUM CHLORIDE 20 MEQ: 200 INJECTION, SOLUTION INTRAVENOUS at 10:55

## 2019-07-23 RX ADMIN — ATENOLOL 25 MG: 25 TABLET ORAL at 22:04

## 2019-07-23 RX ADMIN — PAROXETINE 20 MG: 20 TABLET, FILM COATED ORAL at 22:05

## 2019-07-23 RX ADMIN — SODIUM CHLORIDE 8 MG/HR: 9 INJECTION, SOLUTION INTRAVENOUS at 01:05

## 2019-07-23 RX ADMIN — ZOLPIDEM TARTRATE 5 MG: 5 TABLET, FILM COATED ORAL at 22:05

## 2019-07-23 RX ADMIN — POTASSIUM CHLORIDE 20 MEQ: 200 INJECTION, SOLUTION INTRAVENOUS at 08:57

## 2019-07-23 RX ADMIN — DEXTROSE AND SODIUM CHLORIDE 75 ML/HR: 5; .45 INJECTION, SOLUTION INTRAVENOUS at 03:39

## 2019-07-23 RX ADMIN — DEXTROSE AND SODIUM CHLORIDE 75 ML/HR: 5; .45 INJECTION, SOLUTION INTRAVENOUS at 16:53

## 2019-07-23 RX ADMIN — PANTOPRAZOLE SODIUM 40 MG: 40 TABLET, DELAYED RELEASE ORAL at 15:42

## 2019-07-23 RX ADMIN — ATORVASTATIN CALCIUM 10 MG: 10 TABLET, FILM COATED ORAL at 22:05

## 2019-07-23 NOTE — PROGRESS NOTES
Progress Note - General Surgery   Adriana Chaudhari 69 y o  female MRN: [id number]  Unit/Bed#: 2 [place] [address] Encounter: [id number]      Assessment:  · coffee ground emesis - original complaint, no hematemesis today  · Type 3 hiatal hernia with Donn's ulcers -- revealed on EGD on 7/22/19, no active bleeding identified, patient has been aware of her hiatal hernia for over 10 years but recently has struggled with worsening reflux symptoms  · Anemia -- Hgb 9 8 on admission, down to 7 4 yesterday, s/p transfusion 1 unit on 7/22, Hgb 8 9 this AM    Plan:  · Patient to follow up with bariatric surgery as an outpatient to discuss surgical repair of hiatal hernia  · Recommend esophageal manometry as an outpatient  · Advance diet as tolerated  · Surgical team will sign off at this time  Please feel free to reach out to us with any further questions        Subjective/Objective     Subjective:  She continues to have episodes of diarrhea today, the last 1 being this morning  She denies any nausea, hematemesis, hemoptysis, melena  Patient states she has known about the hiatal hernia for over 10 years now and did discuss it with Dr Sherie Barnes in the past, however lately her reflux symptoms have been worsening in severity and length  She cannot identify 1 certain type of food that is causing her symptoms, it happens randomly and more often recently  Denies any SOB or difficulty breathing  Objective:     Blood pressure 152/69, pulse 61, temperature 98 4 °F (36 9 °C), temperature source Oral, resp  rate 16, height 5' 7" (1 702 m), weight 83 9 kg (185 lb), SpO2 95 %, not currently breastfeeding  ,Body mass index is 28 98 kg/m²        Intake/Output Summary (Last 24 hours) at 7/23/2019 1127  Last data filed at 7/23/2019 0601  Gross per 24 hour   Intake 1177 5 ml   Output    Net 1177 5 ml       Invasive Devices     Peripheral Intravenous Line            Peripheral IV 07/21/19 Right Antecubital 2 days    Peripheral IV 07/23/19 Right Hand less than 1 day                Physical Exam: /69 (BP Location: Left arm)   Pulse 61   Temp 98 4 °F (36 9 °C) (Oral)   Resp 16   Ht 5' 7" (1 702 m)   Wt 83 9 kg (185 lb)   SpO2 95%   Breastfeeding? No   BMI 28 98 kg/m²   General appearance: alert and oriented, in no acute distress  Lungs: clear to auscultation bilaterally  Heart: regular rate and rhythm  Extremities: extremities normal, warm and well-perfused; no cyanosis, clubbing, or edema    Lab, Imaging and other studies:  I have personally reviewed pertinent lab results    , CBC:   Lab Results   Component Value Date    WBC 10 74 (H) 07/23/2019    HGB 8 9 (L) 07/23/2019    HCT 30 6 (L) 07/23/2019    MCV 79 (L) 07/23/2019     (H) 07/23/2019    MCH 23 0 (L) 07/23/2019    MCHC 29 1 (L) 07/23/2019    RDW 20 6 (H) 07/23/2019    MPV 10 1 07/23/2019    NRBC 0 07/23/2019   , CMP:   Lab Results   Component Value Date    SODIUM 141 07/23/2019    K 3 0 (L) 07/23/2019     07/23/2019    CO2 30 07/23/2019    BUN 4 (L) 07/23/2019    CREATININE 0 64 07/23/2019    CALCIUM 8 2 (L) 07/23/2019    EGFR 89 07/23/2019     VTE Pharmacologic Prophylaxis: Reason for no pharmacologic prophylaxis GI ulcers  VTE Mechanical Prophylaxis: sequential compression device

## 2019-07-23 NOTE — PROGRESS NOTES
07/23/19 [address] [place] [address] of Residence [place]    Readmission   Readmission Codes 7A11   Patient Information   Mental Status Alert   Primary Caregiver Self   Support System Immediate family   Legal Information   Advance Directives Living will;Power of  for health care   Advance Directives Status Discussed - Information Provided   Activities of Daily Living Prior to Admission   Functional Status Independent   Assistive Device No device needed   Living Arrangement House   Ambulation Independent   Means of Transportation   Means of Transport to Appts: Drive Self     LOS/Readmit/MBP: LOS is 3 days today, pt is a readmit with GI bleeding  Pt has no MBP at this time  Lives with her dtr in multilevel home and has 4 SLIME  BR on 2nd floor but is accessible  Pt has no DME and has no oxygen and no nebs  Pt has no HHC  PCP: Dr Zen Vargas  Pt has no advanced directives, but has paperwork that had been given to her  She has not been able to complete them yet  Pt does drive, and is independent with her own care  Pharmacy is [facility], pt notes no issues with prescription plan    Pt denies any DCP needs at this time

## 2019-07-23 NOTE — PROGRESS NOTES
Progress Note - Lee Median 69 y o  female MRN: [id number]    Unit/Bed#: [address] [place] [address] Encounter: [id number]    Assessment and Plan:   Principal Problem:    Upper GI bleed  Active Problems:    Anemia    Benign essential hypertension    Asymptomatic stenosis of left carotid artery    Hyperlipidemia    Asthma    Depression    S/P DANNY-BSO (total abdominal hysterectomy and bilateral salpingo-oophorectomy)    Large hiatal hernia with possible volvulus    #1  Large hiatal hernia with Donn's uclers and anemia: likely chronic blood loss from Donn's ulcers  No active bleeding on EGD yesterday  Has large hiatal hernia with paraesophageal component  No volvulus or obstruction on EGD  -Recommend surgical repair of hernia  -Continue BID PPI for now  -Monitor hgb  -Follow up Upper GI Series ordered by surgery    -Patient likely will need manometry study prior to repair which can be done as an outpatient  -Diet as tolerated  ----------------------------------------------------------------------------------------------------------------    Subjective:     Patient doing well  No further vomiting  NPO today for barium swallow study    Objective:     Vitals: Blood pressure 152/69, pulse 61, temperature 98 4 °F (36 9 °C), temperature source Oral, resp  rate 16, height 5' 7" (1 702 m), weight 83 9 kg (185 lb), SpO2 95 %, not currently breastfeeding  ,Body mass index is 28 98 kg/m²        Intake/Output Summary (Last 24 hours) at 7/23/2019 1033  Last data filed at 7/23/2019 0601  Gross per 24 hour   Intake 1177 5 ml   Output    Net 1177 5 ml       Physical Exam:     General Appearance: Alert, appears stated age and cooperative  Lungs: Clear to auscultation bilaterally, no rales or rhonchi, no labored breathing/accessory muscle use  Heart: Regular rate and rhythm, S1, S2 normal, no murmur, click, rub or gallop  Abdomen: Soft, non-tender, non-distended; bowel sounds normal; no masses or no organomegaly  Extremities: No cyanosis, clubbing, or edema    Invasive Devices     Peripheral Intravenous Line            Peripheral IV 07/21/19 Right Antecubital 2 days    Peripheral IV 07/23/19 Right Hand less than 1 day                Lab Results:  Results from last 7 days   Lab Units 07/23/19  0443   WBC Thousand/uL 10 74*   HEMOGLOBIN g/dL 8 9*   HEMATOCRIT % 30 6*   PLATELETS Thousands/uL 480*   NEUTROS PCT % 74   LYMPHS PCT % 12*   MONOS PCT % 7   EOS PCT % 5     Results from last 7 days   Lab Units 07/23/19  0443 07/22/19  0808   POTASSIUM mmol/L 3 0* 3 7   CHLORIDE mmol/L 104 106   CO2 mmol/L 30 29   BUN mg/dL 4* 7   CREATININE mg/dL 0 64 0 69   CALCIUM mg/dL 8 2* 8 0*   ALK PHOS U/L  --  135*   ALT U/L  --  18   AST U/L  --  13         Results from last 7 days   Lab Units 07/21/19  0229   LIPASE u/L 128       Imaging Studies: I have personally reviewed pertinent imaging studies  Ct Abdomen Pelvis Wo Contrast    Result Date: 7/21/2019  Impression: 1   3   Large hiatal hernia with combined organoaxial and mesenteroaxial volvulus  No significant wall thickening or surrounding inflammatory changes to suggest strangulation  In the setting of coffee-ground emesis, endoscopy should be considered  2   Postsurgical changes from hysterectomy  Small amount of free pelvic fluid probably within normal limits for recent surgery  No encapsulated collections  3   Small bilobed acute collection in the anterior subcutaneous tissues at the patient's low transverse incision  Lack of surrounding inflammation speaks against abscess  This could represent small seroma or hematoma  The study was marked in EPIC for significant notification  Workstation performed: [id number]     Xr Abdomen Obstruction Series    Result Date: 7/21/2019  Impression: 1  Large hiatal hernia with an air-fluid level  2   Scattered mildly dilated small bowel loops with air-fluid levels  Recommend CT scan of the abdomen and pelvis to exclude obstruction   Workstation performed: [id number]

## 2019-07-23 NOTE — PROGRESS NOTES
Progress Note - Diallo Juarez 1945, 69 y o  female MRN: [id number]    Unit/Bed#: [address] [place] [address] Encounter: [id number]    Primary Care Provider: Javier Sloan MD   Date and time admitted to hospital: 7/21/2019  2:28 AM        Large hiatal hernia with possible volvulus  Assessment & Plan  Presented with nausea vomiting  Status post TAHBSO on 07/10, requiring intraoperative surgical concern for extensive lysis of adhesion complicated by serosal tear and mesenteric defect which was corrected  Noted to have leukocytosis  Obstructive series concerning for air-fluid level in hiatal hernia and possible small-bowel obstruction  CT of the abdomen-evidence of large hiatal hernia with evidence of volvulus  No evidence of strangulation  Small collection noted along with low transverse abdominal incision, likely seroma or hematoma  EGD showed a large hiatal hernia  · GI and surgery is following  · Cont PPI BID  · Advance diet as tolerated  · Will require OP surgical repair of hernia   Follow up surgery after DC    Hypertension  Assessment & Plan  Hold norvasc, continue atenolol    S/P DANNY-BSO (total abdominal hysterectomy and bilateral salpingo-oophorectomy)  Assessment & Plan  Status post TAHBSO on 07/10, requiring intraoperative surgical concern for extensive lysis of adhesion complicated by serosal tear and mesenteric defect which was corrected  Biopsy revealed presence of endometrial carcinoma  · Patient is awaiting follow-up OP with gyn Oncology in August      Depression  Assessment & Plan  Continue paxil     Asthma  Assessment & Plan  Stable  Albuterol PRN    Hyperlipidemia  Assessment & Plan  Continue statin    Asymptomatic stenosis of left carotid artery  Assessment & Plan  Holding aspirin given acute GI bleed, can start ASA on discharge  Continue statin    Benign essential hypertension  Assessment & Plan  Continue atenolol  BP above goal, restart Norvasc today  Monitor    Anemia  Assessment & Plan  Acute on chronic secondary to acute blood loss  Hg dropped to 7 4  S/p 1 unit PRBC  · Monitor H&H, transfuse as needed    * Upper GI bleed  Assessment & Plan  Presented with Nausea vomiting and coffee-ground emesis  Denies melena, black tarry stool  BUN within normal limit  Presented to hemoglobin at 9 8 grams/deciliters, down to 7 4 grams/deciliter  Status post 1 PRBC on  with appropriate improvement  EGD showed large hiatal hernia with francisco ulcers  · GI following  · Changed protonix gtt to PO PPI BID  · Upper GI series done today, results pending  · Requires OP esophageal manometry and hernia repair surgery        VTE Pharmacologic Prophylaxis:   Pharmacologic: none due to GIB  Mechanical VTE Prophylaxis in Place: Yes    Patient Centered Rounds: I have performed bedside rounds with nursing staff today  Discussions with Specialists or Other Care Team Provider: Yes  Education and Discussions with Family / Patient:Yes  Time Spent for Care: 45 minutes  More than 50% of total time spent on counseling and coordination of care as described above  Current Length of Stay: 2 day(s)  Current Patient Status: Inpatient     Discharge Plan: tomorrow if tolerated diet and H/h stable    Code Status: Level 1 - Full Code      Subjective:   Feels fine this morning  No complaints  No further emesis  Objective:     Vitals:   Temp (24hrs), Av 4 °F (36 9 °C), Min:97 7 °F (36 5 °C), Max:99 3 °F (37 4 °C)    Temp:  [97 7 °F (36 5 °C)-99 3 °F (37 4 °C)] 98 4 °F (36 9 °C)  HR:  [61-81] 61  Resp:  [16-18] 16  BP: (135-159)/(65-89) 152/69  SpO2:  [92 %-99 %] 95 %  Body mass index is 28 98 kg/m²  Input and Output Summary (last 24 hours): Intake/Output Summary (Last 24 hours) at 2019 2965  Last data filed at 2019 0601  Gross per 24 hour   Intake 1177 5 ml   Output    Net 1177 5 ml        Physical Exam:     Physical Exam   Constitutional: She is oriented to person, place, and time  She appears well-developed   No distress  HENT:   Head: Normocephalic and atraumatic  Cardiovascular: Normal rate, regular rhythm and normal heart sounds  Pulmonary/Chest: Effort normal and breath sounds normal  No respiratory distress  She has no wheezes  She has no rales  Abdominal: Soft  Bowel sounds are normal  She exhibits no distension  There is tenderness  There is no rebound and no guarding  Right side abdomen TTP, mild   Musculoskeletal: She exhibits no edema, tenderness or deformity  Neurological: She is alert and oriented to person, place, and time  Skin: Skin is warm and dry  Psychiatric: She has a normal mood and affect  Her behavior is normal    Nursing note and vitals reviewed  Additional Data:     Labs:    Results from last 7 days   Lab Units 07/23/19  0443 07/22/19  1525 07/22/19  0808  07/21/19  0619 07/21/19  0229   WBC Thousand/uL 10 74*  --  12 69*  --  16 54* 13 56*   HEMOGLOBIN g/dL 8 9* 9 7* 9 3*   < > 8 4* 9 8*   HEMATOCRIT % 30 6*  --  31 6*  --  29 9* 34 5*   PLATELETS Thousands/uL 480*  --  452*  --  505* 640*   NEUTROS PCT % 74  --  79*  --   --  82*    < > = values in this interval not displayed  Results from last 7 days   Lab Units 07/23/19  0443 07/22/19  0808 07/21/19  0619 07/21/19  0229   SODIUM mmol/L 141 142 139 138   POTASSIUM mmol/L 3 0* 3 7 4 3 3 5   CHLORIDE mmol/L 104 106 104 98*   CO2 mmol/L 30 29 28 27   BUN mg/dL 4* 7 12 13   CREATININE mg/dL 0 64 0 69 0 54* 0 83   CALCIUM mg/dL 8 2* 8 0* 9 0 9 0   TOTAL BILIRUBIN mg/dL  --  0 60  --  0 60   ALK PHOS U/L  --  135*  --  172*   ALT U/L  --  18  --  25   AST U/L  --  13  --  16             Lab Results   Component Value Date/Time    HGBA1C 5 8 06/26/2019 10:42 AM    HGBA1C 5 8 (H) 10/18/2016 07:56 AM               * I Have Reviewed All Lab Data Listed Above  * Additional Pertinent Lab Tests Reviewed:  All Labs Within Last 24 Hours Reviewed    Imaging:     CT abdomen pelvis wo contrast   Final Result by Maynor Yuan MD (07/21 4174)      1   3   Large hiatal hernia with combined organoaxial and mesenteroaxial volvulus  No significant wall thickening or surrounding inflammatory changes to suggest strangulation  In the setting of coffee-ground emesis, endoscopy should be considered  2   Postsurgical changes from hysterectomy  Small amount of free pelvic fluid probably within normal limits for recent surgery  No encapsulated collections  3   Small bilobed acute collection in the anterior subcutaneous tissues at the patient's low transverse incision  Lack of surrounding inflammation speaks against abscess  This could represent small seroma or hematoma  The study was marked in EPIC for significant notification  Workstation performed: [id number]         XR abdomen obstruction series   Final Result by Kamilah Gibbs MD (07/21 0520)         1  Large hiatal hernia with an air-fluid level  2   Scattered mildly dilated small bowel loops with air-fluid levels  Recommend CT scan of the abdomen and pelvis to exclude obstruction  Workstation performed: [id number]         FL upper GI UGI    (Results Pending)     Imaging Reports Reviewed by myself    Cultures:   Blood Culture:   Lab Results   Component Value Date    BLOODCX No Growth After 5 Days  12/03/2016    BLOODCX No Growth After 5 Days   12/03/2016     Urine Culture:   Lab Results   Component Value Date    URINECX No Growth <1000 cfu/mL 12/03/2016    URINECX 10,000-19,000 cfu/ml Mixed Contaminants X2 12/01/2016     Sputum Culture: No components found for: SPUTUMCX  Wound Culture: No results found for: WOUNDCULT    Last 24 Hours Medication List:     Current Facility-Administered Medications:  acetaminophen 650 mg Oral Q6H PRN Jai Palmer MD    atenolol 25 mg Oral HS Jai Palmer MD    atorvastatin 10 mg Oral HS Jai Palmer MD    dextrose 5 % and sodium chloride 0 45 % 75 mL/hr Intravenous Continuous Jai Palmer MD Last Rate: 75 mL/hr (07/23/19 0807) ondansetron 4 mg Intravenous Q6H PRN Jad Solomon MD    pantoprazole 40 mg Oral BID AC Adore Solis PA-C    PARoxetine 20 mg Oral HS Jad Solomon MD    potassium chloride 20 mEq Intravenous Once Emiliano Hardin MD Last Rate: 20 mEq (07/23/19 0857)   Followed by        potassium chloride 20 mEq Intravenous Once Emiliano Hardin MD    zolpidem 5 mg Oral HS PRN Emiliano Hardin MD         Today, Patient Was Seen By: Emiliano Hardin MD    ** Please Note: Dragon 360 Dictation voice to text software may have been used in the creation of this document   **

## 2019-07-23 NOTE — DISCHARGE INSTRUCTIONS
Hiatal Hernia   WHAT YOU NEED TO KNOW:   A hiatal hernia is a condition that causes part of your stomach to bulge through the hiatus (small opening) in your diaphragm  The part of the stomach may move up and down, or it may get trapped above the diaphragm  DISCHARGE INSTRUCTIONS:   Seek care immediately if:   · You have severe abdominal pain  · You try to vomit but nothing comes out (retching)  · You have severe chest pain and sudden trouble breathing  · Your bowel movements are black or bloody  · Your vomit looks like coffee grounds or has blood in it  Contact your healthcare provider if:   · Your symptoms are getting worse  · You have nausea, and you are vomiting  · You are losing weight without trying  · You have questions or concerns about your condition or care  Medicines:   · Medicines  may be given to relieve heartburn symptoms  These medicines help to decrease or block stomach acid  You may also be given medicines that help to tighten the esophageal sphincter  · Take your medicine as directed  Contact your healthcare provider if you think your medicine is not helping or if you have side effects  Tell him or her if you are allergic to any medicine  Keep a list of the medicines, vitamins, and herbs you take  Include the amounts, and when and why you take them  Bring the list or the pill bottles to follow-up visits  Carry your medicine list with you in case of an emergency  Follow up with your healthcare provider as directed:  Write down your questions so you remember to ask them during your visits  Self care:   · Avoid foods that make your symptoms worse  These may include spicy foods, fruit juices, alcohol, caffeine, chocolate, and mint  · Eat several small meals during the day  Small meals give your stomach less food to digest     · Avoid lying down and bending forward after you eat  Do not eat meals 2 to 3 hours before bedtime   This decreases your risk for reflux  · Maintain a healthy weight  If you are overweight, weight loss may help relieve your symptoms  · Sleep with your head elevated  at least 6 inches  · Do not smoke  Smoking can increase your symptoms of heartburn  © 2017 [employer] Information is for End User's use only and may not be sold, redistributed or otherwise used for commercial purposes  All illustrations and images included in CareNotes® are the copyrighted property of [employer]  or [employer] Betanc[employer]  The above information is an  only  It is not intended as medical advice for individual conditions or treatments  Talk to your doctor, nurse or pharmacist before following any medical regimen to see if it is safe and effective for you

## 2019-07-24 ENCOUNTER — TRANSITIONAL CARE MANAGEMENT (OUTPATIENT)
Dept: FAMILY MEDICINE CLINIC | Facility: CLINIC | Age: 74
End: 2019-07-24

## 2019-07-24 VITALS
WEIGHT: 185 LBS | HEIGHT: 67 IN | SYSTOLIC BLOOD PRESSURE: 154 MMHG | RESPIRATION RATE: 17 BRPM | DIASTOLIC BLOOD PRESSURE: 71 MMHG | BODY MASS INDEX: 29.03 KG/M2 | HEART RATE: 69 BPM | TEMPERATURE: 97.9 F | OXYGEN SATURATION: 95 %

## 2019-07-24 PROBLEM — R06.02 EXERTIONAL SHORTNESS OF BREATH: Status: RESOLVED | Noted: 2019-06-06 | Resolved: 2019-07-24

## 2019-07-24 PROBLEM — K92.2 UPPER GI BLEED: Status: RESOLVED | Noted: 2019-07-21 | Resolved: 2019-07-24

## 2019-07-24 PROBLEM — Z48.816 AFTERCARE FOLLOWING SURGERY OF THE GENITOURINARY SYSTEM: Status: RESOLVED | Noted: 2019-07-17 | Resolved: 2019-07-24

## 2019-07-24 LAB
ANION GAP SERPL CALCULATED.3IONS-SCNC: 10 MMOL/L (ref 4–13)
BASOPHILS # BLD AUTO: 0.06 THOUSANDS/ΜL (ref 0–0.1)
BASOPHILS NFR BLD AUTO: 1 % (ref 0–1)
BUN SERPL-MCNC: 3 MG/DL (ref 5–25)
CALCIUM SERPL-MCNC: 8.6 MG/DL (ref 8.3–10.1)
CHLORIDE SERPL-SCNC: 104 MMOL/L (ref 100–108)
CO2 SERPL-SCNC: 25 MMOL/L (ref 21–32)
CREAT SERPL-MCNC: 0.59 MG/DL (ref 0.6–1.3)
EOSINOPHIL # BLD AUTO: 0.4 THOUSAND/ΜL (ref 0–0.61)
EOSINOPHIL NFR BLD AUTO: 4 % (ref 0–6)
ERYTHROCYTE [DISTWIDTH] IN BLOOD BY AUTOMATED COUNT: 21.3 % (ref 11.6–15.1)
GFR SERPL CREATININE-BSD FRML MDRD: 92 ML/MIN/1.73SQ M
GLUCOSE SERPL-MCNC: 96 MG/DL (ref 65–140)
HCT VFR BLD AUTO: 32.7 % (ref 34.8–46.1)
HGB BLD-MCNC: 9.4 G/DL (ref 11.5–15.4)
IMM GRANULOCYTES # BLD AUTO: 0.04 THOUSAND/UL (ref 0–0.2)
IMM GRANULOCYTES NFR BLD AUTO: 0 % (ref 0–2)
LYMPHOCYTES # BLD AUTO: 1.24 THOUSANDS/ΜL (ref 0.6–4.47)
LYMPHOCYTES NFR BLD AUTO: 13 % (ref 14–44)
MAGNESIUM SERPL-MCNC: 2.1 MG/DL (ref 1.6–2.6)
MCH RBC QN AUTO: 23.3 PG (ref 26.8–34.3)
MCHC RBC AUTO-ENTMCNC: 28.7 G/DL (ref 31.4–37.4)
MCV RBC AUTO: 81 FL (ref 82–98)
MONOCYTES # BLD AUTO: 0.76 THOUSAND/ΜL (ref 0.17–1.22)
MONOCYTES NFR BLD AUTO: 8 % (ref 4–12)
NEUTROPHILS # BLD AUTO: 6.95 THOUSANDS/ΜL (ref 1.85–7.62)
NEUTS SEG NFR BLD AUTO: 74 % (ref 43–75)
NRBC BLD AUTO-RTO: 0 /100 WBCS
PLATELET # BLD AUTO: 543 THOUSANDS/UL (ref 149–390)
PMV BLD AUTO: 10 FL (ref 8.9–12.7)
POTASSIUM SERPL-SCNC: 3.9 MMOL/L (ref 3.5–5.3)
RBC # BLD AUTO: 4.04 MILLION/UL (ref 3.81–5.12)
SODIUM SERPL-SCNC: 139 MMOL/L (ref 136–145)
WBC # BLD AUTO: 9.45 THOUSAND/UL (ref 4.31–10.16)

## 2019-07-24 PROCEDURE — 83735 ASSAY OF MAGNESIUM: CPT | Performed by: STUDENT IN AN ORGANIZED HEALTH CARE EDUCATION/TRAINING PROGRAM

## 2019-07-24 PROCEDURE — 85025 COMPLETE CBC W/AUTO DIFF WBC: CPT | Performed by: INTERNAL MEDICINE

## 2019-07-24 PROCEDURE — 80048 BASIC METABOLIC PNL TOTAL CA: CPT | Performed by: INTERNAL MEDICINE

## 2019-07-24 PROCEDURE — 99239 HOSP IP/OBS DSCHRG MGMT >30: CPT | Performed by: STUDENT IN AN ORGANIZED HEALTH CARE EDUCATION/TRAINING PROGRAM

## 2019-07-24 RX ADMIN — PANTOPRAZOLE SODIUM 40 MG: 40 TABLET, DELAYED RELEASE ORAL at 06:36

## 2019-07-24 RX ADMIN — DEXTROSE AND SODIUM CHLORIDE 75 ML/HR: 5; .45 INJECTION, SOLUTION INTRAVENOUS at 06:35

## 2019-07-24 RX ADMIN — AMLODIPINE BESYLATE 2.5 MG: 2.5 TABLET ORAL at 08:54

## 2019-07-24 NOTE — DISCHARGE SUMMARY
Discharge- Adron Gain 1945, 69 y o  female MRN: [id number]    Unit/Bed#: [address] [place] [address] Encounter: [id number]    Primary Care Provider: Willi Sands MD   Date and time admitted to hospital: 7/21/2019  2:28 AM        Large hiatal hernia with possible volvulus  Assessment & Plan  Presented with nausea vomiting  Status post TAHBSO on 07/10, requiring intraoperative surgical concern for extensive lysis of adhesion complicated by serosal tear and mesenteric defect which was corrected  Noted to have leukocytosis  Obstructive series concerning for air-fluid level in hiatal hernia and possible small-bowel obstruction  CT of the abdomen-evidence of large hiatal hernia with evidence of volvulus  No evidence of strangulation  Small collection noted along with low transverse abdominal incision, likely seroma or hematoma  EGD showed a large hiatal hernia  · GI and surgery is following  · Cont PPI BID  · Advanced diet without issue  · Will require OP surgical repair of hernia  Follow up surgery after DC  · Will require esophageal manometry and follow up with surgery after discharge    S/P DANNY-BSO (total abdominal hysterectomy and bilateral salpingo-oophorectomy)  Assessment & Plan  Status post TAHBSO on 07/10, requiring intraoperative surgical concern for extensive lysis of adhesion complicated by serosal tear and mesenteric defect which was corrected  Biopsy revealed presence of endometrial carcinoma  · Patient is awaiting follow-up OP with gyn Oncology in August      Depression  Assessment & Plan  Continue paxil     Asthma  Assessment & Plan  Stable  Albuterol PRN    Asymptomatic stenosis of left carotid artery  Assessment & Plan  Initially held aspirin given acute GI bleed,  Per GI restarted ASA on discharge  Continue statin    Benign essential hypertension  Assessment & Plan  Continue atenolol, norvasc    Anemia  Assessment & Plan  Acute on chronic secondary to acute blood loss  Hg dropped to 7 4   S/p 1 unit PRBC  After transfusion Hg stabilized in the 9 range with no further evidence of bleeding    * Upper GI bleedresolved as of 7/24/2019  Assessment & Plan  Presented with Nausea vomiting and coffee-ground emesis  Denies melena, black tarry stool  BUN within normal limit  Presented to hemoglobin at 9 8 grams/deciliters, down to 7 4 grams/deciliter  Status post 1 PRBC on 07/21 with appropriate improvement  EGD showed large hiatal hernia with donn ulcers  · GI following  · Initially placed on Protonix drip then change to Protonix p o  B i d  · Upper GI series done as part of workup, results pending at time of discharge  · Requires OP esophageal manometry and hernia repair surgery  · She will follow up with GI and surgery after discharge          Discharging Physician / Practitioner: Clarence Davila MD  PCP: Noris Valenzuela MD  Admission Date: 7/21/2019  Discharge Date: 07/24/19    Reason for Admission: Vomiting Blood (pt reports multiple episodes of vomiting since midnight, brown liquid emesis  hysterectomy done 7/10 adhesions found on intestine  skin cool clammy pale  denies chest pain or sob  denies black or bloody stools + loose stools)        Resolved Problems  Date Reviewed: 7/21/2019          Resolved    * (Principal) Upper GI bleed 7/24/2019     Resolved by  Clarence Davila MD          Consultations During Hospital Stay:  IP CONSULT TO GASTROENTEROLOGY  IP CONSULT TO ACUTE CARE SURGERY    Procedures Performed:     · EGD 7/22/19:  Normal duodenum and GE junction  Hiatal hernia with Donn's ulcers  Paraesophageal and sliding component to hernia  No volvulus    No active GI bleeding    Significant Findings / Test Results:     · Upper GI done on 07/23:  Results pending    Results from last 7 days   Lab Units 07/24/19  0644 07/23/19  0443 07/22/19  1525 07/22/19  0808   WBC Thousand/uL 9 45 10 74*  --  12 69*   HEMOGLOBIN g/dL 9 4* 8 9* 9 7* 9 3*   PLATELETS Thousands/uL 543* 480*  --  452*     Results from last 7 days   Lab Units 07/24/19  0501 07/23/19  0443 07/22/19  0808  07/21/19  0229   SODIUM mmol/L 139 141 142   < > 138   POTASSIUM mmol/L 3 9 3 0* 3 7   < > 3 5   CHLORIDE mmol/L 104 104 106   < > 98*   CO2 mmol/L 25 30 29   < > 27   BUN mg/dL 3* 4* 7   < > 13   CREATININE mg/dL 0 59* 0 64 0 69   < > 0 83   CALCIUM mg/dL 8 6 8 2* 8 0*   < > 9 0   TOTAL BILIRUBIN mg/dL  --   --  0 60  --  0 60   ALK PHOS U/L  --   --  135*  --  172*   ALT U/L  --   --  18  --  25   AST U/L  --   --  13  --  16    < > = values in this interval not displayed  Lab Results   Component Value Date/Time    HGBA1C 5 8 06/26/2019 10:42 AM    HGBA1C 5 8 (H) 10/18/2016 07:56 AM             Blood Culture:   Lab Results   Component Value Date    BLOODCX No Growth After 5 Days  12/03/2016    BLOODCX No Growth After 5 Days  12/03/2016     Urine Culture:   Lab Results   Component Value Date    URINECX No Growth <1000 cfu/mL 12/03/2016    URINECX 10,000-19,000 cfu/ml Mixed Contaminants X2 12/01/2016     Sputum Culture: No components found for: SPUTUMCX  Wound Culture: No results found for: WOUNDCULT     CT abdomen pelvis wo contrast   Final Result by Javad Guzman MD (07/21 4020)      1   3   Large hiatal hernia with combined organoaxial and mesenteroaxial volvulus  No significant wall thickening or surrounding inflammatory changes to suggest strangulation  In the setting of coffee-ground emesis, endoscopy should be considered  2   Postsurgical changes from hysterectomy  Small amount of free pelvic fluid probably within normal limits for recent surgery  No encapsulated collections  3   Small bilobed acute collection in the anterior subcutaneous tissues at the patient's low transverse incision  Lack of surrounding inflammation speaks against abscess  This could represent small seroma or hematoma  The study was marked in EPIC for significant notification              Workstation performed: [id number]         XR abdomen obstruction series   Final Result by Ravi Newton MD (07/21 4540)         1  Large hiatal hernia with an air-fluid level  2   Scattered mildly dilated small bowel loops with air-fluid levels  Recommend CT scan of the abdomen and pelvis to exclude obstruction  Workstation performed: [id number]         FL upper GI UGI    (Results Pending)          Incidental Findings:   ·      Test Results Pending at Discharge (will require follow up):   · As noted above     Outpatient Tests Requested:  · Esophageal manometry    Complications:  none    Reason for Admission:   Chief Complaint   Patient presents with    Vomiting Blood     pt reports multiple episodes of vomiting since midnight, brown liquid emesis  hysterectomy done 7/10 adhesions found on intestine  skin cool clammy pale  denies chest pain or sob  denies black or bloody stools + loose stools       Hospital Course:     Johan Palma is a 69 y o  female patient with a PMH of hypertension, hyperlipidemia, chronic anemia, left carotid artery stenosis, asthma, history of hysterectomy, who originally presented to the hospital on 7/21/2019 due to coffee-ground emesis and generalized feeling poorly  Please see above list of diagnoses and related plan for additional information  Condition at Discharge: good       Discharge Day Visit / Exam:     Subjective:  Feels good  Tolerating diet  Wants to go home  Understands her follow-up  Family at bedside  Vitals: Blood Pressure: 154/71 (07/24/19 0850)  Pulse: 69 (07/24/19 0850)  Temperature: 97 9 °F (36 6 °C) (07/24/19 0850)  Temp Source: Oral (07/24/19 0850)  Respirations: 17 (07/24/19 0850)  Height: 5' 7" (170 2 cm) (07/21/19 0220)  Weight - Scale: 83 9 kg (185 lb) (07/21/19 0220)  SpO2: 95 % (07/24/19 0850)  Exam:   Physical Exam   Constitutional: She is oriented to person, place, and time  She appears well-developed  No distress  HENT:   Head: Normocephalic and atraumatic     Cardiovascular: Normal rate, regular rhythm and normal heart sounds  Pulmonary/Chest: Effort normal and breath sounds normal  No respiratory distress  She has no wheezes  She has no rales  Abdominal: Soft  Bowel sounds are normal  She exhibits no distension  There is no tenderness  There is no rebound  Neurological: She is alert and oriented to person, place, and time  Skin: Skin is warm and dry  Psychiatric: She has a normal mood and affect  Her behavior is normal    Nursing note and vitals reviewed  Discharge instructions/Information to patient and family:   See after visit summary for information provided to patient and family  Provisions for Follow-Up Care:  See after visit summary for information related to follow-up care and any pertinent home health orders  Disposition:     Home    Planned Readmission: no     Discharge Statement:  I spent >30 minutes discharging the patient  This time was spent on the day of discharge  I had direct contact with the patient on the day of discharge  Greater than 50% of the total time was spent examining patient, answering all patient questions, arranging and discussing plan of care with patient as well as directly providing post-discharge instructions  Additional time then spent on discharge activities  Discharge Medications:  See after visit summary for reconciled discharge medications provided to patient and family        ** Please Note: This note has been constructed using a voice recognition system **

## 2019-07-24 NOTE — ASSESSMENT & PLAN NOTE
Presented with Nausea vomiting and coffee-ground emesis  Denies melena, black tarry stool  BUN within normal limit  Presented to hemoglobin at 9 8 grams/deciliters, down to 7 4 grams/deciliter  Status post 1 PRBC on 07/21 with appropriate improvement  EGD showed large hiatal hernia with francisco ulcers  · GI following  · Initially placed on Protonix drip then change to Protonix p o  B i d    · Upper GI series done as part of workup, results pending at time of discharge  · Requires OP esophageal manometry and hernia repair surgery  · She will follow up with GI and surgery after discharge

## 2019-07-24 NOTE — ASSESSMENT & PLAN NOTE
Acute on chronic secondary to acute blood loss  Hg dropped to 7 4   S/p 1 unit PRBC  After transfusion Hg stabilized in the 9 range with no further evidence of bleeding

## 2019-07-24 NOTE — NURSING NOTE
Patient left ambulatory to home, gait steady  IV access removed prior to discharge  Discharge instructions reviewed with patient and daughter  Both verbalized understanding  All personal belongings taken with patient

## 2019-07-24 NOTE — ASSESSMENT & PLAN NOTE
Presented with nausea vomiting  Status post TAHBSO on 07/10, requiring intraoperative surgical concern for extensive lysis of adhesion complicated by serosal tear and mesenteric defect which was corrected  Noted to have leukocytosis  Obstructive series concerning for air-fluid level in hiatal hernia and possible small-bowel obstruction  CT of the abdomen-evidence of large hiatal hernia with evidence of volvulus  No evidence of strangulation  Small collection noted along with low transverse abdominal incision, likely seroma or hematoma  EGD showed a large hiatal hernia  · GI and surgery is following  · Cont PPI BID  · Advanced diet without issue  · Will require OP surgical repair of hernia   Follow up surgery after DC  · Will require esophageal manometry and follow up with surgery after discharge

## 2019-07-26 ENCOUNTER — TELEPHONE (OUTPATIENT)
Dept: OBGYN CLINIC | Facility: CLINIC | Age: 74
End: 2019-07-26

## 2019-07-26 ENCOUNTER — OFFICE VISIT (OUTPATIENT)
Dept: FAMILY MEDICINE CLINIC | Facility: CLINIC | Age: 74
End: 2019-07-26
Payer: MEDICARE

## 2019-07-26 VITALS
DIASTOLIC BLOOD PRESSURE: 70 MMHG | BODY MASS INDEX: 29.03 KG/M2 | WEIGHT: 185 LBS | SYSTOLIC BLOOD PRESSURE: 122 MMHG | RESPIRATION RATE: 18 BRPM | HEART RATE: 74 BPM | TEMPERATURE: 98.4 F | HEIGHT: 67 IN

## 2019-07-26 DIAGNOSIS — C54.1 ENDOMETRIAL CANCER (HCC): ICD-10-CM

## 2019-07-26 DIAGNOSIS — F32.A DEPRESSION, UNSPECIFIED DEPRESSION TYPE: ICD-10-CM

## 2019-07-26 DIAGNOSIS — I10 BENIGN ESSENTIAL HYPERTENSION: ICD-10-CM

## 2019-07-26 DIAGNOSIS — K44.9 HIATAL HERNIA: Primary | ICD-10-CM

## 2019-07-26 DIAGNOSIS — E78.2 MIXED HYPERLIPIDEMIA: ICD-10-CM

## 2019-07-26 PROCEDURE — 99496 TRANSJ CARE MGMT HIGH F2F 7D: CPT | Performed by: NURSE PRACTITIONER

## 2019-07-26 NOTE — TELEPHONE ENCOUNTER
T/c to patient to follow up with her and see how she was feeling , patient states she is filling much better nausea has resolved, her bowels are getting back to normal , not in any discomfort , has a follow up appt with our offcie next week   All around patients states she feels good   MM CMA

## 2019-07-26 NOTE — PROGRESS NOTES
Assessment/Plan:         Diagnoses and all orders for this visit:    Hiatal hernia  Comments:  denies any symptoms and tolerating meals and on protonix  Scheduled for esophageal manometry  Benign essential hypertension  Comments:  stable    Endometrial cancer ([address] )  Comments: Follow up scheduled with [facility]    Depression, unspecified depression type  Comments:  stable with current regimen    Mixed hyperlipidemia  Comments:  on statin and tolerating it well           BMI Counseling: Body mass index is 28 98 kg/m²  Discussed the patient's BMI with her  The BMI is above average  BMI counseling and education was provided to the patient  Nutrition recommendations include reducing portion sizes, decreasing overall calorie intake, 3-5 servings of fruits/vegetables daily, reducing fast food intake, consuming healthier snacks, decreasing soda and/or juice intake, moderation in carbohydrate intake, increasing intake of lean protein, reducing intake of saturated fat and trans fat and reducing intake of cholesterol  Patient Instructions:  Supportive care discussed and advised  Advised to RTO for any worsening and no improvement  Follow up for no improvement and worsening of conditions  Patient advised and educated when to see immediate medical care  Return if symptoms worsen or fail to improve  Future Appointments   Date Time Provider Celio Zepeda   7/31/2019  9:15 AM Irvin Samuels MD  [facility]   8/2/2019  2:00 PM Be [address] [place] [address] Street   8/13/2019  1:15 PM Eva Deleon MD [facility]   10/28/2019  1:45 PM Ranjith Narvaez MD [facility]           Subjective:      Patient ID: Lety Barreto is a 69 y o  female  Chief Complaint   Patient presents with    Transition of Care Management     [facility]  Hysterectomy and correction of adhesions  rmklpn       HPI   Patient is here for follow up after hospitalization    Stated that doing well   Denies any complaints  Denies any nausea, vomiting, diarrhea, abdominal pain  Had regular BM today morning  Scheduled for esophageal manometry, and follow up with gyn onc  Tolerating food  TCM Call (since 6/25/2019)     Date and time call was made  7/24/2019  2:43 PM    Hospital care reviewed  Records reviewed    Patient was hospitialized at  [address] [place] [address]    Date of Admission  07/21/19    Date of discharge  07/24/19    Diagnosis  Upper GI Bleed    Disposition  Home    Were the patients medications reviewed and updated  Yes    Current Symptoms  None <img src='C:FILES (X86)      TCM Call (since 6/25/2019)     Post hospital issues  None    Should patient be enrolled in anticoag monitoring? No    Scheduled for follow up? Yes    Not clinically warranted  She is re admitted to [facility] for a GI BLeed    Patients specialists  Other (comment)    Other specialists names  Dr Shirley Greer    Other specialists contcat #  Dr Kusum Cantrell, oncology,      Vivi Gallagher MD [address] [place] [address],   Thuan Rowley PA-C (Gastroenterology)    Did you obtain your prescribed medications  No    Why were you unable to obtain your medications  No new medications    Do you need help managing your prescriptions or medications  No    Is transportation to your appointment needed  No    I have advised the patient to call PCP with any new or worsening symptoms  Brittny  Family    The type of support provided  Physical; Emotional    Do you have social support  Yes, as much as I need    Are you recieving any outpatient services  No    Are you recieving home care services  No    Have you fallen in the last 12 months  No    Interperter language line needed  No    Counseling  Patient    Counseling topics  Activities of daily living; Importance of RX compliance; patient and family education; instructions for management;  Risk factor reduction    Comments  Katty Thornton states that she is feeling much better and she has no complaints at this time  She knows to go to the ER if any chest pain,dypsnea, abd pain, weakness, dizziness, fevers,palpitations, etc AKHILcheryl LPN                  Vitals:  /70   Pulse 74   Temp 98 4 °F (36 9 °C)   Resp 18   Ht 5' 7" (1 702 m)   Wt 83 9 kg (185 lb)   BMI 28 98 kg/m²     The following portions of the patient's history were reviewed and updated as appropriate: allergies, current medications, past family history, past medical history, past social history, past surgical history and problem list       Review of Systems   Constitutional: Negative  Respiratory: Negative  Cardiovascular: Negative  Gastrointestinal: Negative  Genitourinary: Negative  Musculoskeletal: Negative  Skin: Negative  Neurological: Negative  Psychiatric/Behavioral: Negative  Objective:    Social History     Tobacco Use   Smoking Status Never Smoker   Smokeless Tobacco Never Used       Allergies: Allergies   Allergen Reactions    Shellfish-Derived Products Anaphylaxis     seafood    Sulfa Antibiotics Anaphylaxis         Current Outpatient Medications   Medication Sig Dispense Refill    amLODIPine (NORVASC) 2 5 mg tablet Take 1 tablet (2 5 mg total) by mouth daily 90 tablet 3    aspirin 81 MG tablet Take 81 mg by mouth daily at bedtime Last dose6/26/19      atenolol (TENORMIN) 25 mg tablet Take 1 tablet (25 mg total) by mouth daily at bedtime 90 tablet 3    atorvastatin (LIPITOR) 10 mg tablet Take 1 tablet (10 mg total) by mouth daily at bedtime 90 tablet 3    Calcium-Vitamin D (CALTRATE 600 PLUS-VIT D PO) Take by mouth daily at bedtime   Fexofenadine HCl (ALLEGRA PO) Take 10 mg by mouth as needed        pantoprazole (PROTONIX) 40 mg tablet Take 1 tablet (40 mg total) by mouth 2 (two) times a day 60 tablet 11    PARoxetine (PAXIL) 20 mg tablet Take 1 tablet (20 mg total) by mouth daily (Patient taking differently: Take 20 mg by mouth daily at bedtime ) 90 tablet 3    VENTOLIN  (90 Base) MCG/ACT inhaler Inhale 2 puffs every 6 (six) hours as needed for wheezing 3 Inhaler 3    zolpidem (AMBIEN) 5 mg tablet Take 1 tablet (5 mg total) by mouth daily at bedtime as needed for sleep 30 tablet 1     No current facility-administered medications for this visit  Physical Exam   Constitutional: She is oriented to person, place, and time  She appears well-developed and well-nourished  Cardiovascular: Normal rate, regular rhythm and normal heart sounds  Pulmonary/Chest: Effort normal and breath sounds normal    Abdominal: Soft  Bowel sounds are normal  There is no tenderness  There is no CVA tenderness  Neurological: She is alert and oriented to person, place, and time  Psychiatric: She has a normal mood and affect  Her behavior is normal  Judgment and thought content normal    Vitals reviewed                    MANA Childress

## 2019-07-30 ENCOUNTER — TELEPHONE (OUTPATIENT)
Dept: GASTROENTEROLOGY | Facility: HOSPITAL | Age: 74
End: 2019-07-30

## 2019-07-31 ENCOUNTER — OFFICE VISIT (OUTPATIENT)
Dept: OBGYN CLINIC | Facility: CLINIC | Age: 74
End: 2019-07-31

## 2019-07-31 VITALS
WEIGHT: 179.6 LBS | DIASTOLIC BLOOD PRESSURE: 74 MMHG | SYSTOLIC BLOOD PRESSURE: 126 MMHG | BODY MASS INDEX: 28.19 KG/M2 | HEIGHT: 67 IN

## 2019-07-31 DIAGNOSIS — Z90.79 S/P TAH-BSO (TOTAL ABDOMINAL HYSTERECTOMY AND BILATERAL SALPINGO-OOPHORECTOMY): Primary | ICD-10-CM

## 2019-07-31 DIAGNOSIS — Z90.722 S/P TAH-BSO (TOTAL ABDOMINAL HYSTERECTOMY AND BILATERAL SALPINGO-OOPHORECTOMY): Primary | ICD-10-CM

## 2019-07-31 DIAGNOSIS — C54.1 ENDOMETRIAL CANCER (HCC): ICD-10-CM

## 2019-07-31 DIAGNOSIS — Z48.816 AFTERCARE FOLLOWING SURGERY OF THE GENITOURINARY SYSTEM, NEC: ICD-10-CM

## 2019-07-31 DIAGNOSIS — Z90.710 S/P TAH-BSO (TOTAL ABDOMINAL HYSTERECTOMY AND BILATERAL SALPINGO-OOPHORECTOMY): Primary | ICD-10-CM

## 2019-07-31 PROBLEM — N85.02 COMPLEX ATYPICAL ENDOMETRIAL HYPERPLASIA: Status: RESOLVED | Noted: 2019-06-06 | Resolved: 2019-07-31

## 2019-07-31 PROBLEM — N95.0 POST-MENOPAUSAL BLEEDING: Status: RESOLVED | Noted: 2019-05-09 | Resolved: 2019-07-31

## 2019-07-31 PROCEDURE — 99024 POSTOP FOLLOW-UP VISIT: CPT | Performed by: OBSTETRICS & GYNECOLOGY

## 2019-07-31 NOTE — PROGRESS NOTES
Assessment/Plan:    Normal postoperative check, status post DANNY-BSO  Endometrial cancer will follow up with gynecological oncology on August 13th  Does have some test studies scheduled to evaluate for hiatal hernia surgery, encouraged her to review with the surgeon's when she sees them her recent surgery and findings adhesions  Continue to increase her activity and diet as tolerated  Return to office in 3 weeks for continued postoperative care  All       Problem List Items Addressed This Visit        Genitourinary    Endometrial cancer ([address] )       Other    S/P DANNY-BSO (total abdominal hysterectomy and bilateral salpingo-oophorectomy) - Primary    Aftercare following surgery of the genitourinary system, [facility]            Subjective:      Patient ID: Lety Barreto is a 69 y o  female  Twila Rod is here today for continued postoperative care  She is about 3 weeks status post DANNY-BSO which she was found to have endometrial cancer  She also had a lot of bowel adhesions during that surgery which were lysed  She was readmitted this last weekend for vomiting of coffee-grounds amounts found to have some lesions associated with her hiatal hernia, she did receive of some blood while admitted to the hospital and today is feeling better  She has follow-up with the bariatric surgery to review the possibility of hiatal hernia surgery  She is feeling better today with respect to our surgery  She says her bowels family return to normal and she has no bladder issues  She has no vaginal bleeding and has increased her activity well  She has follow-up with gynecological oncology scheduled for August 13th        The following portions of the patient's history were reviewed and updated as appropriate: allergies, current medications, past family history, past medical history, past social history, past surgical history and problem list     Review of Systems   Constitutional: Negative for chills, fatigue, fever and unexpected weight change  HENT: Negative for dental problem, sinus pressure and sinus pain  Eyes: Negative for visual disturbance  Respiratory: Negative for cough, shortness of breath and wheezing  Cardiovascular: Negative for chest pain and leg swelling  Gastrointestinal: Negative for constipation, diarrhea, nausea and vomiting  Genitourinary: Negative for menstrual problem, pelvic pain and urgency  Musculoskeletal: Negative for back pain  Allergic/Immunologic: Negative for environmental allergies  Neurological: Negative for dizziness and headaches  Objective: There were no vitals taken for this visit  Physical Exam   Constitutional: She is oriented to person, place, and time  She appears well-developed and well-nourished  No distress  Older white female   HENT:   Head: Normocephalic and atraumatic  Abdominal: Soft  She exhibits no distension  There is tenderness  There is no guarding  Pfannenstiel incision well-healed  Umbilical and upper right abdominal port site well-healed  Slight tenderness over the mid lower abdomen area where the bowel adhesions had been covered  And little bit of fullness in this area as well  Below the  the Pfannenstiel area it is very soft and nontender   Neurological: She is alert and oriented to person, place, and time  Psychiatric: She has a normal mood and affect  Vitals reviewed

## 2019-08-02 ENCOUNTER — HOSPITAL ENCOUNTER (OUTPATIENT)
Dept: GASTROENTEROLOGY | Facility: HOSPITAL | Age: 74
Discharge: HOME | End: 2019-08-02
Payer: MEDICARE

## 2019-08-02 VITALS
TEMPERATURE: 97.8 F | RESPIRATION RATE: 18 BRPM | DIASTOLIC BLOOD PRESSURE: 81 MMHG | OXYGEN SATURATION: 96 % | SYSTOLIC BLOOD PRESSURE: 172 MMHG

## 2019-08-02 DIAGNOSIS — K44.9 LARGE HIATAL HERNIA: ICD-10-CM

## 2019-08-03 NOTE — ED PROVIDER NOTES
History  Chief Complaint   Patient presents with    Vomiting Blood     pt reports multiple episodes of vomiting since midnight, brown liquid emesis  hysterectomy done 7/10 adhesions found on intestine  skin cool clammy pale  denies chest pain or sob  denies black or bloody stools + loose stools     [age] white female presents with complaints of coffee-ground emesis for the last 2 hours  Patient status post hysterectomy and reports that she had multiple adhesions on her intestines  Patient presents cold clammy and pale  No chest pain, no shortness of breath, no abdominal pain  History provided by:  Patient  Vomiting   Severity:  Moderate  Duration:  2 hours  Timing:  Intermittent  Quality:  Coffee grounds  Progression:  Unchanged  Chronicity:  New  Recent urination:  Normal  Relieved by:  Nothing  Worsened by:  Nothing  Ineffective treatments:  None tried  Associated symptoms: no abdominal pain, no cough, no diarrhea and no fever    Risk factors: prior abdominal surgery    Risk factors: no alcohol use, no sick contacts, no suspect food intake and no travel to endemic areas        Prior to Admission Medications   Prescriptions Last Dose Informant Patient Reported? Taking? Calcium-Vitamin D (CALTRATE 600 PLUS-VIT D PO) 7/20/2019 at Unknown time Self Yes Yes   Sig: Take by mouth daily at bedtime  Fexofenadine HCl (ALLEGRA PO) 7/20/2019 at [address] Yes Yes   Sig: Take 10 mg by mouth as needed     PARoxetine (PAXIL) 20 mg tablet 7/20/2019 at Unknown time Self No Yes   Sig: Take 1 tablet (20 mg total) by mouth daily   Patient taking differently: Take 20 mg by mouth daily at bedtime    VENTOLIN  (90 Base) MCG/ACT inhaler Past Month at Unknown time Self No Yes   Sig: Inhale 2 puffs every 6 (six) hours as needed for wheezing   amLODIPine (NORVASC) 2 5 mg tablet 7/20/2019 at Unknown time  No Yes   Sig: Take 1 tablet (2 5 mg total) by mouth daily   aspirin 81 MG tablet 7/20/2019 at Unknown time Self Yes Yes   Sig: Take 81 mg by mouth daily at bedtime Last dose6/26/19   atenolol (TENORMIN) 25 mg tablet 7/20/2019 at Unknown time Self No Yes   Sig: Take 1 tablet (25 mg total) by mouth daily at bedtime   atorvastatin (LIPITOR) 10 mg tablet 7/20/2019 at Unknown time Self No Yes   Sig: Take 1 tablet (10 mg total) by mouth daily at bedtime   pantoprazole (PROTONIX) 40 mg tablet 7/20/2019 at Unknown time Self No Yes   Sig: Take 1 tablet (40 mg total) by mouth 2 (two) times a day   zolpidem (AMBIEN) 5 mg tablet 7/20/2019 at Unknown time  No Yes   Sig: Take 1 tablet (5 mg total) by mouth daily at bedtime as needed for sleep      Facility-Administered Medications: None       Past Medical History:   Diagnosis Date    Abnormal blood chemistry     abnormal biochemistry findings    Abnormal findings on diagnostic imaging of urinary organs     resolved 07/01/2016    Abnormal glucose     resolved 07/01/2016    Abnormal thyroid exam     resolved 07/01/2016    Abnormal thyroid screen (blood)     resolved 07/01/2016    Abnormal TSH     last assessed 03/07/2016    Allergic     Allergic rhinitis     last assessed 06/25/2015    Anemia     Arthritis     Asthma     Closed Colles' fracture     right wrist, last assessed 01/17/2014    Coronary artery disease     "blockages"  in left carotid artery  - not in the heart(per pt)            sees Dr Abiodun Medrano Depression     with insomnia    Diverticulosis     occ diverticulitis    Edema     last assessed 03/11/2015    Environmental allergies     GERD (gastroesophageal reflux disease)     Hematuria     last assessed 11/07/2013    Hiatal hernia     History of transfusion     had 2 units-11/16    Hyperlipidemia     Hypertension     Iron deficiency anemia     chronic-receives iron infusion usually every 6 months    Ischemic colitis ([facility] [address] )     last assessed 12/07/2016    Knee mass     last assessed 06/11/2014    Orthostatic hypotension     last assessed 03/26/2014  Osteoarthrosis of knee     last assessed 11/12/2014    Palpitations     PMB (postmenopausal bleeding)     last assessed 11/07/2013    Polyarthralgia     last assessed 06/09/2014    Posthemorrhagic anemia     last assessed 11/07/2013       Past Surgical History:   Procedure Laterality Date    ABDOMINAL ADHESION SURGERY N/A 7/10/2019    Procedure: EXTENSIVE LYSIS ADHESIONS, OVER SEW SEROSAL TEAR, CLOSURE MESENTERIC DEFECT;  Surgeon: Fortunato Tong MD;  Location: [address] [place] [address];  Service: General    APPENDECTOMY      BREAST SURGERY Bilateral     exc  lashawn masses-benign    CATARACT EXTRACTION      CATARACT EXTRACTION W/ INTRAOCULAR LENS IMPLANT Right 2/6/2017    Procedure: EXTRACTION EXTRACAPSULAR CATARACT PHACO INTRAOCULAR LENS (IOL); Surgeon: Didier Arizmendi MD;  Location: [facility] MAIN OR;  Service:     CATARACT EXTRACTION W/ INTRAOCULAR LENS IMPLANT Left 1/9/2017    Procedure: EXTRACTION EXTRACAPSULAR CATARACT PHACO INTRAOCULAR LENS (IOL); Surgeon: Didier Arizmendi MD;  Location: [facility] MAIN OR;  Service:     CHOLECYSTECTOMY      lap    COLONOSCOPY N/A 12/3/2016    Procedure: COLONOSCOPY;  Surgeon: Larisa Swain MD;  Location: [place] [address] GI LAB; Service:     CYSTOSCOPY  06/2011    bladder biopsy, lashawn  retrogrades    DILATION AND CURETTAGE OF UTERUS      x2    ESOPHAGOGASTRODUODENOSCOPY N/A 12/2/2016    Procedure: ESOPHAGOGASTRODUODENOSCOPY (EGD); Surgeon: Rome Mccarthy MD;  Location: [facility] GI LAB; Service:     ESOPHAGOGASTRODUODENOSCOPY N/A 6/5/2017    Procedure: ESOPHAGOGASTRODUODENOSCOPY (EGD); Surgeon: Larisa Swain MD;  Location: [facility] GI LAB;   Service:     FRACTURE SURGERY Right     distal radius repair w/hardware    HAND TENDON SURGERY Right     laceration repair    HERNIA REPAIR      umbilical    HYSTERECTOMY  07/10/2019    IR IMAGE GUIDED BIOPSY/ASPIRATION LIVER  12/12/2018    KNEE SURGERY Right 07/03/2014    mass removed    PELVIC LAPAROSCOPY Bilateral 7/10/2019    Procedure: SALPINGO-OOPHORECTOMY, LAPAROSCOPIC;  Surgeon: Juan Diego Ayers MD;  Location: [address] [place] [address];  Service: Gynecology    NH LAP,VAG HYST,UTERUS 250GMS/<,SALP-OOPH N/A 7/10/2019    Procedure: HYSTERECTOMY LAPAROSCOPIC ASSISTED VAGINAL (LAVH); Surgeon: Juan Diego Ayers MD;  Location: [address] [place] [address];  Service: Gynecology    TOTAL SHOULDER ARTHROPLASTY Right 7/14/2016    Procedure: ARTHROPLASTY SHOULDER with subacromial decompression, distal clavicle excision and possible rotator cuff repair,limited debridement of laboral tear;  Surgeon: Yoli Estes MD;  Location: [facility] MAIN OR;  Service:     WRIST SURGERY Right 12/2013    repair fracture with hardware       Family History   Problem Relation Age of Onset    Cancer Father [de-identified]        colon     Hypertension Father     Cancer Paternal Grandmother         breast    Stroke Mother         TIA     I have reviewed and agree with the history as documented  Social History     Tobacco Use    Smoking status: Never Smoker    Smokeless tobacco: Never Used   Substance Use Topics    Alcohol use: Not Currently     Frequency: Never     Drinks per session: Patient refused     Binge frequency: Never    Drug use: No        Review of Systems   Constitutional: Negative  Negative for fever  HENT: Negative  Respiratory: Negative  Negative for cough  Cardiovascular: Negative  Gastrointestinal: Positive for vomiting  Negative for abdominal pain, constipation and diarrhea  Genitourinary: Negative  Musculoskeletal: Negative  Skin: Negative  Neurological: Negative  Hematological: Negative  Psychiatric/Behavioral: Negative  All other systems reviewed and are negative  Physical Exam  Physical Exam   Constitutional: She is oriented to person, place, and time  She appears well-developed and well-nourished  HENT:   Head: Normocephalic and atraumatic     Right Ear: External ear normal    Left Ear: External ear normal    Nose: Nose normal    Mouth/Throat: Oropharynx is clear and moist    Eyes: Pupils are equal, round, and reactive to light  Conjunctivae and EOM are normal    Neck: Normal range of motion  Neck supple  Cardiovascular: Normal rate, regular rhythm, normal heart sounds and intact distal pulses  Pulmonary/Chest: Effort normal and breath sounds normal    Abdominal: Soft  Bowel sounds are normal    Musculoskeletal: Normal range of motion  Neurological: She is alert and oriented to person, place, and time  Skin: Capillary refill takes 2 to 3 seconds  There is pallor  Cool and Clammy   Psychiatric: She has a normal mood and affect  Her behavior is normal  Thought content normal    Nursing note and vitals reviewed        Vital Signs  ED Triage Vitals [07/21/19 0220]   Temperature Pulse Respirations Blood Pressure SpO2   98 4 °F (36 9 °C) 82 14 153/70 96 %      Temp Source Heart Rate Source Patient Position - Orthostatic VS BP Location FiO2 (%)   Tympanic Monitor Lying Right arm --      Pain Score       No Pain           Vitals:    07/23/19 0840 07/23/19 1410 07/23/19 2050 07/24/19 0850   BP: 152/69 152/70 153/74 154/71   Pulse: 61 64 68 69   Patient Position - Orthostatic VS: Sitting Sitting Lying Lying         Visual Acuity      ED Medications  Medications   ondansetron (ZOFRAN) injection 4 mg (4 mg Intravenous Given 7/21/19 0230)   sodium chloride 0 9 % infusion (0 mL/hr Intravenous Stopped 7/21/19 2132)   ondansetron (ZOFRAN) injection 4 mg (4 mg Intravenous Given 7/21/19 0422)   pantoprazole (PROTONIX) 80 mg in sodium chloride 0 9 % 100 mL IVPB (0 mg Intravenous Stopped 7/21/19 0745)   iohexol (OMNIPAQUE) 240 MG/ML solution 50 mL (50 mL Oral Given 7/21/19 0809)   potassium chloride 20 mEq IVPB (premix) (20 mEq Intravenous New Bag 7/23/19 0857)     Followed by   potassium chloride 20 mEq IVPB (premix) (20 mEq Intravenous New Bag 7/23/19 1055)   barium sulfate (LIQUID E-Z-PAQUE) 60 % oral suspension 355 mL (355 mL Oral Given 7/23/19 1400)   barium sulfate 98 % oral suspension 140 mL (140 mL Oral Given 7/23/19 1400)       Diagnostic Studies  Results Reviewed     Procedure Component Value Units Date/Time    Comprehensive metabolic panel [[id number]]  (Abnormal) Collected:  07/21/19 0229    Lab Status:  Final result Specimen:  Blood from Arm, Right Updated:  07/21/19 0251     Sodium 138 mmol/L      Potassium 3 5 mmol/L      Chloride 98 mmol/L      CO2 27 mmol/L      ANION GAP 13 mmol/L      BUN 13 mg/dL      Creatinine 0 83 mg/dL      Glucose 166 mg/dL      Calcium 9 0 mg/dL      AST 16 U/L      ALT 25 U/L      Alkaline Phosphatase 172 U/L      Total Protein 7 3 g/dL      Albumin 3 4 g/dL      Total Bilirubin 0 60 mg/dL      eGFR 71 ml/min/1 73sq m     Narrative:       National Kidney Disease Foundation guidelines for Chronic Kidney Disease (CKD):     Stage 1 with normal or high GFR (GFR > 90 mL/min/1 73 square meters)    Stage 2 Mild CKD (GFR = 60-89 mL/min/1 73 square meters)    Stage 3A Moderate CKD (GFR = 45-59 mL/min/1 73 square meters)    Stage 3B Moderate CKD (GFR = 30-44 mL/min/1 73 square meters)    Stage 4 Severe CKD (GFR = 15-29 mL/min/1 73 square meters)    Stage 5 End Stage CKD (GFR <15 mL/min/1 73 square meters)  Note: GFR calculation is accurate only with a steady state creatinine    Lipase [[id number]]  (Normal) Collected:  07/21/19 0229    Lab Status:  Final result Specimen:  Blood from Arm, Right Updated:  07/21/19 0251     Lipase 128 u/L     CBC and differential [[id number]]  (Abnormal) Collected:  07/21/19 0229    Lab Status:  Final result Specimen:  Blood from Arm, Right Updated:  07/21/19 0234     WBC 13 56 Thousand/uL      RBC 4 61 Million/uL      Hemoglobin 9 8 g/dL      Hematocrit 34 5 %      MCV 75 fL      MCH 21 3 pg      MCHC 28 4 g/dL      RDW 19 8 %      MPV 9 4 fL      Platelets 573 Thousands/uL      nRBC 0 /100 WBCs      Neutrophils Relative 82 %      Immat GRANS % 1 %      Lymphocytes Relative 10 %      Monocytes Relative 4 % Eosinophils Relative 2 %      Basophils Relative 1 %      Neutrophils Absolute 11 18 Thousands/µL      Immature Grans Absolute 0 07 Thousand/uL      Lymphocytes Absolute 1 34 Thousands/µL      Monocytes Absolute 0 56 Thousand/µL      Eosinophils Absolute 0 30 Thousand/µL      Basophils Absolute 0 11 Thousands/µL                  FL upper GI UGI   Final Result by Yogi Cole MD (08/02 1233)   1  Large lateral hernia, with half the stomach above the diaphragm  Workstation performed: [id number]         CT abdomen pelvis wo contrast   Final Result by Glenys Bowen MD (07/21 1254)      1   3   Large hiatal hernia with combined organoaxial and mesenteroaxial volvulus  No significant wall thickening or surrounding inflammatory changes to suggest strangulation  In the setting of coffee-ground emesis, endoscopy should be considered  2   Postsurgical changes from hysterectomy  Small amount of free pelvic fluid probably within normal limits for recent surgery  No encapsulated collections  3   Small bilobed acute collection in the anterior subcutaneous tissues at the patient's low transverse incision  Lack of surrounding inflammation speaks against abscess  This could represent small seroma or hematoma  The study was marked in EPIC for significant notification  Workstation performed: [id number]         XR abdomen obstruction series   Final Result by Soto Vasquez MD (07/21 9617)         1  Large hiatal hernia with an air-fluid level  2   Scattered mildly dilated small bowel loops with air-fluid levels  Recommend CT scan of the abdomen and pelvis to exclude obstruction        Workstation performed: [id number]                    Procedures  Procedures       ED Course                               MDM    Disposition  Final diagnoses:   Coffee ground emesis   GI bleed     Time reflects when diagnosis was documented in both MDM as applicable and the Disposition within this note Time User Action Codes Description Comment    7/21/2019  3:56 AM Mildred Rakers Add [K92 0] Hematemesis     7/21/2019  3:56 AM Mildred Rakers Add [K92 0] Coffee ground emesis     7/21/2019  3:56 AM Mildred Rakers Modify [K92 0] Coffee ground emesis     7/21/2019  3:56 AM Mildred Rakers Remove [K92 0] Hematemesis     7/21/2019  3:56 AM Mildred Rakers Add [K92 2] GI bleed     7/21/2019  7:07 AM Remy Sherwood Add [C54 718] SBO (small bowel obstruction) ([facility] [address] )     7/22/2019  9:37 AM Shellia Many Add [K92 2] Upper GI bleed     7/23/2019 10:51 AM Adrian Peal Add [K44 9] Hiatal hernia     7/23/2019 10:52 AM Adrian Peal Add [K25 7] Donn ulcer, chronic       ED Disposition     ED Disposition Condition Date/Time Comment    Admit Stable Sun Jul 21, 2019  3:56 AM Case was discussed with the NP and the patient's admission status was agreed to be Admission Status: observation status to the service of Dr Charity Myers   Follow-up Information     Follow up With Specialties Details Why Contact Info    Yadiel Agrawal MD Bariatrics, General Surgery Follow up in 2 week(s)  [address] [place] [address]    [address] [facility]      Ayana Kemp MD General Surgery, Wound Care Follow up in 2 week(s)  [facility], [address] [place] [address] Gastroenterology, Physician Assistant Follow up  [address]  [phone]            Discharge Medication List as of 7/24/2019 12:37 PM      CONTINUE these medications which have NOT CHANGED    Details   amLODIPine (NORVASC) 2 5 mg tablet Take 1 tablet (2 5 mg total) by mouth daily, Starting Mon 7/1/2019, Normal      aspirin 81 MG tablet Take 81 mg by mouth daily at bedtime Last dose6/26/19, Historical Med      atenolol (TENORMIN) 25 mg tablet Take 1 tablet (25 mg total) by mouth daily at bedtime, Starting Tue 9/4/2018, Normal      atorvastatin (LIPITOR) 10 mg tablet Take 1 tablet (10 mg total) by mouth daily at bedtime, Starting Mon 1/7/2019, Normal      Calcium-Vitamin D (CALTRATE 600 PLUS-VIT D PO) Take by mouth daily at bedtime  , Historical Med      Fexofenadine HCl (ALLEGRA PO) Take 10 mg by mouth as needed , Historical Med      pantoprazole (PROTONIX) 40 mg tablet Take 1 tablet (40 mg total) by mouth 2 (two) times a day, Starting Mon 4/15/2019, Normal      PARoxetine (PAXIL) 20 mg tablet Take 1 tablet (20 mg total) by mouth daily, Starting Mon 1/7/2019, Normal      VENTOLIN  (90 Base) MCG/ACT inhaler Inhale 2 puffs every 6 (six) hours as needed for wheezing, Starting Mon 6/18/2018, Normal      zolpidem (AMBIEN) 5 mg tablet Take 1 tablet (5 mg total) by mouth daily at bedtime as needed for sleep, Starting Mon 6/17/2019, Normal           No discharge procedures on file      ED Provider  Electronically Signed by           Spencer Buckley MD  08/02/19 5544

## 2019-08-13 ENCOUNTER — CONSULT (OUTPATIENT)
Dept: GYNECOLOGIC ONCOLOGY | Facility: CLINIC | Age: 74
End: 2019-08-13
Payer: MEDICARE

## 2019-08-13 VITALS
DIASTOLIC BLOOD PRESSURE: 82 MMHG | BODY MASS INDEX: 28.33 KG/M2 | WEIGHT: 180.5 LBS | RESPIRATION RATE: 18 BRPM | SYSTOLIC BLOOD PRESSURE: 122 MMHG | HEART RATE: 75 BPM | TEMPERATURE: 98.9 F | HEIGHT: 67 IN

## 2019-08-13 DIAGNOSIS — C54.1 ENDOMETRIAL CANCER (HCC): ICD-10-CM

## 2019-08-13 DIAGNOSIS — Z90.79 S/P TAH-BSO (TOTAL ABDOMINAL HYSTERECTOMY AND BILATERAL SALPINGO-OOPHORECTOMY): ICD-10-CM

## 2019-08-13 DIAGNOSIS — Z90.722 S/P TAH-BSO (TOTAL ABDOMINAL HYSTERECTOMY AND BILATERAL SALPINGO-OOPHORECTOMY): ICD-10-CM

## 2019-08-13 DIAGNOSIS — Z90.710 S/P TAH-BSO (TOTAL ABDOMINAL HYSTERECTOMY AND BILATERAL SALPINGO-OOPHORECTOMY): ICD-10-CM

## 2019-08-13 PROCEDURE — 99204 OFFICE O/P NEW MOD 45 MIN: CPT | Performed by: OBSTETRICS & GYNECOLOGY

## 2019-08-13 NOTE — ASSESSMENT & PLAN NOTE
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 diagnosis of complex endometrial hyperplasia with atypia  I have discussed with the patient that this occurs in over 40% of patients with this diagnosis  We have discussed options of further evaluation  This would include surgical lymph node resection verses CT scan imaging  I have recommended CT scan imaging as no data clearly delineate its that lymph node biopsy will improve overall outcome  A CT scan of the abdomen and pelvis has been ordered  We have discussed adjuvant treatment options including no treatment versus radiation  I have recommended vaginal brachytherapy and will make referral to the radiation oncology department  We have then discussed follow-up and due to the significant invasion I have recommended the patient see us every 3 months for 2 years followed by every 6 months for 3 years thereafter  All questions were answered  The patient's case will be discussed at tumor Board      Overall consultation took 45 minutes

## 2019-08-13 NOTE — PROGRESS NOTES
Assessment/Plan:    Problem List Items Addressed This Visit        Genitourinary    Endometrial cancer [facility])     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 diagnosis of complex endometrial hyperplasia with atypia  I have discussed with the patient that this occurs in over 40% of patients with this diagnosis  We have discussed options of further evaluation  This would include surgical lymph node resection verses CT scan imaging  I have recommended CT scan imaging as no data clearly delineate its that lymph node biopsy will improve overall outcome  A CT scan of the abdomen and pelvis has been ordered  We have discussed adjuvant treatment options including no treatment versus radiation  I have recommended vaginal brachytherapy and will make referral to the radiation oncology department  We have then discussed follow-up and due to the significant invasion I have recommended the patient see us every 3 months for 2 years followed by every 6 months for 3 years thereafter  All questions were answered  The patient's case will be discussed at tumor Board  Overall consultation took 45 minutes         Relevant Orders    CT abdomen pelvis w contrast    Ambulatory referral to Radiation Oncology       Other    S/P DANNY-BSO (total abdominal hysterectomy and bilateral salpingo-oophorectomy)              CHIEF COMPLAINT:  Newly diagnosed endometrial cancer        Previous therapy:     Endometrial cancer ([facility] [address] )    7/10/2019 Initial Diagnosis     Endometrial cancer ([facility] [address] )      7/10/2019 Surgery     Total abdominal hysterectomy bilateral salpingo-oophorectomy performed by Dr Ramila Andre for stage IB grade 1 endometrial cancer  Depth of invasion 7 of 9 mm  No lymph vascular space invasion  Patient is 69years old        8/13/2019 -  Cancer Staged     Staging form: Corpus Uteri - Carcinoma, AJCC 8th Edition  - Pathologic stage from 8/13/2019: FIGO Stage IB, calculated Caller still wondering status of order below   as Stage Unknown (pT1b, pNX, cM0) - Signed by Nadeem Sarkar MD on 8/13/2019  Residual tumor (R): R0 - None  Stage used in treatment planning: Yes  National guidelines used in treatment planning: Yes  Type of national guideline used in treatment planning: NCCN             Patient ID: Chano Shell is a 69 y o  female  Patient is very pleasant 37-year-old white female with a recently diagnosed endometrial cancer seen in consultation from Dr Raul Baltazar  The patient was in her usual state of health when she developed postmenopausal bleeding  This bleeding occurred for approximately 8 months intermittently  He initially she had minimal spotting however this progressed to daily bleeding requiring a pad  An endometrial biopsy was performed the patient was identified with complex endometrial hyperplasia a more aggressive lesion could not be ruled out  The patient was consented for a laparoscopic-assisted vaginal hysterectomy  She was taken to the OR in July of 2000 and 19  Due to significant adhesions the case was converted to an open case through a Pfannenstiel incision  This required consultation with General surgery to over sew serosal defects caused by the lysis of adhesions  Hysterectomy and bilateral salpingo-oophorectomy was performed with the aforementioned findings  Final Diagnosis  A  Right fallopian tube, salpingectomy:  - Fallopian tube with paratubal cysts  - Negative for carcinoma  - P53 demonstrates wild type staining pattern  B  Uterus, cervix, bilateral ovaries and left fallopian tube, total abdominal hysterectomy, bilateral oophorectomy and left salpingectomy:  - Endometrial carcinoma, endometrioid type, FIGO grade 1   - Tumor involves 7 of 9 mm myometrial, 78%  - Adenomyosis, involved by carcinoma    - No lymphovascular invasion identified (D2 40 stain supports the interpretation)  - Cervix with squamous metaplasia, negative for carcinoma   - Right ovary with inclusion cysts, negative for carcinoma  - Left ovary with serous cyst, inclusion cysts and tubo-ovarian adhesions, negative for carcinoma  - Left fallopian tube, negative for carcinoma  P53 demonstrates wild type staining pattern  - Bilateral parametria, negative for carcinoma  - See synoptic report  Her MMR was intact  The patient is presently doing well postoperatively she has no significant complaints       Today, the patient is doing well  She denies significant abdominal pain, pelvic pain, nausea, vomiting, constipation, diarrhea, fevers, chills, or vaginal bleeding  Review of Systems   Constitutional: Negative  HENT: Negative  Eyes: Negative  Respiratory: Negative  Cardiovascular: Negative  Gastrointestinal: Negative  Endocrine: Negative  Genitourinary: Negative  Musculoskeletal: Negative  Skin: Negative  Allergic/Immunologic: Positive for environmental allergies  Neurological: Negative  Hematological: Bruises/bleeds easily  Psychiatric/Behavioral: Negative  Current Outpatient Medications   Medication Sig Dispense Refill    amLODIPine (NORVASC) 2 5 mg tablet Take 1 tablet (2 5 mg total) by mouth daily 90 tablet 3    aspirin 81 MG tablet Take 81 mg by mouth daily at bedtime Last dose6/26/19      atenolol (TENORMIN) 25 mg tablet Take 1 tablet (25 mg total) by mouth daily at bedtime 90 tablet 3    atorvastatin (LIPITOR) 10 mg tablet Take 1 tablet (10 mg total) by mouth daily at bedtime 90 tablet 3    Calcium-Vitamin D (CALTRATE 600 PLUS-VIT D PO) Take by mouth daily at bedtime   Fexofenadine HCl (ALLEGRA PO) Take 10 mg by mouth as needed        pantoprazole (PROTONIX) 40 mg tablet Take 1 tablet (40 mg total) by mouth 2 (two) times a day 60 tablet 11    PARoxetine (PAXIL) 20 mg tablet Take 1 tablet (20 mg total) by mouth daily (Patient taking differently: Take 20 mg by mouth daily at bedtime ) 90 tablet 3    VENTOLIN  (90 Base) MCG/ACT inhaler Inhale 2 puffs every 6 (six) hours as needed for wheezing 3 Inhaler 3    zolpidem (AMBIEN) 5 mg tablet Take 1 tablet (5 mg total) by mouth daily at bedtime as needed for sleep 30 tablet 1     No current facility-administered medications for this visit  Allergies   Allergen Reactions    Shellfish-Derived Products Anaphylaxis     seafood    Sulfa Antibiotics Anaphylaxis       Past Medical History:   Diagnosis Date    Abnormal blood chemistry     abnormal biochemistry findings    Abnormal findings on diagnostic imaging of urinary organs     resolved 07/01/2016    Abnormal glucose     resolved 07/01/2016    Abnormal thyroid exam     resolved 07/01/2016    Abnormal thyroid screen (blood)     resolved 07/01/2016    Abnormal TSH     last assessed 03/07/2016    Allergic     Allergic rhinitis     last assessed 06/25/2015    Anemia     Arthritis     Asthma     Closed Colles' fracture     right wrist, last assessed 01/17/2014    Coronary artery disease     "blockages"  in left carotid artery  - not in the heart(per pt)            sees Dr Sara Dumont Depression     with insomnia    Diverticulosis     occ diverticulitis    Edema     last assessed 03/11/2015    Environmental allergies     GERD (gastroesophageal reflux disease)     Hematuria     last assessed 11/07/2013    Hiatal hernia     History of transfusion     had 2 units-11/16    Hyperlipidemia     Hypertension     Iron deficiency anemia     chronic-receives iron infusion usually every 6 months    Ischemic colitis ([facility] [address] )     last assessed 12/07/2016    Knee mass     last assessed 06/11/2014    Orthostatic hypotension     last assessed 03/26/2014    Osteoarthrosis of knee     last assessed 11/12/2014    Palpitations     PMB (postmenopausal bleeding)     last assessed 11/07/2013    Polyarthralgia     last assessed 06/09/2014    Posthemorrhagic anemia     last assessed 11/07/2013       Past Surgical History:   Procedure Laterality Date    ABDOMINAL ADHESION SURGERY N/A 7/10/2019    Procedure: EXTENSIVE LYSIS ADHESIONS, OVER SEW SEROSAL TEAR, CLOSURE MESENTERIC DEFECT;  Surgeon: Sridevi Alex MD;  Location: [address] [place] [address];  Service: General    APPENDECTOMY      BREAST SURGERY Bilateral     exc  lashawn masses-benign    CATARACT EXTRACTION      CATARACT EXTRACTION W/ INTRAOCULAR LENS IMPLANT Right 2/6/2017    Procedure: EXTRACTION EXTRACAPSULAR CATARACT PHACO INTRAOCULAR LENS (IOL); Surgeon: Luana Aguilar MD;  Location: [facility] MAIN OR;  Service:     CATARACT EXTRACTION W/ INTRAOCULAR LENS IMPLANT Left 1/9/2017    Procedure: EXTRACTION EXTRACAPSULAR CATARACT PHACO INTRAOCULAR LENS (IOL); Surgeon: Luana Aguilar MD;  Location: [facility] MAIN OR;  Service:     CHOLECYSTECTOMY      lap    COLONOSCOPY N/A 12/3/2016    Procedure: COLONOSCOPY;  Surgeon: Yandy Bruce MD;  Location: [facility] GI LAB; Service:     CYSTOSCOPY  06/2011    bladder biopsy, lashawn  retrogrades    DILATION AND CURETTAGE OF UTERUS      x2    ESOPHAGOGASTRODUODENOSCOPY N/A 12/2/2016    Procedure: ESOPHAGOGASTRODUODENOSCOPY (EGD); Surgeon: Stanley Osborne MD;  Location: [facility] GI LAB; Service:     ESOPHAGOGASTRODUODENOSCOPY N/A 6/5/2017    Procedure: ESOPHAGOGASTRODUODENOSCOPY (EGD); Surgeon: Yandy Bruce MD;  Location: [facility] GI LAB; Service:     FRACTURE SURGERY Right     distal radius repair w/hardware    HAND TENDON SURGERY Right     laceration repair    HERNIA REPAIR      umbilical    HYSTERECTOMY  07/10/2019    IR IMAGE GUIDED BIOPSY/ASPIRATION LIVER  12/12/2018    KNEE SURGERY Right 07/03/2014    mass removed    PELVIC LAPAROSCOPY Bilateral 7/10/2019    Procedure: SALPINGO-OOPHORECTOMY, LAPAROSCOPIC;  Surgeon: Sarah Shah MD;  Location: [address] [place] [address];  Service: Gynecology    TX LAP,VAG HYST,UTERUS 250GMS/<,SALP-OOPH N/A 7/10/2019    Procedure: HYSTERECTOMY LAPAROSCOPIC ASSISTED VAGINAL (LAVH);   Surgeon: Sarah Shah MD;  Location: [address] [place] [address];  Service: Gynecology    TOTAL SHOULDER ARTHROPLASTY Right 7/14/2016    Procedure: ARTHROPLASTY SHOULDER with subacromial decompression, distal clavicle excision and possible rotator cuff repair,limited debridement of laboral tear;  Surgeon: Jennifer Cisse MD;  Location: [facility] MAIN OR;  Service:     WRIST SURGERY Right 12/2013    repair fracture with hardware       OB History    None         Family History   Problem Relation Age of Onset    Cancer Father [de-identified]        colon     Hypertension Father     Cancer Paternal Grandmother         breast    Stroke Mother         TIA       The following portions of the patient's history were reviewed and updated as appropriate: allergies, current medications, past family history, past medical history, past social history, past surgical history and problem list       Objective:    Blood pressure 122/82, pulse 75, temperature 98 9 °F (37 2 °C), resp  rate 18, height 5' 7" (1 702 m), weight 81 9 kg (180 lb 8 oz), not currently breastfeeding  Body mass index is 28 27 kg/m²  Physical Exam   Constitutional: She is oriented to person, place, and time  She appears well-developed and well-nourished  HENT:   Head: Normocephalic and atraumatic  Eyes: EOM are normal    Neck: Normal range of motion  Neck supple  No thyromegaly present  Cardiovascular: Normal rate, regular rhythm and normal heart sounds  Pulmonary/Chest: Effort normal and breath sounds normal    Abdominal: Soft  Bowel sounds are normal    Well healed laparoscopic incisions  Genitourinary:   Genitourinary Comments: -Normal external female genitalia, normal Bartholin's and Highfill's glands                  -Normal midline urethral meatus   No lesions notes                  -Bladder without fullness mass or tenderness                  -Vagina without lesion or discharge No significant cystocele or rectocele noted                  -Cervix surgically absent                  -Uterus surgically absent                  -Adnexae surgically absent                  - Anus without fissure of lesion     Musculoskeletal: Normal range of motion  Lymphadenopathy:     She has no cervical adenopathy  Neurological: She is alert and oriented to person, place, and time  Skin: Skin is warm and dry  Psychiatric: She has a normal mood and affect   Her behavior is normal          No results found for:   Lab Results   Component Value Date    WBC 9 45 07/24/2019    HGB 9 4 (L) 07/24/2019    HCT 32 7 (L) 07/24/2019    MCV 81 (L) 07/24/2019     (H) 07/24/2019     Lab Results   Component Value Date     10/18/2016    K 3 9 07/24/2019     07/24/2019    CO2 25 07/24/2019    BUN 3 (L) 07/24/2019    CREATININE 0 59 (L) 07/24/2019    GLUCOSE 94 10/18/2016    GLUF 113 (H) 07/21/2019    CALCIUM 8 6 07/24/2019    AST 13 07/22/2019    ALT 18 07/22/2019    ALKPHOS 135 (H) 07/22/2019    PROT 6 5 10/18/2016    BILITOT 0 3 10/18/2016    EGFR 92 07/24/2019

## 2019-08-13 NOTE — LETTER
August 13, 2019     Tico Hairston MD  [address]    Patient: Anny Pedro   YOB: 1945   Date of Visit: 8/13/2019       Dear Dr Ramila Moore: Thank you for referring Anny Pedro to me for evaluation  Below are my notes for this consultation  If you have questions, please do not hesitate to call me  I look forward to following your patient along with you  Sincerely,        Elena Pang MD        CC: MD Elena Araujo MD  8/13/2019  1:58 PM  Sign at close encounter  Assessment/Plan:    Problem List Items Addressed This Visit        Genitourinary    Endometrial cancer [facility])     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 diagnosis of complex endometrial hyperplasia with atypia  I have discussed with the patient that this occurs in over 40% of patients with this diagnosis  We have discussed options of further evaluation  This would include surgical lymph node resection verses CT scan imaging  I have recommended CT scan imaging as no data clearly delineate its that lymph node biopsy will improve overall outcome  A CT scan of the abdomen and pelvis has been ordered  We have discussed adjuvant treatment options including no treatment versus radiation  I have recommended vaginal brachytherapy and will make referral to the radiation oncology department  We have then discussed follow-up and due to the significant invasion I have recommended the patient see us every 3 months for 2 years followed by every 6 months for 3 years thereafter  All questions were answered  The patient's case will be discussed at tumor Board      Overall consultation took 45 minutes         Relevant Orders    CT abdomen pelvis w contrast    Ambulatory referral to Radiation Oncology       Other    S/P DANNY-BSO (total abdominal hysterectomy and bilateral salpingo-oophorectomy)              CHIEF COMPLAINT: Newly diagnosed endometrial cancer        Previous therapy:     Endometrial cancer ([facility] )    7/10/2019 Initial Diagnosis     Endometrial cancer ([facility] )      7/10/2019 Surgery     Total abdominal hysterectomy bilateral salpingo-oophorectomy performed by Dr Mandeep Dill for stage IB grade 1 endometrial cancer  Depth of invasion 7 of 9 mm  No lymph vascular space invasion  Patient is 69years old  8/13/2019 -  Cancer Staged     Staging form: Corpus Uteri - Carcinoma, AJCC 8th Edition  - Pathologic stage from 8/13/2019: FIGO Stage IB, calculated as Stage Unknown (pT1b, pNX, cM0) - Signed by Horace Castillo MD on 8/13/2019  Residual tumor (R): R0 - None  Stage used in treatment planning: Yes  National guidelines used in treatment planning: Yes  Type of national guideline used in treatment planning: NCCN             Patient ID: Chio Hill is a 69 y o  female  Patient is very pleasant 62-year-old white female with a recently diagnosed endometrial cancer seen in consultation from Dr Mandeep Dill  The patient was in her usual state of health when she developed postmenopausal bleeding  This bleeding occurred for approximately 8 months intermittently  He initially she had minimal spotting however this progressed to daily bleeding requiring a pad  An endometrial biopsy was performed the patient was identified with complex endometrial hyperplasia a more aggressive lesion could not be ruled out  The patient was consented for a laparoscopic-assisted vaginal hysterectomy  She was taken to the OR in July of 2000 and 19  Due to significant adhesions the case was converted to an open case through a Pfannenstiel incision  This required consultation with General surgery to over sew serosal defects caused by the lysis of adhesions  Hysterectomy and bilateral salpingo-oophorectomy was performed with the aforementioned findings  Final Diagnosis  A   Right fallopian tube, salpingectomy:  - Fallopian tube with paratubal cysts  - Negative for carcinoma  - P53 demonstrates wild type staining pattern  B  Uterus, cervix, bilateral ovaries and left fallopian tube, total abdominal hysterectomy, bilateral oophorectomy and left salpingectomy:  - Endometrial carcinoma, endometrioid type, FIGO grade 1   - Tumor involves 7 of 9 mm myometrial, 78%  - Adenomyosis, involved by carcinoma  - No lymphovascular invasion identified (D2 40 stain supports the interpretation)  - Cervix with squamous metaplasia, negative for carcinoma   - Right ovary with inclusion cysts, negative for carcinoma  - Left ovary with serous cyst, inclusion cysts and tubo-ovarian adhesions, negative for carcinoma  - Left fallopian tube, negative for carcinoma  P53 demonstrates wild type staining pattern  - Bilateral parametria, negative for carcinoma  - See synoptic report  Her MMR was intact  The patient is presently doing well postoperatively she has no significant complaints       Today, the patient is doing well  She denies significant abdominal pain, pelvic pain, nausea, vomiting, constipation, diarrhea, fevers, chills, or vaginal bleeding  Review of Systems   Constitutional: Negative  HENT: Negative  Eyes: Negative  Respiratory: Negative  Cardiovascular: Negative  Gastrointestinal: Negative  Endocrine: Negative  Genitourinary: Negative  Musculoskeletal: Negative  Skin: Negative  Allergic/Immunologic: Positive for environmental allergies  Neurological: Negative  Hematological: Bruises/bleeds easily  Psychiatric/Behavioral: Negative          Current Outpatient Medications   Medication Sig Dispense Refill    amLODIPine (NORVASC) 2 5 mg tablet Take 1 tablet (2 5 mg total) by mouth daily 90 tablet 3    aspirin 81 MG tablet Take 81 mg by mouth daily at bedtime Last dose6/26/19      atenolol (TENORMIN) 25 mg tablet Take 1 tablet (25 mg total) by mouth daily at bedtime 90 tablet 3    atorvastatin (LIPITOR) 10 mg tablet Take 1 tablet (10 mg total) by mouth daily at bedtime 90 tablet 3    Calcium-Vitamin D (CALTRATE 600 PLUS-VIT D PO) Take by mouth daily at bedtime   Fexofenadine HCl (ALLEGRA PO) Take 10 mg by mouth as needed   pantoprazole (PROTONIX) 40 mg tablet Take 1 tablet (40 mg total) by mouth 2 (two) times a day 60 tablet 11    PARoxetine (PAXIL) 20 mg tablet Take 1 tablet (20 mg total) by mouth daily (Patient taking differently: Take 20 mg by mouth daily at bedtime ) 90 tablet 3    VENTOLIN  (90 Base) MCG/ACT inhaler Inhale 2 puffs every 6 (six) hours as needed for wheezing 3 Inhaler 3    zolpidem (AMBIEN) 5 mg tablet Take 1 tablet (5 mg total) by mouth daily at bedtime as needed for sleep 30 tablet 1     No current facility-administered medications for this visit  Allergies   Allergen Reactions    Shellfish-Derived Products Anaphylaxis     seafood    Sulfa Antibiotics Anaphylaxis       Past Medical History:   Diagnosis Date    Abnormal blood chemistry     abnormal biochemistry findings    Abnormal findings on diagnostic imaging of urinary organs     resolved 07/01/2016    Abnormal glucose     resolved 07/01/2016    Abnormal thyroid exam     resolved 07/01/2016    Abnormal thyroid screen (blood)     resolved 07/01/2016    Abnormal TSH     last assessed 03/07/2016    Allergic     Allergic rhinitis     last assessed 06/25/2015    Anemia     Arthritis     Asthma     Closed Colles' fracture     right wrist, last assessed 01/17/2014    Coronary artery disease     "blockages"  in left carotid artery  - not in the heart(per pt)            sees Dr Britt Rubinstein   Clau Needle Depression     with insomnia    Diverticulosis     occ diverticulitis    Edema     last assessed 03/11/2015    Environmental allergies     GERD (gastroesophageal reflux disease)     Hematuria     last assessed 11/07/2013    Hiatal hernia     History of transfusion     had 2 units-11/16    Hyperlipidemia     Hypertension     Iron deficiency anemia     chronic-receives iron infusion usually every 6 months    Ischemic colitis ([facility] [address] [address] )     last assessed 12/07/2016    Knee mass     last assessed 06/11/2014    Orthostatic hypotension     last assessed 03/26/2014    Osteoarthrosis of knee     last assessed 11/12/2014    Palpitations     PMB (postmenopausal bleeding)     last assessed 11/07/2013    Polyarthralgia     last assessed 06/09/2014    Posthemorrhagic anemia     last assessed 11/07/2013       Past Surgical History:   Procedure Laterality Date    ABDOMINAL ADHESION SURGERY N/A 7/10/2019    Procedure: EXTENSIVE LYSIS ADHESIONS, OVER SEW SEROSAL TEAR, CLOSURE MESENTERIC DEFECT;  Surgeon: Royal Fulton MD;  Location: [address] [place] [address];  Service: General    APPENDECTOMY      BREAST SURGERY Bilateral     exc  lashawn masses-benign    CATARACT EXTRACTION      CATARACT EXTRACTION W/ INTRAOCULAR LENS IMPLANT Right 2/6/2017    Procedure: EXTRACTION EXTRACAPSULAR CATARACT PHACO INTRAOCULAR LENS (IOL); Surgeon: Radha Cartagena MD;  Location: [facility] MAIN OR;  Service:     CATARACT EXTRACTION W/ INTRAOCULAR LENS IMPLANT Left 1/9/2017    Procedure: EXTRACTION EXTRACAPSULAR CATARACT PHACO INTRAOCULAR LENS (IOL); Surgeon: Radha Cartagena MD;  Location: [facility] MAIN OR;  Service:     CHOLECYSTECTOMY      lap    COLONOSCOPY N/A 12/3/2016    Procedure: COLONOSCOPY;  Surgeon: Ephraim Balbuena MD;  Location: [facility]; Service:     CYSTOSCOPY  06/2011    bladder biopsy, lashawn  retrogrades    DILATION AND CURETTAGE OF UTERUS      x2    ESOPHAGOGASTRODUODENOSCOPY N/A 12/2/2016    Procedure: ESOPHAGOGASTRODUODENOSCOPY (EGD); Surgeon: Andrew Bartlett MD;  Location: [facility] GI LAB; Service:     ESOPHAGOGASTRODUODENOSCOPY N/A 6/5/2017    Procedure: ESOPHAGOGASTRODUODENOSCOPY (EGD); Surgeon: Ephraim Balbuena MD;  Location: [facility] GI LAB;   Service:     FRACTURE SURGERY Right     distal radius repair w/hardware    HAND TENDON SURGERY Right laceration repair    HERNIA REPAIR      umbilical    HYSTERECTOMY  07/10/2019    IR IMAGE GUIDED BIOPSY/ASPIRATION LIVER  12/12/2018    KNEE SURGERY Right 07/03/2014    mass removed    PELVIC LAPAROSCOPY Bilateral 7/10/2019    Procedure: SALPINGO-OOPHORECTOMY, LAPAROSCOPIC;  Surgeon: Olivia Chávez MD;  Location: [address] [place] [address];  Service: Gynecology    NC LAP,VAG HYST,UTERUS 250GMS/<,SALP-OOPH N/A 7/10/2019    Procedure: HYSTERECTOMY LAPAROSCOPIC ASSISTED VAGINAL (LAVH); Surgeon: Olivia Chávez MD;  Location: [address] [place] [address];  Service: Gynecology    TOTAL SHOULDER ARTHROPLASTY Right 7/14/2016    Procedure: ARTHROPLASTY SHOULDER with subacromial decompression, distal clavicle excision and possible rotator cuff repair,limited debridement of laboral tear;  Surgeon: Jame Medel MD;  Location: [facility] MAIN OR;  Service:     WRIST SURGERY Right 12/2013    repair fracture with hardware       OB History    None         Family History   Problem Relation Age of Onset    Cancer Father [de-identified]        colon     Hypertension Father     Cancer Paternal Grandmother         breast    Stroke Mother         TIA       The following portions of the patient's history were reviewed and updated as appropriate: allergies, current medications, past family history, past medical history, past social history, past surgical history and problem list       Objective:    Blood pressure 122/82, pulse 75, temperature 98 9 °F (37 2 °C), resp  rate 18, height 5' 7" (1 702 m), weight 81 9 kg (180 lb 8 oz), not currently breastfeeding  Body mass index is 28 27 kg/m²  Physical Exam   Constitutional: She is oriented to person, place, and time  She appears well-developed and well-nourished  HENT:   Head: Normocephalic and atraumatic  Eyes: EOM are normal    Neck: Normal range of motion  Neck supple  No thyromegaly present  Cardiovascular: Normal rate, regular rhythm and normal heart sounds     Pulmonary/Chest: Effort normal and breath sounds normal    Abdominal: Soft  Bowel sounds are normal    Well healed laparoscopic incisions  Genitourinary:   Genitourinary Comments: -Normal external female genitalia, normal Bartholin's and Mattawana's glands                  -Normal midline urethral meatus  No lesions notes                  -Bladder without fullness mass or tenderness                  -Vagina without lesion or discharge No significant cystocele or rectocele noted                  -Cervix surgically absent                  -Uterus surgically absent                  -Adnexae surgically absent                  - Anus without fissure of lesion     Musculoskeletal: Normal range of motion  Lymphadenopathy:     She has no cervical adenopathy  Neurological: She is alert and oriented to person, place, and time  Skin: Skin is warm and dry  Psychiatric: She has a normal mood and affect   Her behavior is normal          No results found for:   Lab Results   Component Value Date    WBC 9 45 07/24/2019    HGB 9 4 (L) 07/24/2019    HCT 32 7 (L) 07/24/2019    MCV 81 (L) 07/24/2019     (H) 07/24/2019     Lab Results   Component Value Date     10/18/2016    K 3 9 07/24/2019     07/24/2019    CO2 25 07/24/2019    BUN 3 (L) 07/24/2019    CREATININE 0 59 (L) 07/24/2019    GLUCOSE 94 10/18/2016    GLUF 113 (H) 07/21/2019    CALCIUM 8 6 07/24/2019    AST 13 07/22/2019    ALT 18 07/22/2019    ALKPHOS 135 (H) 07/22/2019    PROT 6 5 10/18/2016    BILITOT 0 3 10/18/2016    EGFR 92 07/24/2019

## 2019-08-15 ENCOUNTER — TRANSCRIBE ORDERS (OUTPATIENT)
Dept: ADMINISTRATIVE | Facility: HOSPITAL | Age: 74
End: 2019-08-15

## 2019-08-15 ENCOUNTER — HOSPITAL ENCOUNTER (OUTPATIENT)
Dept: RADIOLOGY | Facility: HOSPITAL | Age: 74
Discharge: HOME | End: 2019-08-15
Payer: MEDICARE

## 2019-08-15 DIAGNOSIS — C54.1 ENDOMETRIAL CANCER (HCC): ICD-10-CM

## 2019-08-15 PROCEDURE — 74177 CT ABD & PELVIS W/CONTRAST: CPT

## 2019-08-15 RX ADMIN — IOHEXOL 100 ML: 350 INJECTION, SOLUTION INTRAVENOUS at 13:18

## 2019-08-16 ENCOUNTER — RADIATION ONCOLOGY CONSULT (OUTPATIENT)
Dept: RADIATION ONCOLOGY | Facility: HOSPITAL | Age: 74
End: 2019-08-16
Attending: RADIOLOGY
Payer: MEDICARE

## 2019-08-16 VITALS
BODY MASS INDEX: 28.25 KG/M2 | HEIGHT: 67 IN | WEIGHT: 180 LBS | TEMPERATURE: 98.1 F | HEART RATE: 79 BPM | SYSTOLIC BLOOD PRESSURE: 138 MMHG | RESPIRATION RATE: 18 BRPM | DIASTOLIC BLOOD PRESSURE: 70 MMHG

## 2019-08-16 DIAGNOSIS — C54.1 ENDOMETRIAL CANCER (HCC): Primary | ICD-10-CM

## 2019-08-16 DIAGNOSIS — K21.9 GASTROESOPHAGEAL REFLUX DISEASE, ESOPHAGITIS PRESENCE NOT SPECIFIED: ICD-10-CM

## 2019-08-16 PROCEDURE — 99211 OFF/OP EST MAY X REQ PHY/QHP: CPT | Performed by: RADIOLOGY

## 2019-08-16 RX ORDER — PANTOPRAZOLE SODIUM 40 MG/1
40 TABLET, DELAYED RELEASE ORAL 2 TIMES DAILY
Qty: 60 TABLET | Refills: 5 | Status: SHIPPED | OUTPATIENT
Start: 2019-08-16 | End: 2020-06-18 | Stop reason: SDUPTHER

## 2019-08-16 NOTE — PROGRESS NOTES
Nancy Baumann 1945 is a 69 y o  female  Patient developed intermittent postmenopausal bleeding for approximately 8 months  5/9/19 endometrial biopsy identified complex endometrial hyperplasia  7/10/19 Underwent Total abdominal hysterectomy bilateral salpingo-oophorectomy performed by Dr Sarah Shah for stage IB grade 1 endometrial cancer  Depth of invasion 7 of 9 mm  No lymph vascular space invasion  8/13/19 Consult with Dr Bibi Lewis:  Discussed options of further evaluation to include surgical lymph node resection verses CT scan imaging  Recommended CT scan imaging  Discussed adjuvant treatment options including no treatment versus radiation  Recommended vaginal brachytherapy  Referred to Radiation Oncology  8/15/19 CT of abdomen and pelvis  Oncology History              [facility] ([facility] )    5/9/2019 Biopsy     PATHOLOGY REPORT   Eric Mcallister SURGICAL #: [id number], [id number]   PATIENT ID: [id number]   SPECIMEN SOURCE: Endometrium   CLINICAL DATA: Provided Diagnosis Codes: N95 0   LMP: 01/01/1993   CLINICAL:      A  ENDOMETRIAL BIOPSY   GROSS:      A  RECEIVED IN FORMALIN AND LABELED WITH PATIENT          IDENTIFICATION, CONSISTS OF FRAGMENTS OF MUCOID, RED-TAN,          SOFT TISSUE AND BLOOD MEASURING 1 7 X 1 5 X 0 2 CM IN          AGGREGATE   THE SPECIMEN IS FILTERED AND SUBMITTED IN ONE          CASSETTE  DIAGNOSIS:      A  ATYPICAL COMPLEX HYPERPLASIA      7/10/2019 Initial Diagnosis     Endometrial cancer ([facility] [address] )      7/10/2019 Surgery     Total abdominal hysterectomy bilateral salpingo-oophorectomy performed by Dr Sarah Shah for stage IB grade 1 endometrial cancer  Depth of invasion 7 of 9 mm  No lymph vascular space invasion  Patient is 69years old        8/13/2019 -  Cancer Staged     Staging form: Corpus Uteri - Carcinoma, AJCC 8th Edition  - Pathologic stage from 8/13/2019: FIGO Stage IB, calculated as Stage Unknown (pT1b, pNX, cM0) - Signed by Yecenia Nuñez MD on 2019  Residual tumor (R): R0 - None  Stage used in treatment planning: Yes  National guidelines used in treatment planning: Yes  Type of national guideline used in treatment planning: NCCN       Clinical Trial: no    OB/GYN History:  The patient underwent menarche at 11 years  Menopause Status Post  No LMP recorded  Patient has had a hysterectomy  Menopause at  approx 52 years  Menopause Reason Natural  Hormone replacement therapy: yes  Years used 8 months   3   Para 3   Age at first delivery being 27 years  Nursing: no    Birth control pills: yes  Years used 6 years    Health Maintenance   Topic Date Due    MAMMOGRAM  2019    INFLUENZA VACCINE  2019    Fall Risk  2020    Urinary Incontinence Screening  2020    Medicare Annual Wellness Visit (AWV)  2020    BMI: Followup Plan  2020    BMI: Adult  2020    DTaP,Tdap,and Td Vaccines (3 - Td) 2021    CRC Screening: Colonoscopy  2021    Hepatitis C Screening  Completed    Pneumococcal Vaccine: 65+ Years  Completed    Pneumococcal Vaccine: Pediatrics (0 to 5 Years) and At-Risk Patients (6 to 59 Years)  Aged Out    HEPATITIS B VACCINES  Aged Out       Past Medical History:   Diagnosis Date    Abnormal blood chemistry     abnormal biochemistry findings    Abnormal findings on diagnostic imaging of urinary organs     resolved 2016    Abnormal glucose     resolved 2016    Abnormal thyroid exam     resolved 2016    Abnormal thyroid screen (blood)     resolved 2016    Abnormal TSH     last assessed 2016    Allergic     Allergic rhinitis     last assessed 2015    Anemia     Arthritis     Asthma     Closed Colles' fracture     right wrist, last assessed 2014    Coronary artery disease     "blockages"  in left carotid artery  - not in the heart(per pt)            sees Dr Pedro Proctor Angles Depression     with insomnia    Diverticulosis     occ diverticulitis    Edema     last assessed 03/11/2015    Environmental allergies     GERD (gastroesophageal reflux disease)     Hematuria     last assessed 11/07/2013    Hiatal hernia     History of transfusion     had 2 units-11/16    Hyperlipidemia     Hypertension     Iron deficiency anemia     chronic-receives iron infusion usually every 6 months    Ischemic colitis ([facility] [address] )     last assessed 12/07/2016    Knee mass     last assessed 06/11/2014    Orthostatic hypotension     last assessed 03/26/2014    Osteoarthrosis of knee     last assessed 11/12/2014    Palpitations     PMB (postmenopausal bleeding)     last assessed 11/07/2013    Polyarthralgia     last assessed 06/09/2014    Posthemorrhagic anemia     last assessed 11/07/2013       Past Surgical History:   Procedure Laterality Date    ABDOMINAL ADHESION SURGERY N/A 7/10/2019    Procedure: EXTENSIVE LYSIS ADHESIONS, OVER SEW SEROSAL TEAR, CLOSURE MESENTERIC DEFECT;  Surgeon: Kalli Rucker MD;  Location: [address] [place] [address];  Service: General    APPENDECTOMY      BREAST SURGERY Bilateral     exc  lashawn masses-benign    CATARACT EXTRACTION      CATARACT EXTRACTION W/ INTRAOCULAR LENS IMPLANT Right 2/6/2017    Procedure: EXTRACTION EXTRACAPSULAR CATARACT PHACO INTRAOCULAR LENS (IOL); Surgeon: Dexter Lombardi MD;  Location: [facility] MAIN OR;  Service:     CATARACT EXTRACTION W/ INTRAOCULAR LENS IMPLANT Left 1/9/2017    Procedure: EXTRACTION EXTRACAPSULAR CATARACT PHACO INTRAOCULAR LENS (IOL); Surgeon: Dexter Lombardi MD;  Location: [facility] MAIN OR;  Service:     CHOLECYSTECTOMY      lap    COLONOSCOPY N/A 12/3/2016    Procedure: COLONOSCOPY;  Surgeon: Ramesh Valiente MD;  Location: [facility] GI LAB; Service:     CYSTOSCOPY  06/2011    bladder biopsy, lashawn  retrogrades    DILATION AND CURETTAGE OF UTERUS      x2    ESOPHAGOGASTRODUODENOSCOPY N/A 12/2/2016    Procedure: ESOPHAGOGASTRODUODENOSCOPY (EGD);   Surgeon: Gris Adrian MD;  Location: [facility] GI LAB;  Service:     ESOPHAGOGASTRODUODENOSCOPY N/A 6/5/2017    Procedure: ESOPHAGOGASTRODUODENOSCOPY (EGD); Surgeon: Yao Cottrell MD;  Location: [facility] GI LAB; Service:     FRACTURE SURGERY Right     distal radius repair w/hardware    HAND TENDON SURGERY Right     laceration repair    HERNIA REPAIR      umbilical    HYSTERECTOMY  07/10/2019    IR IMAGE GUIDED BIOPSY/ASPIRATION LIVER  12/12/2018    KNEE SURGERY Right 07/03/2014    mass removed    PELVIC LAPAROSCOPY Bilateral 7/10/2019    Procedure: SALPINGO-OOPHORECTOMY, LAPAROSCOPIC;  Surgeon: Zenaida García MD;  Location: [address] [place] [address];  Service: Gynecology    ID LAP,VAG HYST,UTERUS 250GMS/<,SALP-OOPH N/A 7/10/2019    Procedure: HYSTERECTOMY LAPAROSCOPIC ASSISTED VAGINAL (LAVH);   Surgeon: Zenaida García MD;  Location: [address] [place] [address];  Service: Gynecology    TOTAL SHOULDER ARTHROPLASTY Right 7/14/2016    Procedure: ARTHROPLASTY SHOULDER with subacromial decompression, distal clavicle excision and possible rotator cuff repair,limited debridement of laboral tear;  Surgeon: Jasvir Spangler MD;  Location: [facility] MAIN OR;  Service:     WRIST SURGERY Right 12/2013    repair fracture with hardware       Family History   Problem Relation Age of Onset    Cancer Father [de-identified]        colon     Hypertension Father     Cancer Paternal Grandmother         breast    Stroke Mother         TIA       Social History     Tobacco Use    Smoking status: Never Smoker    Smokeless tobacco: Never Used   Substance Use Topics    Alcohol use: Not Currently     Frequency: Never     Drinks per session: Patient refused     Binge frequency: Never    Drug use: No          Current Outpatient Medications:     amLODIPine (NORVASC) 2 5 mg tablet, Take 1 tablet (2 5 mg total) by mouth daily, Disp: 90 tablet, Rfl: 3    aspirin 81 MG tablet, Take 81 mg by mouth daily at bedtime Last dose6/26/19, Disp: , Rfl:     atenolol (TENORMIN) 25 mg tablet, Take 1 tablet (25 mg total) by mouth daily at bedtime, Disp: 90 tablet, Rfl: 3    atorvastatin (LIPITOR) 10 mg tablet, Take 1 tablet (10 mg total) by mouth daily at bedtime, Disp: 90 tablet, Rfl: 3    Calcium-Vitamin D (CALTRATE 600 PLUS-VIT D PO), Take by mouth daily at bedtime  , Disp: , Rfl:     Fexofenadine HCl (ALLEGRA PO), Take 10 mg by mouth as needed  , Disp: , Rfl:     pantoprazole (PROTONIX) 40 mg tablet, Take 1 tablet (40 mg total) by mouth 2 (two) times a day, Disp: 60 tablet, Rfl: 11    PARoxetine (PAXIL) 20 mg tablet, Take 1 tablet (20 mg total) by mouth daily (Patient taking differently: Take 20 mg by mouth daily at bedtime ), Disp: 90 tablet, Rfl: 3    VENTOLIN  (90 Base) MCG/ACT inhaler, Inhale 2 puffs every 6 (six) hours as needed for wheezing, Disp: 3 Inhaler, Rfl: 3    zolpidem (AMBIEN) 5 mg tablet, Take 1 tablet (5 mg total) by mouth daily at bedtime as needed for sleep, Disp: 30 tablet, Rfl: 1  No current facility-administered medications for this visit  Allergies   Allergen Reactions    Shellfish-Derived Products Anaphylaxis     seafood    Sulfa Antibiotics Anaphylaxis        Review of Systems:  Review of Systems   Constitutional: Positive for fatigue (Moderate)  HENT: Positive for trouble swallowing (History of hiatal hernia)  Eyes: Negative  Respiratory: Negative  Cardiovascular: Negative  Gastrointestinal: Negative  Endocrine: Negative  Genitourinary: Negative for vaginal bleeding, vaginal discharge and vaginal pain  Musculoskeletal: Positive for arthralgias (Knees)  Skin: Negative  Allergic/Immunologic: Negative  Neurological: Positive for dizziness and light-headedness  Hematological: Bruises/bleeds easily  Psychiatric/Behavioral: Negative          Vitals:    08/16/19 1023   BP: 138/70   BP Location: Left arm   Patient Position: Sitting   Cuff Size: Standard   Pulse: 79   Resp: 18   Temp: 98 1 °F (36 7 °C)   TempSrc: Temporal   Weight: 81 6 kg (180 lb)   Height: 5' 7" (1 702 m) Pain assessment: 0    Pain Score: 0-No pain    Imaging:No images are attached to the encounter  Teaching NCI packet    HDR

## 2019-08-16 NOTE — PROGRESS NOTES
Consultation - Radiation Oncology     [facility]:[id number] : 1945  Encounter: [id number]  [facility]  Chief Complaint   Patient presents with    Consult     Endometrial Cancer     Cancer Staging  Endometrial cancer [facility])  Staging form: Corpus Uteri - Carcinoma, AJCC 8th Edition  - Clinical: No stage assigned - Unsigned  - Pathologic stage from 2019: FIGO Stage IB, calculated as Stage Unknown (pT1b, pNX, cM0) - Signed by Bryson Hudson MD on 2019  Residual tumor (R): R0 - None  Stage used in treatment planning: Yes  National guidelines used in treatment planning: Yes  Type of national guideline used in treatment planning: NCCN           History of Present Illness   Charity Gonzalez is a 69y o  year old female who presents with recently diagnosed incidentally found endometrial cancer status post total abdominal hysterectomy bilateral salpingo oophorectomy for what was thought to be benign endometrial hyperplasia  She presents today to discuss adjuvant vaginal cuff radiation therapy  Workup to date as described below:    Patient developed intermittent postmenopausal bleeding for approximately 8 months  19 endometrial biopsy identified complex endometrial hyperplasia  7/10/19 Underwent Total abdominal hysterectomy bilateral salpingo-oophorectomy performed by Dr Zenaida García for stage IB grade 1 endometrial cancer  Depth of invasion 7 of 9 mm  No lymph vascular space invasion  19 CT Abd+Pelvis: No evidence of locoregional disease  19 Consult with Dr Nuha Burnham:  Discussed options of further evaluation to include surgical lymph node resection verses CT scan imaging  Recommended CT scan imaging  Discussed adjuvant treatment options including no treatment versus radiation  Recommended vaginal brachytherapy  Referred to Radiation Oncology  8/15/19 CT of abdomen and pelvis - Result pending          Historical Information   Oncology History              [facility]    5/9/2019 Biopsy     PATHOLOGY REPORT   Eric Mcallister SURGICAL #: [id number], [id number]   PATIENT ID: [id number]   SPECIMEN SOURCE: Endometrium   CLINICAL DATA: Provided Diagnosis Codes: N95 0   LMP: 01/01/1993   CLINICAL:      A  ENDOMETRIAL BIOPSY   GROSS:      A  RECEIVED IN FORMALIN AND LABELED WITH PATIENT          IDENTIFICATION, CONSISTS OF FRAGMENTS OF MUCOID, RED-TAN,          SOFT TISSUE AND BLOOD MEASURING 1 7 X 1 5 X 0 2 CM IN          AGGREGATE   THE SPECIMEN IS FILTERED AND SUBMITTED IN ONE          CASSETTE  DIAGNOSIS:      A  ATYPICAL COMPLEX HYPERPLASIA      7/10/2019 Initial Diagnosis     [facility]omet[facility] ([facility] )      7/10/2019 Surgery     Total abdominal hysterectomy bilateral salpingo-oophorectomy performed by Dr Sarah Shah for stage IB grade 1 endometrial cancer  Depth of invasion 7 of 9 mm  No lymph vascular space invasion  Patient is 69years old        8/13/2019 -  Cancer Staged     Staging form: Corpus Uteri - Carcinoma, AJCC 8th Edition  - Pathologic stage from 8/13/2019: FIGO Stage IB, calculated as Stage Unknown (pT1b, pNX, cM0) - Signed by Yecenia Nuñez MD on 8/13/2019  Residual tumor (R): R0 - None  Stage used in treatment planning: Yes  National guidelines used in treatment planning: Yes  Type of national guideline used in treatment planning: NCCN             Past Medical History:   Diagnosis Date    Abnormal blood chemistry     abnormal biochemistry findings    Abnormal findings on diagnostic imaging of urinary organs     resolved 07/01/2016    Abnormal glucose     resolved 07/01/2016    Abnormal thyroid exam     resolved 07/01/2016    Abnormal thyroid screen (blood)     resolved 07/01/2016    Abnormal TSH     last assessed 03/07/2016    Allergic     Allergic rhinitis     last assessed 06/25/2015    Anemia     Arthritis     Asthma     Closed Colles' fracture     right wrist, last assessed 01/17/2014    Coronary artery disease "blockages"  in left carotid artery  - not in the heart(per pt)            sees Dr Thomson Masters  Jorge Palacios Depression     with insomnia    Diverticulosis     occ diverticulitis    Edema     last assessed 03/11/2015    Environmental allergies     GERD (gastroesophageal reflux disease)     Hematuria     last assessed 11/07/2013    Hiatal hernia     History of transfusion     had 2 units-11/16    Hyperlipidemia     Hypertension     Iron deficiency anemia     chronic-receives iron infusion usually every 6 months    Ischemic colitis ([address] )     last assessed 12/07/2016    Knee mass     last assessed 06/11/2014    Orthostatic hypotension     last assessed 03/26/2014    Osteoarthrosis of knee     last assessed 11/12/2014    Palpitations     PMB (postmenopausal bleeding)     last assessed 11/07/2013    Polyarthralgia     last assessed 06/09/2014    Posthemorrhagic anemia     last assessed 11/07/2013     Past Surgical History:   Procedure Laterality Date    ABDOMINAL ADHESION SURGERY N/A 7/10/2019    Procedure: EXTENSIVE LYSIS ADHESIONS, OVER SEW SEROSAL TEAR, CLOSURE MESENTERIC DEFECT;  Surgeon: Manju Pena MD;  Location: [address] [place] [address];  Service: General    APPENDECTOMY      BREAST SURGERY Bilateral     exc  lashawn masses-benign    CATARACT EXTRACTION      CATARACT EXTRACTION W/ INTRAOCULAR LENS IMPLANT Right 2/6/2017    Procedure: EXTRACTION EXTRACAPSULAR CATARACT PHACO INTRAOCULAR LENS (IOL); Surgeon: Morgan Soria MD;  Location: [facility] OR;  Service:     CATARACT EXTRACTION W/ INTRAOCULAR LENS IMPLANT Left 1/9/2017    Procedure: EXTRACTION EXTRACAPSULAR CATARACT PHACO INTRAOCULAR LENS (IOL); Surgeon: Morgan Soria MD;  Location: [facility] OR;  Service:     CHOLECYSTECTOMY      lap    COLONOSCOPY N/A 12/3/2016    Procedure: COLONOSCOPY;  Surgeon: Mag Perdue MD;  [facility];   Service:     CYSTOSCOPY  06/2011    bladder biopsy, lashawn  retrogrades    DILATION AND CURETTAGE OF UTERUS x2    ESOPHAGOGASTRODUODENOSCOPY N/A 12/2/2016    Procedure: ESOPHAGOGASTRODUODENOSCOPY (EGD); Surgeon: Marylene Alstrom, MD;  Location: [facility] GI LAB; Service:     ESOPHAGOGASTRODUODENOSCOPY N/A 6/5/2017    Procedure: ESOPHAGOGASTRODUODENOSCOPY (EGD); Surgeon: Jalen Bhat MD;  Location: [facility] GI LAB; Service:     FRACTURE SURGERY Right     distal radius repair w/hardware    HAND TENDON SURGERY Right     laceration repair    HERNIA REPAIR      umbilical    HYSTERECTOMY  07/10/2019    IR IMAGE GUIDED BIOPSY/ASPIRATION LIVER  12/12/2018    KNEE SURGERY Right 07/03/2014    mass removed    PELVIC LAPAROSCOPY Bilateral 7/10/2019    Procedure: SALPINGO-OOPHORECTOMY, LAPAROSCOPIC;  Surgeon: Epi Alvarez MD;  Location: [address] [place] [address];  Service: Gynecology    NC LAP,VAG HYST,UTERUS 250GMS/<,SALP-OOPH N/A 7/10/2019    Procedure: HYSTERECTOMY LAPAROSCOPIC ASSISTED VAGINAL (LAVH);   Surgeon: Epi Alvarez MD;  Location: [address] [place] [address];  Service: Gynecology    TOTAL SHOULDER ARTHROPLASTY Right 7/14/2016    Procedure: ARTHROPLASTY SHOULDER with subacromial decompression, distal clavicle excision and possible rotator cuff repair,limited debridement of laboral tear;  Surgeon: Osei Reyna MD;  Location: [facility] MAIN OR;  Service:     WRIST SURGERY Right 12/2013    repair fracture with hardware       Family History   Problem Relation Age of Onset    Cancer Father [de-identified]        colon     Hypertension Father     Cancer Paternal Grandmother         breast    Stroke Mother         TIA       Social History   Social History     Substance and Sexual Activity   Alcohol Use Not Currently    Frequency: Never    Drinks per session: Patient refused    Binge frequency: Never     Social History     Substance and Sexual Activity   Drug Use No     Social History     Tobacco Use   Smoking Status Never Smoker   Smokeless Tobacco Never Used     OB/GYN History:  The patient underwent menarche at 7 years  Menopause Status Post  No LMP recorded  Patient has had a hysterectomy  Menopause at  approx 52 years  Menopause Reason Natural  Hormone replacement therapy: yes  Years used 8 months   3   Para 3   Age at first delivery being 27 years  Nursing: no    Birth control pills: yes  Years used 6 years      Meds/Allergies     Current Outpatient Medications:     amLODIPine (NORVASC) 2 5 mg tablet, Take 1 tablet (2 5 mg total) by mouth daily, Disp: 90 tablet, Rfl: 3    aspirin 81 MG tablet, Take 81 mg by mouth daily at bedtime Last dose19, Disp: , Rfl:     atenolol (TENORMIN) 25 mg tablet, Take 1 tablet (25 mg total) by mouth daily at bedtime, Disp: 90 tablet, Rfl: 3    atorvastatin (LIPITOR) 10 mg tablet, Take 1 tablet (10 mg total) by mouth daily at bedtime, Disp: 90 tablet, Rfl: 3    Calcium-Vitamin D (CALTRATE 600 PLUS-VIT D PO), Take by mouth daily at bedtime  , Disp: , Rfl:     Fexofenadine HCl (ALLEGRA PO), Take 10 mg by mouth as needed  , Disp: , Rfl:     pantoprazole (PROTONIX) 40 mg tablet, Take 1 tablet (40 mg total) by mouth 2 (two) times a day, Disp: 60 tablet, Rfl: 11    PARoxetine (PAXIL) 20 mg tablet, Take 1 tablet (20 mg total) by mouth daily (Patient taking differently: Take 20 mg by mouth daily at bedtime ), Disp: 90 tablet, Rfl: 3    VENTOLIN  (90 Base) MCG/ACT inhaler, Inhale 2 puffs every 6 (six) hours as needed for wheezing, Disp: 3 Inhaler, Rfl: 3    zolpidem (AMBIEN) 5 mg tablet, Take 1 tablet (5 mg total) by mouth daily at bedtime as needed for sleep, Disp: 30 tablet, Rfl: 1  No current facility-administered medications for this visit  Allergies   Allergen Reactions    Shellfish-Derived Products Anaphylaxis     seafood    Sulfa Antibiotics Anaphylaxis         Review of Systems   Review of Systems   Constitutional: Positive for fatigue (Moderate)  HENT: Positive for trouble swallowing (History of hiatal hernia)  Eyes: Negative  Respiratory: Negative  Cardiovascular: Negative  Gastrointestinal: Has been experience bouts of diarrhea since surgery  Endocrine: Negative  Genitourinary: Negative for vaginal bleeding, vaginal discharge and vaginal pain  Musculoskeletal: Positive for arthralgias (Knees)  Skin: Negative  Allergic/Immunologic: Negative  Neurological: Positive for dizziness and light-headedness  Hematological: Bruises/bleeds easily  Psychiatric/Behavioral: Negative  OBJECTIVE:   /70 (BP Location: Left arm, Patient Position: Sitting, Cuff Size: Standard)   Pulse 79   Temp 98 1 °F (36 7 °C) (Temporal)   Resp 18   Ht 5' 7" (1 702 m)   Wt 81 6 kg (180 lb)   BMI 28 19 kg/m²   Pain Assessment:  0  Performance Status: Karnofsky: 90 - Able to carry on normal activity; minor signs or symptoms of disease     Physical Exam   The patient presents today no apparent distress  Sclera anicteric  No palpable cervical or supraclavicular lymphadenopathy  Lungs clear to auscultation  Abdomen soft nontender  No swelling of the lower extremities  Normal appearing external genitalia  The vaginal cuff is well healed without visible or palpable suspicious findings        RESULTS  Lab Results    Chemistry        Component Value Date/Time     10/18/2016 0756    K 3 9 07/24/2019 0501    K 4 7 10/18/2016 0756     07/24/2019 0501     10/18/2016 0756    CO2 25 07/24/2019 0501    CO2 26 10/18/2016 0756    BUN 3 (L) 07/24/2019 0501    BUN 16 10/18/2016 0756    CREATININE 0 59 (L) 07/24/2019 0501    CREATININE 0 55 (L) 10/18/2016 0756        Component Value Date/Time    CALCIUM 8 6 07/24/2019 0501    CALCIUM 9 3 10/18/2016 0756    ALKPHOS 135 (H) 07/22/2019 0808    ALKPHOS 223 (H) 10/18/2016 0756    AST 13 07/22/2019 0808    AST 28 10/18/2016 0756    ALT 18 07/22/2019 0808    ALT 34 (H) 10/18/2016 0756    BILITOT 0 3 10/18/2016 0756            Lab Results   Component Value Date    WBC 9 45 07/24/2019    HGB 9 4 (L) 07/24/2019    HCT 32 7 (L) 07/24/2019    MCV 81 (L) 07/24/2019     (H) 07/24/2019         Imaging Studies  Ct Abdomen Pelvis Wo Contrast    Result Date: 7/21/2019  Narrative: CT ABDOMEN AND PELVIS WITHOUT IV CONTRAST INDICATION:   Abdominal pain, unspecified  Recent hysterectomy 2 weeks ago  Review of chart shows the patient has coffee-ground emesis, as well  COMPARISON:  CT of the abdomen/pelvis dated 7/10/2008  TECHNIQUE:  CT examination of the abdomen and pelvis was performed without intravenous contrast   Axial, sagittal, and coronal 2D reformatted images were created from the source data and submitted for interpretation  Radiation dose length product (DLP) for this visit:  753 66 mGy-cm   This examination, like all CT scans performed in the [facility], was performed utilizing techniques to minimize radiation dose exposure, including the use of iterative  reconstruction and automated exposure control  Enteric contrast was administered  FINDINGS: ABDOMEN LOWER CHEST:  Atelectasis in the left lower lobe and lingula  LIVER/BILIARY TREE:  2 9 cm fluid density lesion in the anterior left lobe cyst  No suspicious hepatic masses  Size and contour are normal  No biliary ductal dilatation  GALLBLADDER:  Gallbladder is surgically absent  SPLEEN:  Calcified granulomata are noted in the spleen  No suspicious splenic mass  PANCREAS:  Unremarkable  ADRENAL GLANDS:  Unremarkable  KIDNEYS/URETERS:  Mild cortical volume loss  No contour distorting masses, perinephric collections, urolithiasis, or hydronephrosis  STOMACH AND BOWEL:  Large hiatal hernia, potentially with combined-type volvulus  No inflammatory changes to suggest strangulation  No dilated or inflamed loops of small bowel  Oral contrast progresses to the cecum  Colonic diverticulosis without diverticulitis  APPENDIX:  Surgically absent  ABDOMINOPELVIC CAVITY:  Strandy edema within the central mesentery  Small amount of free pelvic fluid measuring simple in density  No encapsulated collections or free air  No mesenteric, retroperitoneal, or pelvic sidewall lymphadenopathy  VESSELS:  Aortoiliac atherosclerosis without aneurysm  PELVIS REPRODUCTIVE ORGANS:  Patient is status post hysterectomy  No suspicious adnexal masses  URINARY BLADDER:  Unremarkable  ABDOMINAL WALL/INGUINAL REGIONS:  Bilateral subcutaneous collection superficial to the abdominal wall musculature at the site of low transverse incision measuring 6 0 cm x 1 1 cm  No CT significant inflammation around the collection  OSSEOUS STRUCTURES:  No acute fracture or destructive osseous lesion  Impression: 1   3   Large hiatal hernia with combined organoaxial and mesenteroaxial volvulus  No significant wall thickening or surrounding inflammatory changes to suggest strangulation  In the setting of coffee-ground emesis, endoscopy should be considered  2   Postsurgical changes from hysterectomy  Small amount of free pelvic fluid probably within normal limits for recent surgery  No encapsulated collections  3   Small bilobed acute collection in the anterior subcutaneous tissues at the patient's low transverse incision  Lack of surrounding inflammation speaks against abscess  This could represent small seroma or hematoma  The study was marked in EPIC for significant notification  Workstation performed: [id number]     Xr Abdomen Obstruction Series    Result Date: 7/21/2019  Narrative: OBSTRUCTION SERIES INDICATION:   vomiting  Nausea COMPARISON:  Upper GI on 8324 2017 EXAM PERFORMED/VIEWS:  XR ABDOMEN OBSTRUCTION SERIES Images: 7 FINDINGS: There is a large hiatal hernia present  There is an air-fluid level within the hernia  There are scattered mildly dilated small bowel loops throughout the abdomen  There are scattered air-fluid levels present  There is no large bowel distention  There is air and feces present in the cecum and sigmoid which are not dilated  No free air beneath the hemidiaphragms   No pathologic calcifications or soft tissue masses evident  Osseous structures are unremarkable  Examination of the chest reveals a normal cardiomediastinal silhouette  Lungs are clear  Calcified granuloma in the right upper lobe     Impression: 1  Large hiatal hernia with an air-fluid level  2   Scattered mildly dilated small bowel loops with air-fluid levels  Recommend CT scan of the abdomen and pelvis to exclude obstruction  Workstation performed: [id number]     Fl Upper Gi Ugi    Result Date: 8/2/2019  Narrative: UPPER GI SERIES  DOUBLE CONTRAST INDICATION:  Hiatal hernia  COMPARISON:  None IMAGES:  42 FLUOROSCOPY TIME:   2 3 MIN TECHNIQUE:  The patient was given effervescent granules and barium  by mouth and images of the esophagus, stomach, and small bowel were obtained  FINDINGS: The esophagus is prominent in caliber  There are tertiary contractions  Emptying of contrast from the esophagus is prompt  There is no mucosal mass, ulceration or fold thickening identified  There is a large hiatal hernia, with half the stomach above the diaphragm  The stomach is unremarkable in size  The gastric mucosa is normal   No penetrating ulcers or masses  Contrast empties promptly into the duodenum  The duodenum is normal in caliber  The ligament of Treitz/duodenojejunal junction lies in a normal position  Gastroesophageal reflux was not observed  Impression: 1  Large lateral hernia, with half the stomach above the diaphragm  Workstation performed: [id number]         ASSESSMENT  1   Endometrial cancer [facility])       Cancer Staging  Endometrial cancer ([facility] )  Staging form: Corpus Uteri - Carcinoma, AJCC 8th Edition  - Clinical: No stage assigned - Unsigned  - Pathologic stage from 8/13/2019: FIGO Stage IB, calculated as Stage Unknown (pT1b, pNX, cM0) - Signed by Jessika Yu MD on 8/13/2019  Residual tumor (R): R0 - None  Stage used in treatment planning: Yes  National guidelines used in treatment planning: Yes  Type of national guideline used in treatment planning: NCCN        PLAN/DISCUSSION  No orders of the defined types were placed in this encounter  Art Monroe is a 69y o  year old female with recently diagnosed incidentally found endometrial cancer status post total abdominal hysterectomy bilateral salpingo oophorectomy for what was thought to be complex hyperplasia, Grade 1, FIGO Stage IB, calculated as Stage Unknown (pT1b, pNX, cM0)  CT of the abdomen and pelvis is negative for any obvious lymph node involvement  There was no lymphovascular space invasion on pathology  We agree with the recommendation from Gynecologic Oncology for adjuvant vaginal cuff radiation therapy  We intend on delivering a dose of 3000 cGy in 5 fractions to the vaginal surface with treatment delivered twice per week  The associated risks and toxicities of vaginal cuff radiation were discussed with the patient in detail, including, but not limited to, fatigue, diarrhea, dysuria, long-term vaginal stenosis and dryness, and a very small risk of long-term radiation proctitis/cystitis/small bowel injury  The patient will return within the next week for CT planning with treatment to begin shortly thereafter  Henry Beltran MD  8/16/2019,11:23 AM      Portions of the record may have been created with voice recognition software   Occasional wrong word or "sound a like" substitutions may have occurred due to the inherent limitations of voice recognition software   Read the chart carefully and recognize, using context, where substitutions have occurred

## 2019-08-16 NOTE — TELEPHONE ENCOUNTER
latesha williamson    Refill Request for Medication: pantoprazole    Dose: 40mg 2x daily    Quantity:     Pharmacy: [facility] pharmacy

## 2019-08-21 DIAGNOSIS — G47.00 INSOMNIA, UNSPECIFIED TYPE: ICD-10-CM

## 2019-08-22 RX ORDER — ZOLPIDEM TARTRATE 5 MG/1
5 TABLET ORAL
Qty: 30 TABLET | Refills: 1 | Status: SHIPPED | OUTPATIENT
Start: 2019-08-22 | End: 2019-10-21 | Stop reason: SDUPTHER

## 2019-08-23 ENCOUNTER — APPOINTMENT (OUTPATIENT)
Dept: RADIATION ONCOLOGY | Facility: HOSPITAL | Age: 74
End: 2019-08-23
Attending: RADIOLOGY
Payer: MEDICARE

## 2019-08-23 DIAGNOSIS — C54.1 ENDOMETRIAL CANCER (HCC): ICD-10-CM

## 2019-08-23 PROCEDURE — 77470 SPECIAL RADIATION TREATMENT: CPT | Performed by: RADIOLOGY

## 2019-08-23 PROCEDURE — 77290 THER RAD SIMULAJ FIELD CPLX: CPT | Performed by: RADIOLOGY

## 2019-08-23 PROCEDURE — 77332 RADIATION TREATMENT AID(S): CPT | Performed by: RADIOLOGY

## 2019-08-28 ENCOUNTER — OFFICE VISIT (OUTPATIENT)
Dept: OBGYN CLINIC | Facility: CLINIC | Age: 74
End: 2019-08-28

## 2019-08-28 VITALS
DIASTOLIC BLOOD PRESSURE: 68 MMHG | WEIGHT: 177 LBS | HEIGHT: 67 IN | SYSTOLIC BLOOD PRESSURE: 128 MMHG | BODY MASS INDEX: 27.78 KG/M2

## 2019-08-28 DIAGNOSIS — Z90.722 S/P TAH-BSO (TOTAL ABDOMINAL HYSTERECTOMY AND BILATERAL SALPINGO-OOPHORECTOMY): ICD-10-CM

## 2019-08-28 DIAGNOSIS — Z48.816 AFTERCARE FOLLOWING SURGERY OF THE GENITOURINARY SYSTEM, NEC: Primary | ICD-10-CM

## 2019-08-28 DIAGNOSIS — Z90.710 S/P TAH-BSO (TOTAL ABDOMINAL HYSTERECTOMY AND BILATERAL SALPINGO-OOPHORECTOMY): ICD-10-CM

## 2019-08-28 DIAGNOSIS — C54.1 ENDOMETRIAL CANCER (HCC): ICD-10-CM

## 2019-08-28 DIAGNOSIS — Z90.79 S/P TAH-BSO (TOTAL ABDOMINAL HYSTERECTOMY AND BILATERAL SALPINGO-OOPHORECTOMY): ICD-10-CM

## 2019-08-28 PROCEDURE — 99024 POSTOP FOLLOW-UP VISIT: CPT | Performed by: OBSTETRICS & GYNECOLOGY

## 2019-08-28 PROCEDURE — 77370 RADIATION PHYSICS CONSULT: CPT | Performed by: RADIOLOGY

## 2019-08-28 PROCEDURE — 77295 3-D RADIOTHERAPY PLAN: CPT | Performed by: RADIOLOGY

## 2019-08-28 PROCEDURE — 1124F ACP DISCUSS-NO DSCNMKR DOCD: CPT | Performed by: OBSTETRICS & GYNECOLOGY

## 2019-08-28 NOTE — PROGRESS NOTES
Assessment/Plan:    No problem-specific Assessment & Plan notes found for this encounter  {Assess/PlanSmartLinks:09542}      Subjective:      Patient ID: Juve Fuller is a 69 y o  female  HPI    {Common ambulatory SmartLinks:85170}    Review of Systems   Constitutional: Negative for chills, fatigue, fever and unexpected weight change  HENT: Negative for dental problem, sinus pressure and sinus pain  Eyes: Negative for visual disturbance  Respiratory: Negative for cough, shortness of breath and wheezing  Cardiovascular: Negative for chest pain and leg swelling  Gastrointestinal: Negative for constipation, diarrhea, nausea and vomiting  Genitourinary: Negative for menstrual problem, pelvic pain and urgency  Musculoskeletal: Negative for back pain  Allergic/Immunologic: Negative for environmental allergies  Neurological: Negative for dizziness and headaches           Objective:      /68 (BP Location: Left arm, Patient Position: Sitting, Cuff Size: Large)   Ht 5' 7" (1 702 m)   Wt 80 3 kg (177 lb)   BMI 27 72 kg/m²          Physical Exam

## 2019-08-28 NOTE — PROGRESS NOTES
Assessment/Plan:      Diagnoses and all orders for this visit:    Aftercare following surgery of the genitourinary system, NEC    Endometrial cancer ([facility] )    S/P DANNY-BSO (total abdominal hysterectomy and bilateral salpingo-oophorectomy)        Plan Amber Duffy will continue to follow up with radiation oncology and gynecological oncology over the next several years and we will see her after she is cleared from them as needed  She is released for all normal activity  Subjective:     Patient ID: Kelly Wynne is a 69 y o  female  Amber Duffy is here today for continued postoperative surveillance  She has however seen both gynecological oncology and Radiation Oncology and had internal exams with them  She has been fitted for her cone for the radiation therapy  She postoperatively is doing well  Her biggest complaint revolves more around her hiatal hernia and discomfort with that  She has no vaginal bleeding or discharge  Bladder is fine  Bowels are still sluggish she is using some mild laxatives to keep things regular  We again reviewed the procedure with her, the pathology and her upcoming treatments  Review of Systems   Constitutional: Negative for fatigue, fever and unexpected weight change  HENT: Negative for dental problem, mouth sores, nosebleeds, rhinorrhea, sinus pressure, sinus pain and sore throat  Eyes: Negative for pain, discharge and visual disturbance  Respiratory: Negative for cough, chest tightness, shortness of breath and wheezing  Cardiovascular: Negative for chest pain, palpitations and leg swelling  Gastrointestinal: Negative for blood in stool, constipation, diarrhea, nausea and vomiting  Endocrine: Negative for polydipsia  Genitourinary: Negative for difficulty urinating, dyspareunia, dysuria, menstrual problem, pelvic pain, urgency, vaginal discharge and vaginal pain  Musculoskeletal: Negative for arthralgias, back pain and joint swelling     Allergic/Immunologic: Negative for environmental allergies  Neurological: Negative for seizures, light-headedness and headaches  Hematological: Does not bruise/bleed easily  Psychiatric/Behavioral: Negative for sleep disturbance  The patient is not nervous/anxious  Objective:    /68 (BP Location: Left arm, Patient Position: Sitting, Cuff Size: Large)   Ht 5' 7" (1 702 m)   Wt 80 3 kg (177 lb)   BMI 27 72 kg/m²   The the a to   Physical Exam   Constitutional: She is oriented to person, place, and time  She appears well-developed and well-nourished  No distress  Older white female   Abdominal: Soft  She exhibits no distension  There is no tenderness  There is no guarding  Pfannenstiel incision well-healed   Neurological: She is alert and oriented to person, place, and time  Psychiatric: She has a normal mood and affect  Vitals reviewed

## 2019-08-30 ENCOUNTER — APPOINTMENT (OUTPATIENT)
Dept: RADIATION ONCOLOGY | Facility: HOSPITAL | Age: 74
End: 2019-08-30
Attending: RADIOLOGY
Payer: MEDICARE

## 2019-08-30 PROCEDURE — 77280 THER RAD SIMULAJ FIELD SMPL: CPT | Performed by: RADIOLOGY

## 2019-08-30 PROCEDURE — 57156 INS VAG BRACHYTX DEVICE: CPT | Performed by: RADIOLOGY

## 2019-08-30 PROCEDURE — C1717 BRACHYTX, NON-STR,HDR IR-192: HCPCS | Performed by: RADIOLOGY

## 2019-08-30 PROCEDURE — 77770 HDR RDNCL NTRSTL/ICAV BRCHTX: CPT | Performed by: RADIOLOGY

## 2019-08-30 PROCEDURE — 77336 RADIATION PHYSICS CONSULT: CPT | Performed by: RADIOLOGY

## 2019-09-03 ENCOUNTER — APPOINTMENT (OUTPATIENT)
Dept: RADIATION ONCOLOGY | Facility: HOSPITAL | Age: 74
End: 2019-09-03
Attending: RADIOLOGY
Payer: MEDICARE

## 2019-09-03 PROCEDURE — 57156 INS VAG BRACHYTX DEVICE: CPT | Performed by: RADIOLOGY

## 2019-09-03 PROCEDURE — 77336 RADIATION PHYSICS CONSULT: CPT | Performed by: RADIOLOGY

## 2019-09-03 PROCEDURE — 77770 HDR RDNCL NTRSTL/ICAV BRCHTX: CPT | Performed by: RADIOLOGY

## 2019-09-03 PROCEDURE — C1717 BRACHYTX, NON-STR,HDR IR-192: HCPCS | Performed by: RADIOLOGY

## 2019-09-03 PROCEDURE — 77280 THER RAD SIMULAJ FIELD SMPL: CPT | Performed by: RADIOLOGY

## 2019-09-06 ENCOUNTER — APPOINTMENT (OUTPATIENT)
Dept: RADIATION ONCOLOGY | Facility: HOSPITAL | Age: 74
End: 2019-09-06
Attending: RADIOLOGY
Payer: MEDICARE

## 2019-09-06 PROCEDURE — 77280 THER RAD SIMULAJ FIELD SMPL: CPT | Performed by: RADIOLOGY

## 2019-09-06 PROCEDURE — 57156 INS VAG BRACHYTX DEVICE: CPT | Performed by: RADIOLOGY

## 2019-09-06 PROCEDURE — 77770 HDR RDNCL NTRSTL/ICAV BRCHTX: CPT | Performed by: RADIOLOGY

## 2019-09-06 PROCEDURE — C1717 BRACHYTX, NON-STR,HDR IR-192: HCPCS | Performed by: RADIOLOGY

## 2019-09-09 PROCEDURE — 91010 ESOPHAGUS MOTILITY STUDY: CPT | Performed by: INTERNAL MEDICINE

## 2019-09-10 ENCOUNTER — APPOINTMENT (OUTPATIENT)
Dept: RADIATION ONCOLOGY | Facility: HOSPITAL | Age: 74
End: 2019-09-10
Attending: RADIOLOGY
Payer: MEDICARE

## 2019-09-10 PROCEDURE — 77770 HDR RDNCL NTRSTL/ICAV BRCHTX: CPT | Performed by: RADIOLOGY

## 2019-09-10 PROCEDURE — 57156 INS VAG BRACHYTX DEVICE: CPT | Performed by: RADIOLOGY

## 2019-09-10 PROCEDURE — C1717 BRACHYTX, NON-STR,HDR IR-192: HCPCS | Performed by: RADIOLOGY

## 2019-09-10 PROCEDURE — 77280 THER RAD SIMULAJ FIELD SMPL: CPT | Performed by: RADIOLOGY

## 2019-09-12 DIAGNOSIS — I10 BENIGN ESSENTIAL HYPERTENSION: ICD-10-CM

## 2019-09-12 RX ORDER — ATENOLOL 25 MG/1
25 TABLET ORAL
Qty: 90 TABLET | Refills: 3 | Status: SHIPPED | OUTPATIENT
Start: 2019-09-12 | End: 2020-09-14 | Stop reason: SDUPTHER

## 2019-09-13 ENCOUNTER — APPOINTMENT (OUTPATIENT)
Dept: RADIATION ONCOLOGY | Facility: HOSPITAL | Age: 74
End: 2019-09-13
Attending: RADIOLOGY
Payer: MEDICARE

## 2019-09-13 PROCEDURE — 77336 RADIATION PHYSICS CONSULT: CPT | Performed by: RADIOLOGY

## 2019-09-13 PROCEDURE — 57156 INS VAG BRACHYTX DEVICE: CPT | Performed by: RADIOLOGY

## 2019-09-13 PROCEDURE — 77770 HDR RDNCL NTRSTL/ICAV BRCHTX: CPT | Performed by: RADIOLOGY

## 2019-09-13 PROCEDURE — C1717 BRACHYTX, NON-STR,HDR IR-192: HCPCS | Performed by: RADIOLOGY

## 2019-09-13 PROCEDURE — 77280 THER RAD SIMULAJ FIELD SMPL: CPT | Performed by: RADIOLOGY

## 2019-09-23 ENCOUNTER — OFFICE VISIT (OUTPATIENT)
Dept: FAMILY MEDICINE CLINIC | Facility: CLINIC | Age: 74
End: 2019-09-23
Payer: MEDICARE

## 2019-09-23 VITALS
HEART RATE: 72 BPM | SYSTOLIC BLOOD PRESSURE: 110 MMHG | TEMPERATURE: 99.8 F | WEIGHT: 180 LBS | OXYGEN SATURATION: 97 % | DIASTOLIC BLOOD PRESSURE: 70 MMHG | HEIGHT: 67 IN | RESPIRATION RATE: 18 BRPM | BODY MASS INDEX: 28.25 KG/M2

## 2019-09-23 DIAGNOSIS — N39.0 ACUTE UTI: ICD-10-CM

## 2019-09-23 DIAGNOSIS — Z12.39 BREAST CANCER SCREENING: Primary | ICD-10-CM

## 2019-09-23 DIAGNOSIS — N89.8 VAGINAL DISCHARGE: ICD-10-CM

## 2019-09-23 LAB
SL AMB  POCT GLUCOSE, UA: NORMAL
SL AMB LEUKOCYTE ESTERASE,UA: ABNORMAL
SL AMB POCT BILIRUBIN,UA: ABNORMAL
SL AMB POCT BLOOD,UA: ABNORMAL
SL AMB POCT CLARITY,UA: CLEAR
SL AMB POCT COLOR,UA: ABNORMAL
SL AMB POCT KETONES,UA: ABNORMAL
SL AMB POCT NITRITE,UA: ABNORMAL
SL AMB POCT PH,UA: 5
SL AMB POCT SPECIFIC GRAVITY,UA: 1.02
SL AMB POCT URINE PROTEIN: ABNORMAL
SL AMB POCT UROBILINOGEN: 4

## 2019-09-23 PROCEDURE — 87086 URINE CULTURE/COLONY COUNT: CPT | Performed by: FAMILY MEDICINE

## 2019-09-23 PROCEDURE — 87070 CULTURE OTHR SPECIMN AEROBIC: CPT | Performed by: FAMILY MEDICINE

## 2019-09-23 PROCEDURE — 99213 OFFICE O/P EST LOW 20 MIN: CPT | Performed by: FAMILY MEDICINE

## 2019-09-23 PROCEDURE — 81003 URINALYSIS AUTO W/O SCOPE: CPT | Performed by: FAMILY MEDICINE

## 2019-09-23 RX ORDER — FLUCONAZOLE 150 MG/1
150 TABLET ORAL
Qty: 2 TABLET | Refills: 0 | Status: SHIPPED | OUTPATIENT
Start: 2019-09-23 | End: 2019-09-27

## 2019-09-23 NOTE — PROGRESS NOTES
Assessment/Plan:       Diagnoses and all orders for this visit:    Breast cancer screening  -     Mammo screening bilateral w cad; Future    Vaginal discharge  -     Genital Comprehensive Culture  -     fluconazole (DIFLUCAN) 150 mg tablet; Take 1 tablet (150 mg total) by mouth every third day for 2 doses given hx of endometrial cancer   -     POCT urine dip auto non-scope  -     Urine culture  - Will wait for culture results and treat accordingly  Should symptoms worsen, will have pt follow up with her gynecologist, Dr Carmine Hill  Subjective:      Patient ID: Paulo Newton is a 69 y o  female  HPI   Sandy Somers is a 68 yo F with history of endometrial cancer s/p DANNY-BSO and 5 rounds of radiation who presents today with greenish yellow, foul smelling discharge x 10 days which is progressively worsening  Having to wear a pad  Denies pelvic pain, fevers, chills, frequency, urgency, incontinence, flank pain, hematuria, vaginal bleeding, skin lesions  Does admit to having increased amount of urine than usual      The following portions of the patient's history were reviewed and updated as appropriate: allergies, current medications, past family history, past medical history, past social history, past surgical history and problem list     Review of Systems   Constitutional: Negative for activity change, appetite change, chills, diaphoresis, fatigue and fever  HENT: Negative  Respiratory: Negative for cough, choking, chest tightness, shortness of breath and wheezing  Cardiovascular: Negative for chest pain, palpitations and leg swelling  Gastrointestinal: Negative for abdominal distention, abdominal pain, constipation, diarrhea, nausea and vomiting  Genitourinary: Positive for vaginal discharge  Negative for difficulty urinating, dyspareunia, dysuria, enuresis, flank pain, frequency, menstrual problem, pelvic pain and urgency     Musculoskeletal: Negative for arthralgias, back pain, gait problem, joint swelling, myalgias, neck pain and neck stiffness  Skin: Negative  Neurological: Negative for dizziness, tremors, syncope, facial asymmetry, weakness, light-headedness, numbness and headaches  Psychiatric/Behavioral: Negative  Objective:      /70   Pulse 72   Temp 99 8 °F (37 7 °C)   Resp 18   Ht 5' 7" (1 702 m)   Wt 81 6 kg (180 lb)   SpO2 97%   BMI 28 19 kg/m²          Physical Exam   Constitutional: She is oriented to person, place, and time  She appears well-developed and well-nourished  No distress  Genitourinary: Pelvic exam was performed with patient supine  No labial fusion  There is no rash, tenderness, lesion or injury on the right labia  There is no rash, tenderness, lesion or injury on the left labia  No erythema, tenderness or bleeding in the vagina  No foreign body in the vagina  No signs of injury around the vagina  Vaginal discharge (thick, white  whiff test negative  ) found  Neurological: She is alert and oriented to person, place, and time  Skin: She is not diaphoretic

## 2019-09-25 LAB
BACTERIA GENITAL AEROBE CULT: NORMAL
BACTERIA UR CULT: ABNORMAL
BACTERIA UR CULT: ABNORMAL

## 2019-09-27 DIAGNOSIS — N39.0 ACUTE UTI: Primary | ICD-10-CM

## 2019-09-27 RX ORDER — AMOXICILLIN 500 MG/1
500 CAPSULE ORAL EVERY 12 HOURS SCHEDULED
Qty: 14 CAPSULE | Refills: 0 | Status: SHIPPED | OUTPATIENT
Start: 2019-09-27 | End: 2019-10-04

## 2019-10-14 ENCOUNTER — CLINICAL SUPPORT (OUTPATIENT)
Dept: RADIATION ONCOLOGY | Facility: HOSPITAL | Age: 74
End: 2019-10-14
Attending: RADIOLOGY
Payer: MEDICARE

## 2019-10-14 VITALS
HEIGHT: 67 IN | SYSTOLIC BLOOD PRESSURE: 140 MMHG | HEART RATE: 78 BPM | DIASTOLIC BLOOD PRESSURE: 90 MMHG | BODY MASS INDEX: 27.81 KG/M2 | RESPIRATION RATE: 18 BRPM | WEIGHT: 177.2 LBS | OXYGEN SATURATION: 95 % | TEMPERATURE: 97.9 F

## 2019-10-14 DIAGNOSIS — C54.1 ENDOMETRIAL CANCER (HCC): Primary | ICD-10-CM

## 2019-10-14 PROCEDURE — 99211 OFF/OP EST MAY X REQ PHY/QHP: CPT | Performed by: RADIOLOGY

## 2019-10-14 NOTE — PROGRESS NOTES
Ayaan Renee 1945 is a 69 y o  female     Follow up visit     Presents for one month  follow up post Vaginal Cuff RT, completed on 9/13/19    F/U with GYN on 8/28/19  Post op doing well  Gyn Onc scheduled for 11/13/19  [facility]    5/9/2019 Biopsy     PATHOLOGY REPORT   Elena Gayle SURGICAL #: [id number], [id number]   PATIENT ID: [id number]   SPECIMEN SOURCE: Endometrium   CLINICAL DATA: Provided Diagnosis Codes: N95 0   LMP: 01/01/1993   CLINICAL:      A  ENDOMETRIAL BIOPSY   GROSS:      A  RECEIVED IN FORMALIN AND LABELED WITH PATIENT          IDENTIFICATION, CONSISTS OF FRAGMENTS OF MUCOID, RED-TAN,          SOFT TISSUE AND BLOOD MEASURING 1 7 X 1 5 X 0 2 CM IN          AGGREGATE   THE SPECIMEN IS FILTERED AND SUBMITTED IN ONE          CASSETTE  DIAGNOSIS:      A  ATYPICAL COMPLEX HYPERPLASIA      7/10/2019 Initial Diagnosis     Endometrial cancer ([facility] )      7/10/2019 Surgery     Total abdominal hysterectomy bilateral salpingo-oophorectomy performed by Dr Sussy Davis for stage IB grade 1 endometrial cancer  Depth of invasion 7 of 9 mm  No lymph vascular space invasion  Patient is 69years old        8/13/2019 -  Cancer Staged     Staging form: Corpus Uteri - Carcinoma, AJCC 8th Edition  - Pathologic stage from 8/13/2019: FIGO Stage IB, calculated as Stage Unknown (pT1b, pNX, cM0) - Signed by Abdelrahman Griggs MD on 8/13/2019  Residual tumor (R): R0 - None  Stage used in treatment planning: Yes  National guidelines used in treatment planning: Yes  Type of national guideline used in treatment planning: NCCN        8/30/2019 - 9/13/2019 Radiation     : C1_HDR    Plan ID Energy Fractions Dose per Fraction (cGy) Dose Correction (cGy) Total Dose Delivered (cGy) Elapsed Days   Vag Cuff Ir192 5 / 5 600 0 3,000 14      Treatment dates:  C1_HDR: 8/30/2019 - 9/13/2019           Clinical Trial: no      Health Maintenance   Topic Date Due    MAMMOGRAM  06/12/2019    INFLUENZA VACCINE  07/01/2019    Fall Risk  01/07/2020    Urinary Incontinence Screening  01/07/2020    Medicare Annual Wellness Visit (AWV)  01/07/2020    BMI: Followup Plan  07/26/2020    BMI: Adult  09/23/2020    DTaP,Tdap,and Td Vaccines (3 - Td) 08/01/2021    CRC Screening: Colonoscopy  12/03/2021    Hepatitis C Screening  Completed    Pneumococcal Vaccine: 65+ Years  Completed    Pneumococcal Vaccine: Pediatrics (0 to 5 Years) and At-Risk Patients (6 to 59 Years)  Aged Out    HEPATITIS B VACCINES  Aged Out       Patient Active Problem List   Diagnosis    Anemia    Benign essential hypertension    Asymptomatic stenosis of left carotid artery    Hyperlipidemia    Mild pulmonary [facility] )    Asthma    Depression    Hiatal hernia    Primary insomnia    Vitamin B 12 deficiency    S/P DANNY-BSO (total abdominal hysterectomy and bilateral salpingo-oophorectomy)    Endometrial cancer (HCC)    Large hiatal hernia with possible volvulus    Aftercare following surgery of the genitourinary system, NEC     Past Medical History:   Diagnosis Date    Abnormal blood chemistry     abnormal biochemistry findings    Abnormal findings on diagnostic imaging of urinary organs     resolved 07/01/2016    Abnormal glucose     resolved 07/01/2016    Abnormal thyroid exam     resolved 07/01/2016    Abnormal thyroid screen (blood)     resolved 07/01/2016    Abnormal TSH     last assessed 03/07/2016    Allergic     Allergic rhinitis     last assessed 06/25/2015    Anemia     Arthritis     Asthma     Closed Colles' fracture     right wrist, last assessed 01/17/2014    Coronary artery disease     "blockages"  in left carotid artery  - not in the heart(per pt)            sees Dr Grabiel Silva [place] Depression     with insomnia    Diverticulosis     occ diverticulitis    Edema     last assessed 03/11/2015    Environmental allergies     GERD (gastroesophageal reflux disease)     Hematuria     last assessed 11/07/2013    Hiatal hernia  History of transfusion     had 2 units-11/16    Hyperlipidemia     Hypertension     Iron deficiency anemia     chronic-receives iron infusion usually every 6 months    Ischemic colitis ([facility] )     last assessed 12/07/2016    Knee mass     last assessed 06/11/2014    Orthostatic hypotension     last assessed 03/26/2014    Osteoarthrosis of knee     last assessed 11/12/2014    Palpitations     PMB (postmenopausal bleeding)     last assessed 11/07/2013    Polyarthralgia     last assessed 06/09/2014    Posthemorrhagic anemia     last assessed 11/07/2013     Past Surgical History:   Procedure Laterality Date    ABDOMINAL ADHESION SURGERY N/A 7/10/2019    Procedure: EXTENSIVE LYSIS ADHESIONS, OVER SEW SEROSAL TEAR, CLOSURE MESENTERIC DEFECT;  Surgeon: Sally Sanders MD;  Location: [address] [place] [address];  Service: General    APPENDECTOMY      BREAST SURGERY Bilateral     exc  lashawn masses-benign    CATARACT EXTRACTION      CATARACT EXTRACTION W/ INTRAOCULAR LENS IMPLANT Right 2/6/2017    Procedure: EXTRACTION EXTRACAPSULAR CATARACT PHACO INTRAOCULAR LENS (IOL); Surgeon: Citlalli Jacobs MD;  Location: [facility] MAIN OR;  Service:     CATARACT EXTRACTION W/ INTRAOCULAR LENS IMPLANT Left 1/9/2017    Procedure: EXTRACTION EXTRACAPSULAR CATARACT PHACO INTRAOCULAR LENS (IOL); Surgeon: Citlalli Jacobs MD;  Location: [facility] MAIN OR;  Service:     CHOLECYSTECTOMY      lap    COLONOSCOPY N/A 12/3/2016    Procedure: COLONOSCOPY;  Surgeon: Lillian Martinez MD;  Location: [place] [address] GI LAB; Service:     CYSTOSCOPY  06/2011    bladder biopsy, lashawn  retrogrades    DILATION AND CURETTAGE OF UTERUS      x2    ESOPHAGOGASTRODUODENOSCOPY N/A 12/2/2016    Procedure: ESOPHAGOGASTRODUODENOSCOPY (EGD); Surgeon: Brennan Coats MD;  Location: [facility] GI LAB; Service:     ESOPHAGOGASTRODUODENOSCOPY N/A 6/5/2017    Procedure: ESOPHAGOGASTRODUODENOSCOPY (EGD); Surgeon: Lillian Martinez MD;  Location: [facility] GI LAB;   Service:     FRACTURE SURGERY Right     distal radius repair w/hardware    HAND TENDON SURGERY Right     laceration repair    HERNIA REPAIR      umbilical    HYSTERECTOMY  07/10/2019    IR IMAGE GUIDED BIOPSY/ASPIRATION LIVER  12/12/2018    KNEE SURGERY Right 07/03/2014    mass removed    PELVIC LAPAROSCOPY Bilateral 7/10/2019    Procedure: SALPINGO-OOPHORECTOMY, LAPAROSCOPIC;  Surgeon: Bharath Foster MD;  Location: [address] [place] [address];  Service: Gynecology    [place] LAP,VAG HYST,UTERUS 250GMS/<,SALP-OOPH N/A 7/10/2019    Procedure: HYSTERECTOMY LAPAROSCOPIC ASSISTED VAGINAL (LAVH); Surgeon: Bharath Foster MD;  Location: [address] [place] [address];  Service: Gynecology    TOTAL SHOULDER ARTHROPLASTY Right 7/14/2016    Procedure: ARTHROPLASTY SHOULDER with subacromial decompression, distal clavicle excision and possible rotator cuff repair,limited debridement of laboral tear;  Surgeon: Sara Dennison MD;  [facility] MAIN OR;  Service:     WRIST SURGERY Right 12/2013    repair fracture with hardware     Family History   Problem Relation Age of Onset    Cancer Father [de-identified]        colon     Hypertension Father     Cancer Paternal Grandmother         breast    Stroke Mother         TIA     Social History     Socioeconomic History    Marital status:       Spouse name: Not on file    Number of children: 3    Years of education: Not on file    Highest education level: Not on file   Occupational History    Not on file   Social Needs    Financial resource strain: Not on file    Food insecurity:     Worry: Not on file     Inability: Not on file    Transportation needs:     Medical: Not on file     Non-medical: Not on file   Tobacco Use    Smoking status: Never Smoker    Smokeless tobacco: Never Used   Substance and Sexual Activity    Alcohol use: Not Currently     Frequency: Never     Drinks per session: Patient refused     Binge frequency: Never    Drug use: No    Sexual activity: Not Currently   Lifestyle    Physical activity:     Days per week: Not on file     Minutes per session: Not on file    Stress: Not on file   Relationships    Social connections:     Talks on phone: Not on file     Gets together: Not on file     Attends Faith service: Not on file     Active member of club or organization: Not on file     Attends meetings of clubs or organizations: Not on file     Relationship status: Not on file    Intimate partner violence:     Fear of current or ex partner: Not on file     Emotionally abused: Not on file     Physically abused: Not on file     Forced sexual activity: Not on file   Other Topics Concern    Not on file   Social History Narrative    Daily coffee consumption       Current Outpatient Medications:     amLODIPine (NORVASC) 2 5 mg tablet, Take 1 tablet (2 5 mg total) by mouth daily, Disp: 90 tablet, Rfl: 3    atenolol (TENORMIN) 25 mg tablet, Take 1 tablet (25 mg total) by mouth daily at bedtime, Disp: 90 tablet, Rfl: 3    atorvastatin (LIPITOR) 10 mg tablet, Take 1 tablet (10 mg total) by mouth daily at bedtime, Disp: 90 tablet, Rfl: 3    Calcium-Vitamin D (CALTRATE 600 PLUS-VIT D PO), Take by mouth daily at bedtime  , Disp: , Rfl:     Fexofenadine HCl (ALLEGRA PO), Take 10 mg by mouth as needed  , Disp: , Rfl:     pantoprazole (PROTONIX) 40 mg tablet, Take 1 tablet (40 mg total) by mouth 2 (two) times a day, Disp: 60 tablet, Rfl: 5    PARoxetine (PAXIL) 20 mg tablet, Take 1 tablet (20 mg total) by mouth daily (Patient taking differently: Take 20 mg by mouth daily at bedtime ), Disp: 90 tablet, Rfl: 3    VENTOLIN  (90 Base) MCG/ACT inhaler, Inhale 2 puffs every 6 (six) hours as needed for wheezing, Disp: 3 Inhaler, Rfl: 3    zolpidem (AMBIEN) 5 mg tablet, Take 1 tablet (5 mg total) by mouth daily at bedtime as needed for sleep, Disp: 30 tablet, Rfl: 1    aspirin 81 MG tablet, Take 81 mg by mouth daily at bedtime Last dose6/26/19, Disp: , Rfl:   Allergies   Allergen Reactions    Shellfish-Derived Products Anaphylaxis     seafood    Sulfa Antibiotics Anaphylaxis       Review of Systems:  Review of Systems   Constitutional: Positive for fatigue (mild)  HENT: Negative  Eyes: Negative  Respiratory: Negative  Cardiovascular: Negative  Gastrointestinal: Negative  Endocrine: Negative  Genitourinary: Positive for pelvic pain (occasional and mild since having UTI)  Negative for vaginal bleeding, vaginal discharge and vaginal pain  Musculoskeletal: Positive for arthralgias (Knees)  Skin: Negative  Allergic/Immunologic: Negative  Neurological: Positive for dizziness and light-headedness  Hematological: Bruises/bleeds easily  Psychiatric/Behavioral: Negative  Vitals:    10/14/19 1331   BP: 140/90   Pulse: 78   Resp: 18   Temp: 97 9 °F (36 6 °C)   SpO2: 95%   Weight: 80 4 kg (177 lb 3 2 oz)   Height: 5' 7" (1 702 m)        Pain assessment:0    Pain Score: 0-No pain    Imaging:No results found

## 2019-10-14 NOTE — PROGRESS NOTES
Follow-up - Radiation Oncology   Lina Roth 1945 69 y o  female [id number]      History of Present Illness   Cancer Staging  Endometrial cancer [facility])  Staging form: Corpus Uteri - Carcinoma, AJCC 8th Edition  - Clinical: No stage assigned - Unsigned  - Pathologic stage from 8/13/2019: FIGO Stage IB, calculated as Stage Unknown (pT1b, pNX, cM0) - Signed by Nell Alcazar MD on 8/13/2019  Residual tumor (R): R0 - None  Stage used in treatment planning: Yes  National guidelines used in treatment planning: Yes  Type of national guideline used in treatment planning: NCCN      Lina Roth returns today for routine scheduled follow-up visit approximately 1 month status post completion of vaginal brachytherapy  Overall she feels well  She was diagnosed with the urinary tract infection shortly after completion of treatment and was successfully treated with antibiotics with resolution of symptoms  She currently denies any vaginal bleeding or discharge, diarrhea, rectal bleeding, hematuria, or dysuria  No swelling of the lower extremities  Presents for one month  follow up post Vaginal Cuff RT, completed on 9/13/19    F/U with GYN on 8/28/19  Post op doing well  Gyn Onc scheduled for 11/13/19  Historical Information      Endometrial cancer ([facility] [address] )    5/9/2019 Biopsy     PATHOLOGY REPORT   Radhika Piedra SURGICAL #: [id number], [id number]   PATIENT ID: [id number]   SPECIMEN SOURCE: Endometrium   CLINICAL DATA: Provided Diagnosis Codes: N95 0   LMP: 01/01/1993   CLINICAL:      A  ENDOMETRIAL BIOPSY   GROSS:      A  RECEIVED IN FORMALIN AND LABELED WITH PATIENT          IDENTIFICATION, CONSISTS OF FRAGMENTS OF MUCOID, RED-TAN,          SOFT TISSUE AND BLOOD MEASURING 1 7 X 1 5 X 0 2 CM IN          AGGREGATE   THE SPECIMEN IS FILTERED AND SUBMITTED IN ONE          CASSETTE     DIAGNOSIS:      A  ATYPICAL COMPLEX HYPERPLASIA      7/10/2019 Initial Diagnosis     Endometrial cancer ([facility] [address] )      7/10/2019 Surgery     Total abdominal hysterectomy bilateral salpingo-oophorectomy performed by Dr Mor Evangelista for stage IB grade 1 endometrial cancer  Depth of invasion 7 of 9 mm  No lymph vascular space invasion  Patient is 69years old  8/13/2019 -  Cancer Staged     Staging form: Corpus Uteri - Carcinoma, AJCC 8th Edition  - Pathologic stage from 8/13/2019: FIGO Stage IB, calculated as Stage Unknown (pT1b, pNX, cM0) - Signed by Caitlin Tinoco MD on 8/13/2019  Residual tumor (R): R0 - None  Stage used in treatment planning: Yes  National guidelines used in treatment planning: Yes  Type of national guideline used in treatment planning: NCCN        8/30/2019 - 9/13/2019 Radiation     : C1_HDR    Plan ID Energy Fractions Dose per Fraction (cGy) Dose Correction (cGy) Total Dose Delivered (cGy) Elapsed Days   Vag Cuff Ir192 5 / 5 600 0 3,000 14      Treatment dates:  C1_HDR: 8/30/2019 - 9/13/2019           Past Medical History:   Diagnosis Date    Abnormal blood chemistry     abnormal biochemistry findings    Abnormal findings on diagnostic imaging of urinary organs     resolved 07/01/2016    Abnormal glucose     resolved 07/01/2016    Abnormal thyroid exam     resolved 07/01/2016    Abnormal thyroid screen (blood)     resolved 07/01/2016    Abnormal TSH     last assessed 03/07/2016    Allergic     Allergic rhinitis     last assessed 06/25/2015    Anemia     Arthritis     Asthma     Closed Colles' fracture     right wrist, last assessed 01/17/2014    Coronary artery disease     "blockages"  in left carotid artery  - not in the heart(per pt)            sees Dr Donna Alicea   [place] Depression     with insomnia    Diverticulosis     occ diverticulitis    Edema     last assessed 03/11/2015    Environmental allergies     GERD (gastroesophageal reflux disease)     Hematuria     last assessed 11/07/2013    Hiatal hernia     History of transfusion     had 2 units-11/16    Hyperlipidemia     Hypertension     Iron deficiency anemia     chronic-receives iron infusion usually every 6 months    Ischemic colitis ([facility] [address] )     last assessed 12/07/2016    Knee mass     last assessed 06/11/2014    Orthostatic hypotension     last assessed 03/26/2014    Osteoarthrosis of knee     last assessed 11/12/2014    Palpitations     PMB (postmenopausal bleeding)     last assessed 11/07/2013    Polyarthralgia     last assessed 06/09/2014    Posthemorrhagic anemia     last assessed 11/07/2013     Past Surgical History:   Procedure Laterality Date    ABDOMINAL ADHESION SURGERY N/A 7/10/2019    Procedure: EXTENSIVE LYSIS ADHESIONS, OVER SEW SEROSAL TEAR, CLOSURE MESENTERIC DEFECT;  Surgeon: Joy Jones MD;  Location: [address] [place] [address];  Service: General    APPENDECTOMY      BREAST SURGERY Bilateral     exc  lashawn masses-benign    CATARACT EXTRACTION      CATARACT EXTRACTION W/ INTRAOCULAR LENS IMPLANT Right 2/6/2017    Procedure: EXTRACTION EXTRACAPSULAR CATARACT PHACO INTRAOCULAR LENS (IOL); Surgeon: Gretchen Mayer MD;  Location: [facility] MAIN OR;  Service:     CATARACT EXTRACTION W/ INTRAOCULAR LENS IMPLANT Left 1/9/2017    Procedure: EXTRACTION EXTRACAPSULAR CATARACT PHACO INTRAOCULAR LENS (IOL); Surgeon: Gretchen Mayer MD;  Location: [facility] MAIN OR;  Service:     CHOLECYSTECTOMY      lap    COLONOSCOPY N/A 12/3/2016    Procedure: COLONOSCOPY;  Surgeon: Meño Fleming MD;  Location: [place] [address] GI LAB; Service:     CYSTOSCOPY  06/2011    bladder biopsy, lashawn  retrogrades    DILATION AND CURETTAGE OF UTERUS      x2    ESOPHAGOGASTRODUODENOSCOPY N/A 12/2/2016    Procedure: ESOPHAGOGASTRODUODENOSCOPY (EGD); Surgeon: Ferny Schultz MD;  Location: [facility] GI LAB; Service:     ESOPHAGOGASTRODUODENOSCOPY N/A 6/5/2017    Procedure: ESOPHAGOGASTRODUODENOSCOPY (EGD); Surgeon: Meño Fleming MD;  Location: [facility] GI LAB;   Service:     FRACTURE SURGERY Right     distal radius repair w/hardware    HAND TENDON SURGERY Right     laceration repair    HERNIA REPAIR      umbilical    HYSTERECTOMY  07/10/2019    IR IMAGE GUIDED BIOPSY/ASPIRATION LIVER  12/12/2018    KNEE SURGERY Right 07/03/2014    mass removed    PELVIC LAPAROSCOPY Bilateral 7/10/2019    Procedure: SALPINGO-OOPHORECTOMY, LAPAROSCOPIC;  Surgeon: Nancy Dang MD;  Location: [address] [place] [address];  Service: Gynecology    MO LAP,VAG HYST,UTERUS 250GMS/<,SALP-OOPH N/A 7/10/2019    Procedure: HYSTERECTOMY LAPAROSCOPIC ASSISTED VAGINAL (LAVH); Surgeon: Nancy Dang MD;  Location: [address] [place] [address];  Service: Gynecology    TOTAL SHOULDER ARTHROPLASTY Right 7/14/2016    Procedure: ARTHROPLASTY SHOULDER with subacromial decompression, distal clavicle excision and possible rotator cuff repair,limited debridement of laboral tear;  Surgeon: Ahsan Zaldivar MD;  Location: [facility] MAIN OR;  Service:     WRIST SURGERY Right 12/2013    repair fracture with hardware       Social History   Social History     Substance and Sexual Activity   Alcohol Use Not Currently    Frequency: Never    Drinks per session: Patient refused    Binge frequency: Never     Social History     Substance and Sexual Activity   Drug Use No     Social History     Tobacco Use   Smoking Status Never Smoker   Smokeless Tobacco Never Used         Meds/Allergies     Current Outpatient Medications:     amLODIPine (NORVASC) 2 5 mg tablet, Take 1 tablet (2 5 mg total) by mouth daily, Disp: 90 tablet, Rfl: 3    atenolol (TENORMIN) 25 mg tablet, Take 1 tablet (25 mg total) by mouth daily at bedtime, Disp: 90 tablet, Rfl: 3    atorvastatin (LIPITOR) 10 mg tablet, Take 1 tablet (10 mg total) by mouth daily at bedtime, Disp: 90 tablet, Rfl: 3    Calcium-Vitamin D (CALTRATE 600 PLUS-VIT D PO), Take by mouth daily at bedtime  , Disp: , Rfl:     Fexofenadine HCl (ALLEGRA PO), Take 10 mg by mouth as needed  , Disp: , Rfl:     pantoprazole (PROTONIX) 40 mg tablet, Take 1 tablet (40 mg total) by mouth 2 (two) times a day, Disp: 60 tablet, Rfl: 5    PARoxetine (PAXIL) 20 mg tablet, Take 1 tablet (20 mg total) by mouth daily (Patient taking differently: Take 20 mg by mouth daily at bedtime ), Disp: 90 tablet, Rfl: 3    VENTOLIN  (90 Base) MCG/ACT inhaler, Inhale 2 puffs every 6 (six) hours as needed for wheezing, Disp: 3 Inhaler, Rfl: 3    zolpidem (AMBIEN) 5 mg tablet, Take 1 tablet (5 mg total) by mouth daily at bedtime as needed for sleep, Disp: 30 tablet, Rfl: 1    aspirin 81 MG tablet, Take 81 mg by mouth daily at bedtime Last dose6/26/19, Disp: , Rfl:   Allergies   Allergen Reactions    Shellfish-Derived Products Anaphylaxis     seafood    Sulfa Antibiotics Anaphylaxis         Review of Systems   Review of Systems   Constitutional: Positive for fatigue (mild)  HENT: Negative  Eyes: Negative  Respiratory: Negative  Cardiovascular: Negative  Gastrointestinal: Negative  Endocrine: Negative  Genitourinary: Positive for pelvic pain (occasional and mild since having UTI)  Negative for vaginal bleeding, vaginal discharge and vaginal pain  Musculoskeletal: Positive for arthralgias (Knees)  Skin: Negative  Allergic/Immunologic: Negative  Neurological: Positive for dizziness and light-headedness  Hematological: Bruises/bleeds easily  Psychiatric/Behavioral: Negative  OBJECTIVE:   /90   Pulse 78   Temp 97 9 °F (36 6 °C)   Resp 18   Ht 5' 7" (1 702 m)   Wt 80 4 kg (177 lb 3 2 oz)   SpO2 95%   BMI 27 75 kg/m²   Pain Assessment:  0  Karnofsky: 90 - Able to carry on normal activity; minor signs or symptoms of disease     Physical Exam The patient presents today in no apparent distress  Sclera anicteric  Normal respiratory effort  Normal speech  Normal affect        RESULTS    Lab Results:   Recent Results (from the past 672 hour(s))   POCT urine dip auto non-scope    Collection Time: 09/23/19  2:30 PM   Result Value Ref Range     COLOR,UA gabbi     CLARITY,UA clear     SPECIFIC GRAVITY,UA 1 020      PH,UA 5 LEUKOCYTE ESTERASE,UA ++     NITRITE,UA neg     GLUCOSE, UA normal     KETONES,UA neg     BILIRUBIN,UA +     BLOOD,UA about 50     POCT URINE PROTEIN trace     SL AMB POCT UROBILINOGEN 4    Urine culture    Collection Time: 09/23/19  2:52 PM   Result Value Ref Range    Urine Culture (A)      50,000-59,000 cfu/ml Alpha Hemolytic Streptococcus NOT Enterococcus    Urine Culture <10,000 cfu/ml         Susceptibility    Alpha Hemolytic Streptococcus NOT Enterococcus - ULISES     ZID Performed Yes     Genital Comprehensive Culture    Collection Time: 09/23/19  2:53 PM   Result Value Ref Range    Genital Culture       Mixed Vaginal tanya-no growth of Neisseria gonorrhoeae, Group B Streptococcus or clinically significant numbers of yeast, gram negative rods or Gardnerella       Imaging Studies:No results found  Assessment/Plan:  No orders of the defined types were placed in this encounter  Alaina Pena has done well in follow-up and has no clinical evidence of recurrent disease at this time  She will be seeing Gynecologic Oncology for an examination within the next few weeks  She will return to our department in 6 months for routine follow-up  Darren Fuentes MD  10/14/2019,2:13 PM    Portions of the record may have been created with voice recognition software   Occasional wrong word or "sound a like" substitutions may have occurred due to the inherent limitations of voice recognition software   Read the chart carefully and recognize, using context, where substitutions have occurred

## 2019-10-21 DIAGNOSIS — G47.00 INSOMNIA, UNSPECIFIED TYPE: ICD-10-CM

## 2019-10-21 RX ORDER — ZOLPIDEM TARTRATE 5 MG/1
5 TABLET ORAL
Qty: 30 TABLET | Refills: 1 | Status: SHIPPED | OUTPATIENT
Start: 2019-10-21 | End: 2019-12-19 | Stop reason: SDUPTHER

## 2019-10-28 ENCOUNTER — TELEPHONE (OUTPATIENT)
Dept: FAMILY MEDICINE CLINIC | Facility: CLINIC | Age: 74
End: 2019-10-28

## 2019-11-06 NOTE — PROGRESS NOTES
Subjective:      Patient ID: Kerri Kaye is a 69 y o  female  Chief Complaint   Patient presents with    Follow-up     [facility]       Here for a follow up appointment  She feels well  Has not had any bloodwork done recently and no follow up on recent anemic blood values  She feel well and feels she has good energy  Denies chest pain, dyspnea, edema, palpitations  No side effects with her medications  The following portions of the patient's history were reviewed and updated as appropriate: allergies, current medications, past family history, past medical history, past social history, past surgical history and problem list     Review of Systems   Constitutional: Negative  Respiratory: Negative  Cardiovascular: Negative  Current Outpatient Medications   Medication Sig Dispense Refill    amLODIPine (NORVASC) 2 5 mg tablet Take 1 tablet (2 5 mg total) by mouth daily 90 tablet 3    aspirin 81 MG tablet Take 81 mg by mouth daily at bedtime Last dose6/26/19      atenolol (TENORMIN) 25 mg tablet Take 1 tablet (25 mg total) by mouth daily at bedtime 90 tablet 3    atorvastatin (LIPITOR) 10 mg tablet Take 1 tablet (10 mg total) by mouth daily at bedtime 90 tablet 3    Calcium-Vitamin D (CALTRATE 600 PLUS-VIT D PO) Take by mouth daily at bedtime   Fexofenadine HCl (ALLEGRA PO) Take 10 mg by mouth as needed   pantoprazole (PROTONIX) 40 mg tablet Take 1 tablet (40 mg total) by mouth 2 (two) times a day 60 tablet 5    PARoxetine (PAXIL) 20 mg tablet Take 1 tablet (20 mg total) by mouth daily (Patient taking differently: Take 20 mg by mouth daily at bedtime ) 90 tablet 3    VENTOLIN  (90 Base) MCG/ACT inhaler Inhale 2 puffs every 6 (six) hours as needed for wheezing 3 Inhaler 3    zolpidem (AMBIEN) 5 mg tablet Take 1 tablet (5 mg total) by mouth daily at bedtime as needed for sleep 30 tablet 1     No current facility-administered medications for this visit  Objective:    /68   Pulse 60   Temp 98 2 °F (36 8 °C)   Resp 16   Ht 5' 7" (1 702 m)   Wt 81 2 kg (179 lb)   BMI 28 04 kg/m²        Physical Exam   Constitutional: She appears well-developed and well-nourished  Eyes: Conjunctivae are normal    Neck: Neck supple  No JVD present  No thyromegaly present  Cardiovascular: Normal rate, regular rhythm, normal heart sounds and intact distal pulses  Exam reveals no gallop and no friction rub  No murmur heard  Pulmonary/Chest: Effort normal and breath sounds normal  She has no wheezes  She has no rales  Abdominal: Soft  Bowel sounds are normal  She exhibits no distension  There is no tenderness  Musculoskeletal: She exhibits no edema  Assessment/Plan:    Anemia  She has good energy and we will recheck CBC  Benign essential hypertension  Well controlled on amlodipine and atenolol  Will continue  Hyperlipidemia  Will continue atorvastatin and check lipid levels  Continue to follow low fat diet  Endometrial cancer [facility])  She is s/p DANNY and XRT and will continue to follow with her oncologist      Depression  She feels her symptoms are well controlled on paroxetine  Will continue  Diagnoses and all orders for this visit:    Benign essential hypertension  -     CBC and differential; Future  -     Comprehensive metabolic panel; Future  -     Lipid panel; Future    Mixed hyperlipidemia  -     CBC and differential; Future  -     Comprehensive metabolic panel; Future  -     Lipid panel; Future    Depression, unspecified depression type    Anemia, unspecified type  -     CBC and differential; Future    Endometrial cancer ([facility] [address] )          Return in about 4 months (around 3/11/2020) for 1/2 hour follow up and ARI Londono MD

## 2019-11-11 ENCOUNTER — OFFICE VISIT (OUTPATIENT)
Dept: FAMILY MEDICINE CLINIC | Facility: CLINIC | Age: 74
End: 2019-11-11
Payer: MEDICARE

## 2019-11-11 VITALS
HEART RATE: 60 BPM | TEMPERATURE: 98.2 F | SYSTOLIC BLOOD PRESSURE: 120 MMHG | RESPIRATION RATE: 16 BRPM | BODY MASS INDEX: 28.09 KG/M2 | WEIGHT: 179 LBS | HEIGHT: 67 IN | DIASTOLIC BLOOD PRESSURE: 68 MMHG

## 2019-11-11 DIAGNOSIS — F32.A DEPRESSION, UNSPECIFIED DEPRESSION TYPE: ICD-10-CM

## 2019-11-11 DIAGNOSIS — I10 BENIGN ESSENTIAL HYPERTENSION: Primary | ICD-10-CM

## 2019-11-11 DIAGNOSIS — C54.1 ENDOMETRIAL CANCER (HCC): ICD-10-CM

## 2019-11-11 DIAGNOSIS — D64.9 ANEMIA, UNSPECIFIED TYPE: ICD-10-CM

## 2019-11-11 DIAGNOSIS — E78.2 MIXED HYPERLIPIDEMIA: ICD-10-CM

## 2019-11-11 PROCEDURE — 99214 OFFICE O/P EST MOD 30 MIN: CPT | Performed by: INTERNAL MEDICINE

## 2019-11-13 ENCOUNTER — OFFICE VISIT (OUTPATIENT)
Dept: GYNECOLOGIC ONCOLOGY | Facility: CLINIC | Age: 74
End: 2019-11-13
Payer: MEDICARE

## 2019-11-13 VITALS
TEMPERATURE: 98 F | SYSTOLIC BLOOD PRESSURE: 130 MMHG | BODY MASS INDEX: 28.25 KG/M2 | HEART RATE: 70 BPM | DIASTOLIC BLOOD PRESSURE: 88 MMHG | HEIGHT: 67 IN | WEIGHT: 180 LBS

## 2019-11-13 DIAGNOSIS — Z85.42 HISTORY OF CANCER OF UTERUS: Primary | ICD-10-CM

## 2019-11-13 PROCEDURE — 99213 OFFICE O/P EST LOW 20 MIN: CPT | Performed by: OBSTETRICS & GYNECOLOGY

## 2019-11-13 NOTE — LETTER
November 13, 2019     Namita Wing MD  [address]  [place] [address]    Patient: Sonam Espinal   YOB: 1945   Date of Visit: 11/13/2019       Dear Dr Keon Hawkins:    Thank you for referring Sonam Espinal to me for evaluation  Below are my notes for this consultation  If you have questions, please do not hesitate to call me  I look forward to following your patient along with you  Sincerely,        Damon Calvert MD        CC: MD Damon Lehman MD  11/13/2019 11:35 AM  Sign at close encounter  Assessment/Plan:    Problem List Items Addressed This Visit        Other    History of cancer of uterus - Primary     Patient is stable  She is in a clinical remission  She has completed treatment for stage IB endometrial cancer with surgery plus adjuvant radiation therapy  She has tolerated her treatment well  The patient has a low but measurable recurrence rate  We have recommended follow-up with q 3 months visits for 2 years followed by q 6 months visits for 3 year  We will see the patient back in 3 months for repeat evaluation or earlier if symptoms warrant  She has no other survivor ship needs  CHIEF COMPLAINT:  Surveillance endometrial cancer      Problem:  Cancer Staging  Endometrial cancer ([facility] [address] )  Staging form: Corpus Uteri - Carcinoma, AJCC 8th Edition  - Clinical: No stage assigned - Unsigned  - Pathologic stage from 8/13/2019: FIGO Stage IB, calculated as Stage Unknown (pT1b, pNX, cM0) - Signed by Damon Calvert MD on 8/13/2019        Previous therapy:     History of cancer of uterus    5/9/2019 Biopsy     PATHOLOGY REPORT   Chele Swanson    SURGICAL #: [id number], [id number]   PATIENT ID: [id number]   SPECIMEN SOURCE: Endometrium   CLINICAL DATA: Provided Diagnosis Codes: N95 0   LMP: 01/01/1993   CLINICAL:      A  ENDOMETRIAL BIOPSY   GROSS:      A  RECEIVED IN FORMALIN AND LABELED WITH PATIENT          IDENTIFICATION, CONSISTS OF FRAGMENTS OF MUCOID, RED-TAN,          SOFT TISSUE AND BLOOD MEASURING 1 7 X 1 5 X 0 2 CM IN          AGGREGATE   THE SPECIMEN IS FILTERED AND SUBMITTED IN ONE          CASSETTE  DIAGNOSIS:      A  ATYPICAL COMPLEX HYPERPLASIA      7/10/2019 Initial Diagnosis     Endometrial cancer ([facility] [address] )      7/10/2019 Surgery     Total abdominal hysterectomy bilateral salpingo-oophorectomy performed by Dr Rosita Jones for stage IB grade 1 endometrial cancer  Depth of invasion 7 of 9 mm  No lymph vascular space invasion  Patient is 69years old  8/13/2019 -  Cancer Staged     Staging form: Corpus Uteri - Carcinoma, AJCC 8th Edition  - Pathologic stage from 8/13/2019: FIGO Stage IB, calculated as Stage Unknown (pT1b, pNX, cM0) - Signed by Sally Moran MD on 8/13/2019  Residual tumor (R): R0 - None  Stage used in treatment planning: Yes  National guidelines used in treatment planning: Yes  Type of national guideline used in treatment planning: NCCN        8/30/2019 - 9/13/2019 Radiation     : C1_HDR    Plan ID Energy Fractions Dose per Fraction (cGy) Dose Correction (cGy) Total Dose Delivered (cGy) Elapsed Days   Vag Cuff Ir192 5 / 5 600 0 3,000 14      Treatment dates:  C1_HDR: 8/30/2019 - 9/13/2019             Patient ID: Melisa Downey is a 69 y o  female  Patient is very pleasant 61-year-old white female with history of stage IB grade 1 endometrial cancer  She underwent simple hysterectomy by the benign GYN Oncology service  She had no evidence of distant disease on CT scan and was subsequently treated with adjuvant vaginal brachytherapy  She tolerated this treatment well and presents today in follow-up  The patient is without complaint  Today, the patient is doing well  She denies significant abdominal pain, pelvic pain, nausea, vomiting, constipation, diarrhea, fevers, chills, or vaginal bleeding        The following portions of the patient's history were reviewed and updated as appropriate: allergies, current medications, past family history, past social history, past surgical history and problem list     Review of Systems   Constitutional: Negative  HENT: Negative  Eyes: Negative  Respiratory: Negative  Cardiovascular: Negative  Gastrointestinal: Negative  Endocrine: Negative  Genitourinary: Negative  Musculoskeletal: Negative  Skin: Negative  Neurological: Negative  Hematological: Negative  Psychiatric/Behavioral: Negative  Current Outpatient Medications   Medication Sig Dispense Refill    amLODIPine (NORVASC) 2 5 mg tablet Take 1 tablet (2 5 mg total) by mouth daily 90 tablet 3    aspirin 81 MG tablet Take 81 mg by mouth daily at bedtime Last dose6/26/19      atenolol (TENORMIN) 25 mg tablet Take 1 tablet (25 mg total) by mouth daily at bedtime 90 tablet 3    atorvastatin (LIPITOR) 10 mg tablet Take 1 tablet (10 mg total) by mouth daily at bedtime 90 tablet 3    Calcium-Vitamin D (CALTRATE 600 PLUS-VIT D PO) Take by mouth daily at bedtime   Fexofenadine HCl (ALLEGRA PO) Take 10 mg by mouth as needed   pantoprazole (PROTONIX) 40 mg tablet Take 1 tablet (40 mg total) by mouth 2 (two) times a day (Patient taking differently: Take 20 mg by mouth 2 (two) times a day ) 60 tablet 5    PARoxetine (PAXIL) 20 mg tablet Take 1 tablet (20 mg total) by mouth daily (Patient taking differently: Take 20 mg by mouth daily at bedtime ) 90 tablet 3    VENTOLIN  (90 Base) MCG/ACT inhaler Inhale 2 puffs every 6 (six) hours as needed for wheezing 3 Inhaler 3    zolpidem (AMBIEN) 5 mg tablet Take 1 tablet (5 mg total) by mouth daily at bedtime as needed for sleep 30 tablet 1     No current facility-administered medications for this visit  Objective:    Blood pressure 130/88, pulse 70, temperature 98 °F (36 7 °C), temperature source Oral, height 5' 7" (1 702 m), weight 81 6 kg (180 lb), not currently breastfeeding  Body mass index is 28 19 kg/m²    Body surface area is 1 93 meters squared  Physical Exam   Constitutional: She is oriented to person, place, and time  She appears well-developed and well-nourished  HENT:   Head: Normocephalic and atraumatic  Eyes: EOM are normal    Neck: Normal range of motion  Neck supple  No thyromegaly present  Cardiovascular: Normal rate, regular rhythm and normal heart sounds  Pulmonary/Chest: Effort normal and breath sounds normal    Abdominal: Soft  Bowel sounds are normal    Well healed Pfannenstiel incisions  Genitourinary:   Genitourinary Comments: -Normal external female genitalia, normal Bartholin's and Brier's glands                  -Normal midline urethral meatus  No lesions notes                  -Bladder without fullness mass or tenderness                  -Vagina without lesion or discharge No significant cystocele or rectocele noted                  -Cervix surgically absent                  -Uterus surgically absent                  -Adnexae surgically absent                  - Anus without fissure of lesion     Musculoskeletal: Normal range of motion  Lymphadenopathy:     She has no cervical adenopathy  Neurological: She is alert and oriented to person, place, and time  Skin: Skin is warm and dry  Psychiatric: She has a normal mood and affect   Her behavior is normal        No results found for:   Lab Results   Component Value Date     10/18/2016    K 3 9 07/24/2019     07/24/2019    CO2 25 07/24/2019    BUN 3 (L) 07/24/2019    CREATININE 0 59 (L) 07/24/2019    GLUCOSE 94 10/18/2016    GLUF 113 (H) 07/21/2019    CALCIUM 8 6 07/24/2019    AST 13 07/22/2019    ALT 18 07/22/2019    ALKPHOS 135 (H) 07/22/2019    PROT 6 5 10/18/2016    BILITOT 0 3 10/18/2016    EGFR 92 07/24/2019     Lab Results   Component Value Date    WBC 9 45 07/24/2019    HGB 9 4 (L) 07/24/2019    HCT 32 7 (L) 07/24/2019    MCV 81 (L) 07/24/2019     (H) 07/24/2019     Lab Results   Component Value Date    NEUTROABS 6 95 07/24/2019

## 2019-11-13 NOTE — PROGRESS NOTES
Assessment/Plan:    Problem List Items Addressed This Visit        Other    History of cancer of uterus - Primary     Patient is stable  She is in a clinical remission  She has completed treatment for stage IB endometrial cancer with surgery plus adjuvant radiation therapy  She has tolerated her treatment well  The patient has a low but measurable recurrence rate  We have recommended follow-up with q 3 months visits for 2 years followed by q 6 months visits for 3 year  We will see the patient back in 3 months for repeat evaluation or earlier if symptoms warrant  She has no other survivor ship needs  CHIEF COMPLAINT:  Surveillance endometrial cancer      Problem:  Cancer Staging  Endometrial cancer ([facility] )  Staging form: Corpus Uteri - Carcinoma, AJCC 8th Edition  - Clinical: No stage assigned - Unsigned  - Pathologic stage from 8/13/2019: FIGO Stage IB, calculated as Stage Unknown (pT1b, pNX, cM0) - Signed by Maira Schmidt MD on 8/13/2019        Previous therapy:     History of cancer of uterus    5/9/2019 Biopsy     PATHOLOGY REPORT   Agustin Jordan SURGICAL #: [id number], [id number]   PATIENT ID: [id number]   SPECIMEN SOURCE: Endometrium   CLINICAL DATA: Provided Diagnosis Codes: N95 0   LMP: 01/01/1993   CLINICAL:      A  ENDOMETRIAL BIOPSY   GROSS:      A  RECEIVED IN FORMALIN AND LABELED WITH PATIENT          IDENTIFICATION, CONSISTS OF FRAGMENTS OF MUCOID, RED-TAN,          SOFT TISSUE AND BLOOD MEASURING 1 7 X 1 5 X 0 2 CM IN          AGGREGATE   THE SPECIMEN IS FILTERED AND SUBMITTED IN ONE          CASSETTE  DIAGNOSIS:      A  ATYPICAL COMPLEX HYPERPLASIA      7/10/2019 Initial Diagnosis     Endometrial cancer ([facility] )      7/10/2019 Surgery     Total abdominal hysterectomy bilateral salpingo-oophorectomy performed by Dr Chelsea Vizcaino for stage IB grade 1 endometrial cancer  Depth of invasion 7 of 9 mm  No lymph vascular space invasion  Patient is 69years old        8/13/2019 -  Cancer Staged Staging form: Corpus Uteri - Carcinoma, AJCC 8th Edition  - Pathologic stage from 8/13/2019: FIGO Stage IB, calculated as Stage Unknown (pT1b, pNX, cM0) - Signed by Nathaniel Bean MD on 8/13/2019  Residual tumor (R): R0 - None  Stage used in treatment planning: Yes  National guidelines used in treatment planning: Yes  Type of national guideline used in treatment planning: NCCN        8/30/2019 - 9/13/2019 Radiation     : C1_HDR    Plan ID Energy Fractions Dose per Fraction (cGy) Dose Correction (cGy) Total Dose Delivered (cGy) Elapsed Days   Vag Cuff Ir192 5 / 5 600 0 3,000 14      Treatment dates:  C1_HDR: 8/30/2019 - 9/13/2019             Patient ID: Gino Mayer is a 69 y o  female  Patient is very pleasant 62-year-old white female with history of stage IB grade 1 endometrial cancer  She underwent simple hysterectomy by the benign GYN Oncology service  She had no evidence of distant disease on CT scan and was subsequently treated with adjuvant vaginal brachytherapy  She tolerated this treatment well and presents today in follow-up  The patient is without complaint  Today, the patient is doing well  She denies significant abdominal pain, pelvic pain, nausea, vomiting, constipation, diarrhea, fevers, chills, or vaginal bleeding  The following portions of the patient's history were reviewed and updated as appropriate: allergies, current medications, past family history, past social history, past surgical history and problem list     Review of Systems   Constitutional: Negative  HENT: Negative  Eyes: Negative  Respiratory: Negative  Cardiovascular: Negative  Gastrointestinal: Negative  Endocrine: Negative  Genitourinary: Negative  Musculoskeletal: Negative  Skin: Negative  Neurological: Negative  Hematological: Negative  Psychiatric/Behavioral: Negative          Current Outpatient Medications   Medication Sig Dispense Refill    amLODIPine (NORVASC) 2 5 mg tablet Take 1 tablet (2 5 mg total) by mouth daily 90 tablet 3    aspirin 81 MG tablet Take 81 mg by mouth daily at bedtime Last dose6/26/19      atenolol (TENORMIN) 25 mg tablet Take 1 tablet (25 mg total) by mouth daily at bedtime 90 tablet 3    atorvastatin (LIPITOR) 10 mg tablet Take 1 tablet (10 mg total) by mouth daily at bedtime 90 tablet 3    Calcium-Vitamin D (CALTRATE 600 PLUS-VIT D PO) Take by mouth daily at bedtime   Fexofenadine HCl (ALLEGRA PO) Take 10 mg by mouth as needed   pantoprazole (PROTONIX) 40 mg tablet Take 1 tablet (40 mg total) by mouth 2 (two) times a day (Patient taking differently: Take 20 mg by mouth 2 (two) times a day ) 60 tablet 5    PARoxetine (PAXIL) 20 mg tablet Take 1 tablet (20 mg total) by mouth daily (Patient taking differently: Take 20 mg by mouth daily at bedtime ) 90 tablet 3    VENTOLIN  (90 Base) MCG/ACT inhaler Inhale 2 puffs every 6 (six) hours as needed for wheezing 3 Inhaler 3    zolpidem (AMBIEN) 5 mg tablet Take 1 tablet (5 mg total) by mouth daily at bedtime as needed for sleep 30 tablet 1     No current facility-administered medications for this visit  Objective:    Blood pressure 130/88, pulse 70, temperature 98 °F (36 7 °C), temperature source Oral, height 5' 7" (1 702 m), weight 81 6 kg (180 lb), not currently breastfeeding  Body mass index is 28 19 kg/m²  Body surface area is 1 93 meters squared  Physical Exam   Constitutional: She is oriented to person, place, and time  She appears well-developed and well-nourished  HENT:   Head: Normocephalic and atraumatic  Eyes: EOM are normal    Neck: Normal range of motion  Neck supple  No thyromegaly present  Cardiovascular: Normal rate, regular rhythm and normal heart sounds  Pulmonary/Chest: Effort normal and breath sounds normal    Abdominal: Soft  Bowel sounds are normal    Well healed Pfannenstiel incisions     Genitourinary:   Genitourinary Comments: -Normal external female genitalia, normal Bartholin's and [place]'s glands                  -Normal midline urethral meatus  No lesions notes                  -Bladder without fullness mass or tenderness                  -Vagina without lesion or discharge No significant cystocele or rectocele noted                  -Cervix surgically absent                  -Uterus surgically absent                  -Adnexae surgically absent                  - Anus without fissure of lesion     Musculoskeletal: Normal range of motion  Lymphadenopathy:     She has no cervical adenopathy  Neurological: She is alert and oriented to person, place, and time  Skin: Skin is warm and dry  Psychiatric: She has a normal mood and affect   Her behavior is normal        No results found for:   Lab Results   Component Value Date     10/18/2016    K 3 9 07/24/2019     07/24/2019    CO2 25 07/24/2019    BUN 3 (L) 07/24/2019    CREATININE 0 59 (L) 07/24/2019    GLUCOSE 94 10/18/2016    GLUF 113 (H) 07/21/2019    CALCIUM 8 6 07/24/2019    AST 13 07/22/2019    ALT 18 07/22/2019    ALKPHOS 135 (H) 07/22/2019    PROT 6 5 10/18/2016    BILITOT 0 3 10/18/2016    EGFR 92 07/24/2019     Lab Results   Component Value Date    WBC 9 45 07/24/2019    HGB 9 4 (L) 07/24/2019    HCT 32 7 (L) 07/24/2019    MCV 81 (L) 07/24/2019     (H) 07/24/2019     Lab Results   Component Value Date    NEUTROABS 6 95 07/24/2019

## 2019-11-13 NOTE — ASSESSMENT & PLAN NOTE
Patient is stable  She is in a clinical remission  She has completed treatment for stage IB endometrial cancer with surgery plus adjuvant radiation therapy  She has tolerated her treatment well  The patient has a low but measurable recurrence rate  We have recommended follow-up with q 3 months visits for 2 years followed by q 6 months visits for 3 year  We will see the patient back in 3 months for repeat evaluation or earlier if symptoms warrant  She has no other survivor ship needs

## 2019-11-26 ENCOUNTER — TRANSCRIBE ORDERS (OUTPATIENT)
Dept: ADMINISTRATIVE | Facility: HOSPITAL | Age: 74
End: 2019-11-26

## 2019-11-26 ENCOUNTER — APPOINTMENT (OUTPATIENT)
Dept: LAB | Facility: HOSPITAL | Age: 74
End: 2019-11-26
Attending: INTERNAL MEDICINE
Payer: MEDICARE

## 2019-11-26 DIAGNOSIS — D64.9 ANEMIA, UNSPECIFIED TYPE: ICD-10-CM

## 2019-11-26 DIAGNOSIS — I10 BENIGN ESSENTIAL HYPERTENSION: ICD-10-CM

## 2019-11-26 DIAGNOSIS — E78.2 MIXED HYPERLIPIDEMIA: ICD-10-CM

## 2019-11-26 LAB
ALBUMIN SERPL BCP-MCNC: 3.3 G/DL (ref 3.5–5)
ALP SERPL-CCNC: 228 U/L (ref 46–116)
ALT SERPL W P-5'-P-CCNC: 19 U/L (ref 12–78)
ANION GAP SERPL CALCULATED.3IONS-SCNC: 8 MMOL/L (ref 4–13)
AST SERPL W P-5'-P-CCNC: 10 U/L (ref 5–45)
BASOPHILS # BLD AUTO: 0.07 THOUSANDS/ΜL (ref 0–0.1)
BASOPHILS NFR BLD AUTO: 1 % (ref 0–1)
BILIRUB SERPL-MCNC: 0.56 MG/DL (ref 0.2–1)
BUN SERPL-MCNC: 13 MG/DL (ref 5–25)
CALCIUM SERPL-MCNC: 8.8 MG/DL (ref 8.3–10.1)
CHLORIDE SERPL-SCNC: 107 MMOL/L (ref 100–108)
CHOLEST SERPL-MCNC: 165 MG/DL (ref 50–200)
CO2 SERPL-SCNC: 27 MMOL/L (ref 21–32)
CREAT SERPL-MCNC: 0.72 MG/DL (ref 0.6–1.3)
EOSINOPHIL # BLD AUTO: 0.22 THOUSAND/ΜL (ref 0–0.61)
EOSINOPHIL NFR BLD AUTO: 4 % (ref 0–6)
ERYTHROCYTE [DISTWIDTH] IN BLOOD BY AUTOMATED COUNT: 17.7 % (ref 11.6–15.1)
GFR SERPL CREATININE-BSD FRML MDRD: 84 ML/MIN/1.73SQ M
GLUCOSE P FAST SERPL-MCNC: 101 MG/DL (ref 65–99)
HCT VFR BLD AUTO: 34.6 % (ref 34.8–46.1)
HDLC SERPL-MCNC: 45 MG/DL
HGB BLD-MCNC: 10 G/DL (ref 11.5–15.4)
IMM GRANULOCYTES # BLD AUTO: 0.02 THOUSAND/UL (ref 0–0.2)
IMM GRANULOCYTES NFR BLD AUTO: 0 % (ref 0–2)
LDLC SERPL CALC-MCNC: 87 MG/DL (ref 0–100)
LYMPHOCYTES # BLD AUTO: 1.36 THOUSANDS/ΜL (ref 0.6–4.47)
LYMPHOCYTES NFR BLD AUTO: 22 % (ref 14–44)
MCH RBC QN AUTO: 21.8 PG (ref 26.8–34.3)
MCHC RBC AUTO-ENTMCNC: 28.9 G/DL (ref 31.4–37.4)
MCV RBC AUTO: 76 FL (ref 82–98)
MONOCYTES # BLD AUTO: 0.39 THOUSAND/ΜL (ref 0.17–1.22)
MONOCYTES NFR BLD AUTO: 6 % (ref 4–12)
NEUTROPHILS # BLD AUTO: 4.28 THOUSANDS/ΜL (ref 1.85–7.62)
NEUTS SEG NFR BLD AUTO: 67 % (ref 43–75)
NONHDLC SERPL-MCNC: 120 MG/DL
NRBC BLD AUTO-RTO: 0 /100 WBCS
PLATELET # BLD AUTO: 436 THOUSANDS/UL (ref 149–390)
PMV BLD AUTO: 9.7 FL (ref 8.9–12.7)
POTASSIUM SERPL-SCNC: 3.6 MMOL/L (ref 3.5–5.3)
PROT SERPL-MCNC: 7.1 G/DL (ref 6.4–8.2)
RBC # BLD AUTO: 4.58 MILLION/UL (ref 3.81–5.12)
SODIUM SERPL-SCNC: 142 MMOL/L (ref 136–145)
TRIGL SERPL-MCNC: 163 MG/DL
WBC # BLD AUTO: 6.34 THOUSAND/UL (ref 4.31–10.16)

## 2019-11-26 PROCEDURE — 85025 COMPLETE CBC W/AUTO DIFF WBC: CPT

## 2019-11-26 PROCEDURE — 80061 LIPID PANEL: CPT

## 2019-11-26 PROCEDURE — 80053 COMPREHEN METABOLIC PANEL: CPT

## 2019-11-26 PROCEDURE — 36415 COLL VENOUS BLD VENIPUNCTURE: CPT

## 2019-12-15 NOTE — PROGRESS NOTES
Progress Note - Cardiology Office  [facility] cardiology associates  Beka Soliz 69 y o  female MRN: [id number]   Encounter: [id number]     6  Benign essential hypertension    2  Asymptomatic stenosis of left carotid artery    3  Mild pulmonary hypertension ([address] )    4  Uncomplicated asthma, unspecified asthma severity, unspecified whether persistent    5  Anemia, unspecified type    6  Mixed hyperlipidemia        Assessment/Plan       1  Essential hypertension  Patient blood pressure is very well controlled with low-dose amlodipine and atenolol  [employer] Heart rate is also acceptable  She is tolerating atenolol which will be continued  3  Palpitation  Not much of a problem since she is on atenolol  Heart rate is also acceptable    3  Carotid stenosis  She has a 20-39% left carotid stenosis  She is already on medical Rx continue statin and aspirin  Continue to monitor the    4  Mild pulmonary hypertension  Patient has mild pulmonary hypertension by echo  Repeat echo shows PA pressure still around 40 and no other significant valvular disease  Biatrial enlargement noted  Stress test finding Discussed with her also  5  Dyslipidemia  Continue statins  She is taking Lipitor 10 mg  Labs reviewed cholesterol profile and other electrolytes are acceptable  6  Exertion shortness of breath  Mostly when she climb 2-3 flights stairs  Possible causes could be mild obesity, mild pulmonary hypertension or deconditioning  Not much changed  Partially related to deconditioning advised to start exercise program     Follow-up in 6 months    Counseling :  Patient's ability to self care: Yes  Medication side effect reviewed with patient in detail and all their questions answered to their satisfaction  HPI :     Beka Soliz is a 69y o  year old female who came for follow up   Patient has past medical history significant for hypertension, hyperlipidemia, palpitations, who was just recently admitted to [facility] with severe anemia and was thought to have ischemic colitis of splenic flexure secondary to her severe anemia  She was given iron and her hemoglobin has now improved to 12  She denies any chest pain or shortness of breath  She had heart shoulder surgery done on the right shoulder without any problems  There is no PND, no orthopnea, no other significant complaint    12/20/2019  Above reviewed  Patient came for follow-up for high blood pressure  Has hyperlipidemia, history of palpitations, severe anemia and ischemic colitis in the past   She denies any new complaints  She was found to be anemic secondary to dysfunctional uterine bleeding and needed hysterectomy  Her other medical problems are pretty well controlled  Today her heart rate is 63 beats per minute and her blood pressure is stable  She has no chest pain no shortness of breath no dizziness no lightheadedness  Her main issue at this time is her shoulder pain on the right shoulder which is worse when she lies in the bed and has to take some pain medications currently she is using Tylenol  She is concerned about GI bleed due to previously taking Aleve and Motrin  No nausea no vomiting no other complaints  She was in the hospital in July 2019 with upper GI bleed  But has been doing well hemoglobin now around 10  She had history of endometrial cancer status post chemotherapy  Review of Systems   Constitutional: Positive for fatigue  Negative for activity change, chills, diaphoresis, fever and unexpected weight change  HENT: Negative for congestion  Eyes: Negative for discharge and redness  Respiratory: Positive for shortness of breath  Negative for cough, chest tightness and wheezing  Chronic exertional not changed particularly when she climb upstairs   Cardiovascular: Negative  Negative for chest pain, palpitations and leg swelling  Gastrointestinal: Negative for abdominal pain, diarrhea and nausea     Endocrine: Negative  Genitourinary: Negative for decreased urine volume and urgency  Recurrent UTI   Musculoskeletal: Positive for arthralgias  Negative for back pain and gait problem  Skin: Negative for rash and wound  Allergic/Immunologic: Negative  Neurological: Negative for dizziness, seizures, syncope, weakness, light-headedness and headaches  Hematological: Negative  Psychiatric/Behavioral: Negative for agitation and confusion  The patient is nervous/anxious  Historical Information   Past Medical History:   Diagnosis Date    Abnormal blood chemistry     abnormal biochemistry findings    Abnormal findings on diagnostic imaging of urinary organs     resolved 07/01/2016    Abnormal glucose     resolved 07/01/2016    Abnormal thyroid exam     resolved 07/01/2016    Abnormal thyroid screen (blood)     resolved 07/01/2016    Abnormal TSH     last assessed 03/07/2016    Allergic     Allergic rhinitis     last assessed 06/25/2015    Anemia     Arthritis     Asthma     Closed Colles' fracture     right wrist, last assessed 01/17/2014    Coronary artery disease     "blockages"  in left carotid artery  - not in the heart(per pt)            sees Dr Shiv Mares Depression     with insomnia    Diverticulosis     occ diverticulitis    Edema     last assessed 03/11/2015    Environmental allergies     GERD (gastroesophageal reflux disease)     Hematuria     last assessed 11/07/2013    Hiatal hernia     History of transfusion     had 2 units-11/16    Hyperlipidemia     Hypertension     Iron deficiency anemia     chronic-receives iron infusion usually every 6 months    Ischemic colitis ([facility] )     last assessed 12/07/2016    Knee mass     last assessed 06/11/2014    Orthostatic hypotension     last assessed 03/26/2014    Osteoarthrosis of knee     last assessed 11/12/2014    Palpitations     PMB (postmenopausal bleeding)     last assessed 11/07/2013    Polyarthralgia     last assessed 06/09/2014    Posthemorrhagic anemia     last assessed 11/07/2013     Past Surgical History:   Procedure Laterality Date    ABDOMINAL ADHESION SURGERY N/A 7/10/2019    Procedure: EXTENSIVE LYSIS ADHESIONS, OVER SEW SEROSAL TEAR, CLOSURE MESENTERIC DEFECT;  Surgeon: Manpreet Vicente MD;  Location: [address] [place] [address];  Service: General    APPENDECTOMY      BREAST SURGERY Bilateral     exc  lashawn masses-benign    CATARACT EXTRACTION      CATARACT EXTRACTION W/ INTRAOCULAR LENS IMPLANT Right 2/6/2017    Procedure: EXTRACTION EXTRACAPSULAR CATARACT PHACO INTRAOCULAR LENS (IOL); Surgeon: Juan Carlos Calvin MD;  Location: [facility] MAIN OR;  Service:     CATARACT EXTRACTION W/ INTRAOCULAR LENS IMPLANT Left 1/9/2017    Procedure: EXTRACTION EXTRACAPSULAR CATARACT PHACO INTRAOCULAR LENS (IOL); Surgeon: Juan Carlos Calvin MD;  Location: [facility] MAIN OR;  Service:     CHOLECYSTECTOMY      lap    COLONOSCOPY N/A 12/3/2016    Procedure: COLONOSCOPY;  Surgeon: Alpa Reyez MD;  Location: [facility] GI LAB; Service:     CYSTOSCOPY  06/2011    bladder biopsy, lashawn  retrogrades    DILATION AND CURETTAGE OF UTERUS      x2    ESOPHAGOGASTRODUODENOSCOPY N/A 12/2/2016    Procedure: ESOPHAGOGASTRODUODENOSCOPY (EGD); Surgeon: Eric Nguyen MD;  Location: [facility] GI LAB; Service:     ESOPHAGOGASTRODUODENOSCOPY N/A 6/5/2017    Procedure: ESOPHAGOGASTRODUODENOSCOPY (EGD); Surgeon: Alpa Reyez MD;  Location: [facility] GI LAB;   Service:     FRACTURE SURGERY Right     distal radius repair w/hardware    HAND TENDON SURGERY Right     laceration repair    HERNIA REPAIR      umbilical    HYSTERECTOMY  07/10/2019    IR IMAGE GUIDED BIOPSY/ASPIRATION LIVER  12/12/2018    KNEE SURGERY Right 07/03/2014    mass removed    PELVIC LAPAROSCOPY Bilateral 7/10/2019    Procedure: SALPINGO-OOPHORECTOMY, LAPAROSCOPIC;  Surgeon: Arline Ortiz MD;  Location: [address] [place] [address];  Service: Gynecology    LA LAP,VAG HYST,UTERUS 250GMS/<,SALP-OOPH N/A 7/10/2019 Procedure: HYSTERECTOMY LAPAROSCOPIC ASSISTED VAGINAL (LAVH); Surgeon: Chad Virgen MD;  Location: [address] [place] [address];  Service: Gynecology    TOTAL SHOULDER ARTHROPLASTY Right 7/14/2016    Procedure: ARTHROPLASTY SHOULDER with subacromial decompression, distal clavicle excision and possible rotator cuff repair,limited debridement of laboral tear;  Surgeon: Jacquie Calrk MD;  Location: [facility] MAIN OR;  Service:     WRIST SURGERY Right 12/2013    repair fracture with hardware     Social History     Substance and Sexual Activity   Alcohol Use Not Currently    Frequency: Never    Drinks per session: Patient refused    Binge frequency: Never     Social History     Substance and Sexual Activity   Drug Use No     Social History     Tobacco Use   Smoking Status Never Smoker   Smokeless Tobacco Never Used     Family History:   Family History   Problem Relation Age of Onset    Cancer Father [de-identified]        colon     Hypertension Father     Cancer Paternal Grandmother         breast    Stroke Mother         TIA       Meds/Allergies     Allergies   Allergen Reactions    Shellfish-Derived Products Anaphylaxis     seafood    Sulfa Antibiotics Anaphylaxis       Current Outpatient Medications:     amLODIPine (NORVASC) 2 5 mg tablet, Take 1 tablet (2 5 mg total) by mouth daily, Disp: 90 tablet, Rfl: 3    atenolol (TENORMIN) 25 mg tablet, Take 1 tablet (25 mg total) by mouth daily at bedtime, Disp: 90 tablet, Rfl: 3    atorvastatin (LIPITOR) 10 mg tablet, Take 1 tablet (10 mg total) by mouth daily at bedtime, Disp: 90 tablet, Rfl: 3    Calcium-Vitamin D (CALTRATE 600 PLUS-VIT D PO), Take by mouth daily at bedtime  , Disp: , Rfl:     Fexofenadine HCl (ALLEGRA PO), Take 10 mg by mouth as needed  , Disp: , Rfl:     pantoprazole (PROTONIX) 40 mg tablet, Take 1 tablet (40 mg total) by mouth 2 (two) times a day (Patient taking differently: Take 20 mg by mouth 2 (two) times a day ), Disp: 60 tablet, Rfl: 5    PARoxetine (PAXIL) 20 mg tablet, Take 1 tablet (20 mg total) by mouth daily (Patient taking differently: Take 20 mg by mouth daily at bedtime ), Disp: 90 tablet, Rfl: 3    VENTOLIN  (90 Base) MCG/ACT inhaler, Inhale 2 puffs every 6 (six) hours as needed for wheezing, Disp: 3 Inhaler, Rfl: 3    zolpidem (AMBIEN) 5 mg tablet, Take 1 tablet (5 mg total) by mouth daily at bedtime as needed for sleep, Disp: 30 tablet, Rfl: 1    aspirin 81 MG tablet, Take 81 mg by mouth daily at bedtime Last dose6/26/19, Disp: , Rfl:     Vitals: Blood pressure 130/80, pulse 63, height 5' 7" (1 702 m), weight 82 1 kg (181 lb), SpO2 98 %, not currently breastfeeding  Body mass index is 28 35 kg/m²  BP Readings from Last 3 Encounters:   12/20/19 130/80   11/13/19 130/88   11/11/19 120/68       Physical Exam:  Physical Exam   Constitutional: She is oriented to person, place, and time  She appears well-developed and well-nourished  No distress  HENT:   Head: Normocephalic and atraumatic  Eyes: Pupils are equal, round, and reactive to light  Neck: Neck supple  No JVD present  No tracheal deviation present  No thyromegaly present  No carotid bruit   Cardiovascular: Normal rate, regular rhythm, S1 normal and S2 normal  Exam reveals no gallop, no S3, no S4, no distant heart sounds and no friction rub  Murmur heard  Systolic (ejection) murmur is present with a grade of 2/6  Pulmonary/Chest: Effort normal and breath sounds normal  No respiratory distress  She has no wheezes  She has no rales  She exhibits no tenderness  Abdominal: Soft  Bowel sounds are normal  She exhibits no distension  There is no tenderness  Musculoskeletal: She exhibits no edema or deformity  Neurological: She is alert and oriented to person, place, and time  Skin: Skin is warm and dry  No rash noted  She is not diaphoretic  No pallor  Psychiatric: She has a normal mood and affect   Her behavior is normal  Judgment normal          Diagnostic Studies Review Cardio:  Stress Test: Nuclear stress test done in 2015 shows focal basal septal myocardial ischemia most likely artifactual due to location  EF was 65%  Repeat stress test done 06/10/2019 was normal patient exercised for 2 minutes 42 seconds  She has no symptoms no EKG changes  Her exercise capacity was decreased    Echocardiogram/WILFRID: Echo done 2015 shows EF 70%, mild aortic valve sclerosis without stenosis, mild MR, mild pulmonary hypertension with PA pressure 35-40 mmHg       echo Doppler done 2019 shows EF 60%, left atrium moderately dilated, right atrium mildly dilated, trace to mild MR and her PA pressure was 40 mm of mercury  Vascular imagin-39% stenosis of right front quarter artery  1-19% stenosis on left internal carotid artery in study in   Holter/Event Recorder: In  patient's Holter monitor shows average heart rate 81  Few PACs were noted  ECG Report: Normal sinus rhythm heart rate 61 bpm  No significant changes  EKG done about 6 months back  Twelve lead EKG 2018 shows normal sinus rhythm  Rare PVCs heart rate 65 beats per minute  No significant ST changes  Twelve lead EKG 2019 shows normal sinus rhythm  Low voltage in precordial lead most likely due to lead location  Cannot rule out old anteroseptal infarct  No change from previous EKG heart rate 84 beats per minute  Twelve lead EKG 2019 shows normal sinus rhythm heart rate 63 beats per minute  No change from old EKG      Lab Review   Lab Results   Component Value Date    WBC 6 34 2019    HGB 10 0 (L) 2019    HCT 34 6 (L) 2019    MCV 76 (L) 2019     (H) 2019     Lab Results   Component Value Date     10/18/2016    K 3 6 2019     2019    CO2 27 2019    BUN 13 2019    CREATININE 0 72 2019    GLUCOSE 94 10/18/2016    GLUF 101 (H) 2019    CALCIUM 8 8 2019    AST 10 2019    ALT 19 11/26/2019    ALKPHOS 228 (H) 11/26/2019    PROT 6 5 10/18/2016    BILITOT 0 3 10/18/2016    EGFR 84 11/26/2019     Lab Results   Component Value Date    GLUCOSE 94 10/18/2016    CALCIUM 8 8 11/26/2019     10/18/2016    K 3 6 11/26/2019    CO2 27 11/26/2019     11/26/2019    BUN 13 11/26/2019    CREATININE 0 72 11/26/2019     Results for Jody Roper (MRN [id number]) as of 6/6/2019 17:22   Ref  Range 3/25/2019 07:32   Cholesterol Latest Ref Range: 50 - 200 mg/dL 208 (H)   Triglycerides Latest Ref Range: <=150 mg/dL 130   HDL Latest Ref Range: 40 - 60 mg/dL 50   Non-HDL Cholesterol Latest Units: mg/dl 158   LDL Direct Latest Ref Range: 0 - 100 mg/dL 132 (H)       Lab Results   Component Value Date    HGBA1C 5 8 06/26/2019       Dr John Florian MD [facility]      "This note has been constructed using a voice recognition system  Therefore there may be syntax, spelling, and/or grammatical errors   Please call if you have any questions  "

## 2019-12-19 DIAGNOSIS — G47.00 INSOMNIA, UNSPECIFIED TYPE: ICD-10-CM

## 2019-12-19 RX ORDER — ZOLPIDEM TARTRATE 5 MG/1
5 TABLET ORAL
Qty: 30 TABLET | Refills: 1 | Status: SHIPPED | OUTPATIENT
Start: 2019-12-19 | End: 2020-02-20 | Stop reason: SDUPTHER

## 2019-12-20 ENCOUNTER — OFFICE VISIT (OUTPATIENT)
Dept: CARDIOLOGY CLINIC | Facility: CLINIC | Age: 74
End: 2019-12-20
Payer: MEDICARE

## 2019-12-20 VITALS
HEIGHT: 67 IN | OXYGEN SATURATION: 98 % | SYSTOLIC BLOOD PRESSURE: 130 MMHG | HEART RATE: 63 BPM | DIASTOLIC BLOOD PRESSURE: 80 MMHG | WEIGHT: 181 LBS | BODY MASS INDEX: 28.41 KG/M2

## 2019-12-20 DIAGNOSIS — I27.20 MILD PULMONARY HYPERTENSION (HCC): ICD-10-CM

## 2019-12-20 DIAGNOSIS — I65.22 ASYMPTOMATIC STENOSIS OF LEFT CAROTID ARTERY: ICD-10-CM

## 2019-12-20 DIAGNOSIS — J45.909 UNCOMPLICATED ASTHMA, UNSPECIFIED ASTHMA SEVERITY, UNSPECIFIED WHETHER PERSISTENT: ICD-10-CM

## 2019-12-20 DIAGNOSIS — E78.2 MIXED HYPERLIPIDEMIA: ICD-10-CM

## 2019-12-20 DIAGNOSIS — D64.9 ANEMIA, UNSPECIFIED TYPE: ICD-10-CM

## 2019-12-20 DIAGNOSIS — I10 BENIGN ESSENTIAL HYPERTENSION: ICD-10-CM

## 2019-12-20 PROCEDURE — 93000 ELECTROCARDIOGRAM COMPLETE: CPT | Performed by: INTERNAL MEDICINE

## 2019-12-20 PROCEDURE — 99214 OFFICE O/P EST MOD 30 MIN: CPT | Performed by: INTERNAL MEDICINE

## 2020-01-09 ENCOUNTER — OFFICE VISIT (OUTPATIENT)
Dept: FAMILY MEDICINE CLINIC | Facility: CLINIC | Age: 75
End: 2020-01-09
Payer: MEDICARE

## 2020-01-09 VITALS
WEIGHT: 182 LBS | RESPIRATION RATE: 16 BRPM | SYSTOLIC BLOOD PRESSURE: 130 MMHG | BODY MASS INDEX: 28.56 KG/M2 | HEART RATE: 86 BPM | HEIGHT: 67 IN | DIASTOLIC BLOOD PRESSURE: 90 MMHG | OXYGEN SATURATION: 97 % | TEMPERATURE: 99.7 F

## 2020-01-09 DIAGNOSIS — N30.00 ACUTE CYSTITIS WITHOUT HEMATURIA: Primary | ICD-10-CM

## 2020-01-09 DIAGNOSIS — M54.50 ACUTE LEFT-SIDED LOW BACK PAIN WITHOUT SCIATICA: ICD-10-CM

## 2020-01-09 LAB
SL AMB  POCT GLUCOSE, UA: ABNORMAL
SL AMB LEUKOCYTE ESTERASE,UA: ABNORMAL
SL AMB POCT BILIRUBIN,UA: ABNORMAL
SL AMB POCT BLOOD,UA: ABNORMAL
SL AMB POCT CLARITY,UA: ABNORMAL
SL AMB POCT COLOR,UA: YELLOW
SL AMB POCT KETONES,UA: ABNORMAL
SL AMB POCT NITRITE,UA: ABNORMAL
SL AMB POCT PH,UA: 5
SL AMB POCT SPECIFIC GRAVITY,UA: 1030
SL AMB POCT URINE PROTEIN: ABNORMAL
SL AMB POCT UROBILINOGEN: 0.2

## 2020-01-09 PROCEDURE — 99213 OFFICE O/P EST LOW 20 MIN: CPT | Performed by: INTERNAL MEDICINE

## 2020-01-09 PROCEDURE — 81003 URINALYSIS AUTO W/O SCOPE: CPT | Performed by: INTERNAL MEDICINE

## 2020-01-09 RX ORDER — CIPROFLOXACIN 250 MG/1
250 TABLET, FILM COATED ORAL EVERY 12 HOURS SCHEDULED
Qty: 6 TABLET | Refills: 0 | Status: SHIPPED | OUTPATIENT
Start: 2020-01-09 | End: 2020-01-12

## 2020-01-09 RX ORDER — CYCLOBENZAPRINE HCL 10 MG
10 TABLET ORAL 3 TIMES DAILY PRN
Qty: 20 TABLET | Refills: 0 | Status: SHIPPED | OUTPATIENT
Start: 2020-01-09 | End: 2020-08-12

## 2020-01-09 NOTE — PROGRESS NOTES
Assessment/Plan:    1  Acute cystitis without hematuria  Comments:  Rx for cipro was given  Advised increased fluids and asked to call for any worsening symptoms, fever, flank pain  Orders:  -     POCT urine dip auto non-scope  -     Urine culture  -     ciprofloxacin (CIPRO) 250 mg tablet; Take 1 tablet (250 mg total) by mouth every 12 (twelve) hours for 3 days    2  Acute left-sided low back pain without sciatica  Comments:  Appears musculoskeletal, advised continued moist heat and muscle relaxers PRN  Follow up if not improving  Orders:  -     cyclobenzaprine (FLEXERIL) 10 mg tablet; Take 1 tablet (10 mg total) by mouth 3 (three) times a day as needed for muscle spasms            There are no Patient Instructions on file for this visit  Return if symptoms worsen or fail to improve  Subjective:      Patient ID: Kingsley Levy is a 69 y o  female  Chief Complaint   Patient presents with    Back Pain     lower back pain-wmcma    Possible UTI       She has been having about 4-5 days or urinary frequency and foul smelling urine  At about the same time, she started with L lower back pain  Has been using a heating pad and tylenol with minimal relief and trying to drink more fluids  There is no fever or hematuria  No history of stones  The following portions of the patient's history were reviewed and updated as appropriate: allergies, current medications, past family history, past medical history, past social history, past surgical history and problem list     Review of Systems   Constitutional: Negative  Respiratory: Negative  Cardiovascular: Negative  Genitourinary: Positive for frequency  Musculoskeletal: Positive for back pain           Current Outpatient Medications   Medication Sig Dispense Refill    amLODIPine (NORVASC) 2 5 mg tablet Take 1 tablet (2 5 mg total) by mouth daily 90 tablet 3    aspirin 81 MG tablet Take 81 mg by mouth daily at bedtime Last dose6/26/19      atenolol (TENORMIN) 25 mg tablet Take 1 tablet (25 mg total) by mouth daily at bedtime 90 tablet 3    atorvastatin (LIPITOR) 10 mg tablet Take 1 tablet (10 mg total) by mouth daily at bedtime 90 tablet 3    Calcium-Vitamin D (CALTRATE 600 PLUS-VIT D PO) Take by mouth daily at bedtime   Fexofenadine HCl (ALLEGRA PO) Take 10 mg by mouth as needed   pantoprazole (PROTONIX) 40 mg tablet Take 1 tablet (40 mg total) by mouth 2 (two) times a day (Patient taking differently: Take 20 mg by mouth 2 (two) times a day ) 60 tablet 5    PARoxetine (PAXIL) 20 mg tablet Take 1 tablet (20 mg total) by mouth daily (Patient taking differently: Take 20 mg by mouth daily at bedtime ) 90 tablet 3    VENTOLIN  (90 Base) MCG/ACT inhaler Inhale 2 puffs every 6 (six) hours as needed for wheezing 3 Inhaler 3    zolpidem (AMBIEN) 5 mg tablet Take 1 tablet (5 mg total) by mouth daily at bedtime as needed for sleep 30 tablet 1    ciprofloxacin (CIPRO) 250 mg tablet Take 1 tablet (250 mg total) by mouth every 12 (twelve) hours for 3 days 6 tablet 0    cyclobenzaprine (FLEXERIL) 10 mg tablet Take 1 tablet (10 mg total) by mouth 3 (three) times a day as needed for muscle spasms 20 tablet 0     No current facility-administered medications for this visit  Objective:    /90   Pulse 86   Temp 99 7 °F (37 6 °C)   Resp 16   Ht 5' 7" (1 702 m)   Wt 82 6 kg (182 lb)   SpO2 97%   BMI 28 51 kg/m²        Physical Exam   Constitutional: She appears well-developed and well-nourished  Eyes: Conjunctivae are normal    Neck: Neck supple  No JVD present  No thyromegaly present  Cardiovascular: Normal rate, regular rhythm, normal heart sounds and intact distal pulses  Exam reveals no gallop and no friction rub  No murmur heard  Pulmonary/Chest: Effort normal and breath sounds normal  She has no wheezes  She has no rales  Abdominal: Soft  Bowel sounds are normal  She exhibits no distension  There is no tenderness   There is no CVA tenderness  Musculoskeletal: She exhibits no edema  Lumbar back: She exhibits tenderness and spasm                Robert Waller MD

## 2020-01-13 ENCOUNTER — TELEPHONE (OUTPATIENT)
Dept: FAMILY MEDICINE CLINIC | Facility: CLINIC | Age: 75
End: 2020-01-13

## 2020-01-13 NOTE — TELEPHONE ENCOUNTER
Yumiko martell stating they never received specimen from Thursday, only slip  Specimen would have to be recollected       Kj Butler from Lab   [phone]    [facility]

## 2020-01-13 NOTE — TELEPHONE ENCOUNTER
Since she was already treated with antibiotics, the culture does not have to be recollected unless is she is not feeling better

## 2020-01-13 NOTE — TELEPHONE ENCOUNTER
The specimen was in the outside specimen box so I had to discard the specimen  Dr Ras Hdz saw her for this    [facility]

## 2020-01-15 NOTE — TELEPHONE ENCOUNTER
As per dr Shweta Sauer pt was already treated with antibiotics, the culture does not have to be recollected unless is she is not feeling better      Eddie Cano MA

## 2020-02-03 DIAGNOSIS — F32.A DEPRESSION, UNSPECIFIED DEPRESSION TYPE: ICD-10-CM

## 2020-02-03 RX ORDER — PAROXETINE HYDROCHLORIDE 20 MG/1
20 TABLET, FILM COATED ORAL DAILY
Qty: 90 TABLET | Refills: 3 | Status: SHIPPED | OUTPATIENT
Start: 2020-02-03 | End: 2020-08-24 | Stop reason: SDUPTHER

## 2020-02-20 DIAGNOSIS — G47.00 INSOMNIA, UNSPECIFIED TYPE: ICD-10-CM

## 2020-02-20 RX ORDER — ZOLPIDEM TARTRATE 5 MG/1
5 TABLET ORAL
Qty: 30 TABLET | Refills: 1 | Status: SHIPPED | OUTPATIENT
Start: 2020-02-20 | End: 2020-04-20 | Stop reason: SDUPTHER

## 2020-03-10 NOTE — PROGRESS NOTES
Assessment/Plan:    1  Medicare annual wellness visit, subsequent    2  Benign essential hypertension  Assessment & Plan:  Well controlled on current medication and will continue  Advised on exercise  Orders:  -     CBC and differential; Future; Expected date: 07/11/2020  -     Comprehensive metabolic panel; Future; Expected date: 07/11/2020  -     Lipid panel; Future; Expected date: 07/11/2020    3  Depression, unspecified depression type  Assessment & Plan:  She feels her symptoms are well controlled on paroxetine and will continue  4  Mixed hyperlipidemia  Assessment & Plan:  Reviewed labs with patient  Lipids are well controlled and will continue atorvastatin  Advised on need for weight loss and exercise  Orders:  -     CBC and differential; Future; Expected date: 07/11/2020  -     Comprehensive metabolic panel; Future; Expected date: 07/11/2020  -     Lipid panel; Future; Expected date: 07/11/2020    5  Endometrial cancer ([facility] [address] )    6  History of cancer of uterus  Assessment & Plan:  Follows regularly with gyn onc  7  Anemia, unspecified type  Assessment & Plan:  Reviewed labs and this is stable  8  Uncomplicated asthma, unspecified asthma severity, unspecified whether persistent  Assessment & Plan:  Currently well controlled; not having to use her rescue inhaler  Patient Instructions       Medicare Preventive Visit Patient Instructions  Thank you for completing your Welcome to Medicare Visit or Medicare Annual Wellness Visit today  Your next wellness visit will be due in one year (3/11/2021)  The screening/preventive services that you may require over the next 5-10 years are detailed below  Some tests may not apply to you based off risk factors and/or age  Screening tests ordered at today's visit but not completed yet may show as past due  Also, please note that scanned in results may not display below    Preventive Screenings:  Service Recommendations Previous Testing/Comments   Colorectal Cancer Screening  * Colonoscopy    * Fecal Occult Blood Test (FOBT)/Fecal Immunochemical Test (FIT)  * Fecal DNA/Cologuard Test  * Flexible Sigmoidoscopy Age: 54-65 years old   Colonoscopy: every 10 years (may be performed more frequently if at higher risk)  OR  FOBT/FIT: every 1 year  OR  Cologuard: every 3 years  OR  Sigmoidoscopy: every 5 years  Screening may be recommended earlier than age 48 if at higher risk for colorectal cancer  Also, an individualized decision between you and your healthcare provider will decide whether screening between the ages of 74-80 would be appropriate  Colonoscopy: 12/03/2016  FOBT/FIT: Not on file  Cologuard: Not on file  Sigmoidoscopy: Not on file    Screening Current     Breast Cancer Screening Age: 36 years old  Frequency: every 1-2 years  Not required if history of left and right mastectomy Mammogram: 06/12/2018    Screening Current   Cervical Cancer Screening Between the ages of 21-29, pap smear recommended once every 3 years  Between the ages of 33-67, can perform pap smear with HPV co-testing every 5 years  Recommendations may differ for women with a history of total hysterectomy, cervical cancer, or abnormal pap smears in past  Pap Smear: Not on file    Screening Not Indicated   Hepatitis C Screening Once for adults born between 1945 and 1965  More frequently in patients at high risk for Hepatitis C Hep C Antibody: 11/24/2018    Screening Current   Diabetes Screening 1-2 times per year if you're at risk for diabetes or have pre-diabetes Fasting glucose: 101 mg/dL   A1C: 5 8 %    Screening Current   Cholesterol Screening Once every 5 years if you don't have a lipid disorder  May order more often based on risk factors  Lipid panel: 11/26/2019    Screening Not Indicated  History Lipid Disorder     Other Preventive Screenings Covered by Medicare:  1  Abdominal Aortic Aneurysm (AAA) Screening: covered once if your at risk   You're considered to be at risk if you have a family history of AAA  2  Lung Cancer Screening: covers low dose CT scan once per year if you meet all of the following conditions: (1) Age 50-69; (2) No signs or symptoms of lung cancer; (3) Current smoker or have quit smoking within the last 15 years; (4) You have a tobacco smoking history of at least 30 pack years (packs per day multiplied by number of years you smoked); (5) You get a written order from a healthcare provider  3  Glaucoma Screening: covered annually if you're considered high risk: (1) You have diabetes OR (2) Family history of glaucoma OR (3)  aged 48 and older OR (3)  American aged 72 and older  3  Osteoporosis Screening: covered every 2 years if you meet one of the following conditions: (1) You're estrogen deficient and at risk for osteoporosis based off medical history and other findings; (2) Have a vertebral abnormality; (3) On glucocorticoid therapy for more than 3 months; (4) Have primary hyperparathyroidism; (5) On osteoporosis medications and need to assess response to drug therapy  · Last bone density test (DXA Scan): Not on file  5  HIV Screening: covered annually if you're between the age of 12-76  Also covered annually if you are younger than 13 and older than 72 with risk factors for HIV infection  For pregnant patients, it is covered up to 3 times per pregnancy  Immunizations:  Immunization Recommendations   Influenza Vaccine Annual influenza vaccination during flu season is recommended for all persons aged >= 6 months who do not have contraindications   Pneumococcal Vaccine (Prevnar and Pneumovax)  * Prevnar = PCV13  * Pneumovax = PPSV23   Adults 25-60 years old: 1-3 doses may be recommended based on certain risk factors  Adults 72 years old: Prevnar (PCV13) vaccine recommended followed by Pneumovax (PPSV23) vaccine  If already received PPSV23 since turning 65, then PCV13 recommended at least one year after PPSV23 dose  Hepatitis B Vaccine 3 dose series if at intermediate or high risk (ex: diabetes, end stage renal disease, liver disease)   Tetanus (Td) Vaccine - COST NOT COVERED BY MEDICARE PART B Following completion of primary series, a booster dose should be given every 10 years to maintain immunity against tetanus  Td may also be given as tetanus wound prophylaxis  Tdap Vaccine - COST NOT COVERED BY MEDICARE PART B Recommended at least once for all adults  For pregnant patients, recommended with each pregnancy  Shingles Vaccine (Shingrix) - COST NOT COVERED BY MEDICARE PART B  2 shot series recommended in those aged 48 and above     Health Maintenance Due:      Topic Date Due    MAMMOGRAM  06/12/2019    CRC Screening: Colonoscopy  12/03/2021    Hepatitis C Screening  Completed     Immunizations Due:  There are no preventive care reminders to display for this patient  Advance Directives   What are advance directives? Advance directives are legal documents that state your wishes and plans for medical care  These plans are made ahead of time in case you lose your ability to make decisions for yourself  Advance directives can apply to any medical decision, such as the treatments you want, and if you want to donate organs  What are the types of advance directives? There are many types of advance directives, and each state has rules about how to use them  You may choose a combination of any of the following:  · Living will: This is a written record of the treatment you want  You can also choose which treatments you do not want, which to limit, and which to stop at a certain time  This includes surgery, medicine, IV fluid, and tube feedings  · Durable power of  for healthcare [place] [facility]): This is a written record that states who you want to make healthcare choices for you when you are unable to make them for yourself  This person, called a proxy, is usually a family member or a friend   You may choose more than 1 proxy   · Do not resuscitate (DNR) order:  A DNR order is used in case your heart stops beating or you stop breathing  It is a request not to have certain forms of treatment, such as CPR  A DNR order may be included in other types of advance directives  · Medical directive: This covers the care that you want if you are in a coma, near death, or unable to make decisions for yourself  You can list the treatments you want for each condition  Treatment may include pain medicine, surgery, blood transfusions, dialysis, IV or tube feedings, and a ventilator (breathing machine)  · Values history: This document has questions about your views, beliefs, and how you feel and think about life  This information can help others choose the care that you would choose  Why are advance directives important? An advance directive helps you control your care  Although spoken wishes may be used, it is better to have your wishes written down  Spoken wishes can be misunderstood, or not followed  Treatments may be given even if you do not want them  An advance directive may make it easier for your family to make difficult choices about your care  Weight Management   Why it is important to manage your weight:  Being overweight increases your risk of health conditions such as heart disease, high blood pressure, type 2 diabetes, and certain types of cancer  It can also increase your risk for osteoarthritis, sleep apnea, and other respiratory problems  Aim for a slow, steady weight loss  Even a small amount of weight loss can lower your risk of health problems  How to lose weight safely:  A safe and healthy way to lose weight is to eat fewer calories and get regular exercise  You can lose up about 1 pound a week by decreasing the number of calories you eat by 500 calories each day  Healthy meal plan for weight management:  A healthy meal plan includes a variety of foods, contains fewer calories, and helps you stay healthy   A healthy meal plan includes the following:  · Eat whole-grain foods more often  A healthy meal plan should contain fiber  Fiber is the part of grains, fruits, and vegetables that is not broken down by your body  Whole-grain foods are healthy and provide extra fiber in your diet  Some examples of whole-grain foods are whole-wheat breads and pastas, oatmeal, brown rice, and bulgur  · Eat a variety of vegetables every day  Include dark, leafy greens such as spinach, kale, betty greens, and mustard greens  Eat yellow and orange vegetables such as carrots, sweet potatoes, and winter squash  · Eat a variety of fruits every day  Choose fresh or canned fruit (canned in its own juice or light syrup) instead of juice  Fruit juice has very little or no fiber  · Eat low-fat dairy foods  Drink fat-free (skim) milk or 1% milk  Eat fat-free yogurt and low-fat cottage cheese  Try low-fat cheeses such as mozzarella and other reduced-fat cheeses  · Choose meat and other protein foods that are low in fat  Choose beans or other legumes such as split peas or lentils  Choose fish, skinless poultry (chicken or turkey), or lean cuts of red meat (beef or pork)  Before you cook meat or poultry, cut off any visible fat  · Use less fat and oil  Try baking foods instead of frying them  Add less fat, such as margarine, sour cream, regular salad dressing and mayonnaise to foods  Eat fewer high-fat foods  Some examples of high-fat foods include french fries, doughnuts, ice cream, and cakes  · Eat fewer sweets  Limit foods and drinks that are high in sugar  This includes candy, cookies, regular soda, and sweetened drinks  Exercise:  Exercise at least 30 minutes per day on most days of the week  Some examples of exercise include walking, biking, dancing, and swimming  You can also fit in more physical activity by taking the stairs instead of the elevator or parking farther away from stores   Ask your healthcare provider about the best exercise plan for you  © Copyright [employer] 2018 Information is for End User's use only and may not be sold, redistributed or otherwise used for commercial purposes  All illustrations and images included in CareNotes® are the copyrighted property of A D A M , Inc  or [employer]        Return in about 4 months (around 7/11/2020)  Subjective:      Patient ID: Annelise Rivas is a 69 y o  female  Chief Complaint   Patient presents with   [facility] [place] [facility]     [facility]       Here for a follow up of hypertension and other issues  She feels well  Has gained some weight over the winter  Has been walking outside  Not having to use her rescue inhaler  Denies chest pain or dyspnea  No side effects with medications  The following portions of the patient's history were reviewed and updated as appropriate: allergies, current medications, past family history, past medical history, past social history, past surgical history and problem list     Review of Systems   Constitutional: Negative  Respiratory: Negative  Cardiovascular: Negative  Current Outpatient Medications   Medication Sig Dispense Refill    amLODIPine (NORVASC) 2 5 mg tablet Take 1 tablet (2 5 mg total) by mouth daily 90 tablet 3    aspirin 81 MG tablet Take 81 mg by mouth daily at bedtime Last dose6/26/19      atenolol (TENORMIN) 25 mg tablet Take 1 tablet (25 mg total) by mouth daily at bedtime 90 tablet 3    atorvastatin (LIPITOR) 10 mg tablet Take 1 tablet (10 mg total) by mouth daily at bedtime 90 tablet 3    Calcium-Vitamin D (CALTRATE 600 PLUS-VIT D PO) Take by mouth daily at bedtime   cyclobenzaprine (FLEXERIL) 10 mg tablet Take 1 tablet (10 mg total) by mouth 3 (three) times a day as needed for muscle spasms 20 tablet 0    Fexofenadine HCl (ALLEGRA PO) Take 10 mg by mouth as needed        pantoprazole (PROTONIX) 40 mg tablet Take 1 tablet (40 mg total) by mouth 2 (two) times a day (Patient taking differently: Take 20 mg by mouth 2 (two) times a day ) 60 tablet 5    PARoxetine (PAXIL) 20 mg tablet Take 1 tablet (20 mg total) by mouth daily 90 tablet 3    VENTOLIN  (90 Base) MCG/ACT inhaler Inhale 2 puffs every 6 (six) hours as needed for wheezing 3 Inhaler 3    zolpidem (Ambien) 5 mg tablet Take 1 tablet (5 mg total) by mouth daily at bedtime as needed for sleep 30 tablet 1     No current facility-administered medications for this visit  Objective:    /80   Pulse 71   Temp 98 5 °F (36 9 °C)   Resp 16   Ht 5' 7" (1 702 m)   Wt 84 2 kg (185 lb 9 6 oz)   SpO2 96%   BMI 29 07 kg/m²        Physical Exam   Constitutional: She appears well-developed and well-nourished  Eyes: Conjunctivae are normal    Neck: Neck supple  No JVD present  No thyromegaly present  Cardiovascular: Normal rate, regular rhythm, normal heart sounds and intact distal pulses  Exam reveals no gallop and no friction rub  No murmur heard  Pulmonary/Chest: Effort normal and breath sounds normal  She has no wheezes  She has no rales  Abdominal: Soft  Bowel sounds are normal  She exhibits no distension  There is no tenderness  Musculoskeletal: She exhibits no edema                Davey Arnold MD

## 2020-03-11 ENCOUNTER — OFFICE VISIT (OUTPATIENT)
Dept: FAMILY MEDICINE CLINIC | Facility: CLINIC | Age: 75
End: 2020-03-11
Payer: MEDICARE

## 2020-03-11 VITALS
RESPIRATION RATE: 16 BRPM | SYSTOLIC BLOOD PRESSURE: 150 MMHG | TEMPERATURE: 98.5 F | DIASTOLIC BLOOD PRESSURE: 80 MMHG | BODY MASS INDEX: 29.13 KG/M2 | OXYGEN SATURATION: 96 % | WEIGHT: 185.6 LBS | HEIGHT: 67 IN | HEART RATE: 71 BPM

## 2020-03-11 DIAGNOSIS — J45.909 UNCOMPLICATED ASTHMA, UNSPECIFIED ASTHMA SEVERITY, UNSPECIFIED WHETHER PERSISTENT: ICD-10-CM

## 2020-03-11 DIAGNOSIS — C54.1 ENDOMETRIAL CANCER (HCC): ICD-10-CM

## 2020-03-11 DIAGNOSIS — Z85.42 HISTORY OF CANCER OF UTERUS: ICD-10-CM

## 2020-03-11 DIAGNOSIS — Z00.00 MEDICARE ANNUAL WELLNESS VISIT, SUBSEQUENT: Primary | ICD-10-CM

## 2020-03-11 DIAGNOSIS — I10 BENIGN ESSENTIAL HYPERTENSION: ICD-10-CM

## 2020-03-11 DIAGNOSIS — F32.A DEPRESSION, UNSPECIFIED DEPRESSION TYPE: ICD-10-CM

## 2020-03-11 DIAGNOSIS — D64.9 ANEMIA, UNSPECIFIED TYPE: ICD-10-CM

## 2020-03-11 DIAGNOSIS — E78.2 MIXED HYPERLIPIDEMIA: ICD-10-CM

## 2020-03-11 PROCEDURE — 1125F AMNT PAIN NOTED PAIN PRSNT: CPT | Performed by: INTERNAL MEDICINE

## 2020-03-11 PROCEDURE — 3079F DIAST BP 80-89 MM HG: CPT | Performed by: INTERNAL MEDICINE

## 2020-03-11 PROCEDURE — 4040F PNEUMOC VAC/ADMIN/RCVD: CPT | Performed by: INTERNAL MEDICINE

## 2020-03-11 PROCEDURE — 3077F SYST BP >= 140 MM HG: CPT | Performed by: INTERNAL MEDICINE

## 2020-03-11 PROCEDURE — 1160F RVW MEDS BY RX/DR IN RCRD: CPT | Performed by: INTERNAL MEDICINE

## 2020-03-11 PROCEDURE — 3008F BODY MASS INDEX DOCD: CPT | Performed by: INTERNAL MEDICINE

## 2020-03-11 PROCEDURE — 1170F FXNL STATUS ASSESSED: CPT | Performed by: INTERNAL MEDICINE

## 2020-03-11 PROCEDURE — G0439 PPPS, SUBSEQ VISIT: HCPCS | Performed by: INTERNAL MEDICINE

## 2020-03-11 PROCEDURE — 99214 OFFICE O/P EST MOD 30 MIN: CPT | Performed by: INTERNAL MEDICINE

## 2020-03-11 PROCEDURE — 1036F TOBACCO NON-USER: CPT | Performed by: INTERNAL MEDICINE

## 2020-03-11 NOTE — ASSESSMENT & PLAN NOTE
Reviewed labs with patient  Lipids are well controlled and will continue atorvastatin  Advised on need for weight loss and exercise

## 2020-03-11 NOTE — PROGRESS NOTES
Assessment and Plan:     Problem List Items Addressed This Visit     None           Preventive health issues were discussed with patient, and age appropriate screening tests were ordered as noted in patient's After Visit Summary  Personalized health advice and appropriate referrals for health education or preventive services given if needed, as noted in patient's After Visit Summary       History of Present Illness:     Patient presents for Medicare Annual Wellness visit    Patient Care Team:  Thomas Levin MD as PCP - General  MD Mai Morrissey MD (Gastroenterology)  John Florian MD (Cardiology)  Haider Gaffney MD (Hematology and Oncology)  Mai Aceves MD as Gilbert Ortiz MD (Radiation Oncology)  Pieter Menjivar MD (Gynecologic Oncology)  Elizabeth Devlin MD (Obstetrics and Gynecology)     Problem List:     Patient Active Problem List   Diagnosis    Anemia    Benign essential hypertension    Asymptomatic stenosis of left carotid artery    Hyperlipidemia    Mild pulmonary hypertension ([address] )    Asthma    Depression    Hiatal hernia    Primary insomnia    Vitamin B 12 deficiency    S/P DANNY-BSO (total abdominal hysterectomy and bilateral salpingo-oophorectomy)    History of cancer of uterus    Large hiatal hernia with possible volvulus    Aftercare following surgery of the genitourinary system, NEC      Past Medical and Surgical History:     Past Medical History:   Diagnosis Date    Abnormal blood chemistry     abnormal biochemistry findings    Abnormal findings on diagnostic imaging of urinary organs     resolved 07/01/2016    Abnormal glucose     resolved 07/01/2016    Abnormal thyroid exam     resolved 07/01/2016    Abnormal thyroid screen (blood)     resolved 07/01/2016    Abnormal TSH     last assessed 03/07/2016    Allergic     Allergic rhinitis     last assessed 06/25/2015    Anemia     Arthritis     Asthma     Closed Colles' fracture     right wrist, last assessed 01/17/2014    Coronary artery disease     "blockages"  in left carotid artery  - not in the heart(per pt)            sees Dr Jacquelyn Baum Depression     with insomnia    Diverticulosis     occ diverticulitis    Edema     last assessed 03/11/2015    Environmental allergies     GERD (gastroesophageal reflux disease)     Hematuria     last assessed 11/07/2013    Hiatal hernia     History of transfusion     had 2 units-11/16    Hyperlipidemia     Hypertension     Iron deficiency anemia     chronic-receives iron infusion usually every 6 months    Ischemic colitis ([facility] [address] )     last assessed 12/07/2016    Knee mass     last assessed 06/11/2014    Orthostatic hypotension     last assessed 03/26/2014    Osteoarthrosis of knee     last assessed 11/12/2014    Palpitations     PMB (postmenopausal bleeding)     last assessed 11/07/2013    Polyarthralgia     last assessed 06/09/2014    Posthemorrhagic anemia     last assessed 11/07/2013     Past Surgical History:   Procedure Laterality Date    ABDOMINAL ADHESION SURGERY N/A 7/10/2019    Procedure: EXTENSIVE LYSIS ADHESIONS, OVER SEW SEROSAL TEAR, CLOSURE MESENTERIC DEFECT;  Surgeon: Justin Escobedo MD;  Location: [address] [place] [address];  Service: General    APPENDECTOMY      BREAST SURGERY Bilateral     exc  lashawn masses-benign    CATARACT EXTRACTION      CATARACT EXTRACTION W/ INTRAOCULAR LENS IMPLANT Right 2/6/2017    Procedure: EXTRACTION EXTRACAPSULAR CATARACT PHACO INTRAOCULAR LENS (IOL); Surgeon: Sonam Muniz MD;  Location: [facility] OR;  Service:     CATARACT EXTRACTION W/ INTRAOCULAR LENS IMPLANT Left 1/9/2017    Procedure: EXTRACTION EXTRACAPSULAR CATARACT PHACO INTRAOCULAR LENS (IOL); Surgeon: Sonam Muniz MD;  Location: [facility] OR;  Service:     CHOLECYSTECTOMY      lap    COLONOSCOPY N/A 12/3/2016    Procedure: COLONOSCOPY;  Surgeon: David Dang MD;  Location: [facility] LAB;   Service:     CYSTOSCOPY  06/2011    bladder biopsy, lashawn  retrogrades    DILATION AND CURETTAGE OF UTERUS      x2    ESOPHAGOGASTRODUODENOSCOPY N/A 12/2/2016    Procedure: ESOPHAGOGASTRODUODENOSCOPY (EGD); Surgeon: Mary Andrews MD;  Location: [facility] GI LAB; Service:     ESOPHAGOGASTRODUODENOSCOPY N/A 6/5/2017    Procedure: ESOPHAGOGASTRODUODENOSCOPY (EGD); Surgeon: Kelsey Everett MD;  Location: [facility] GI LAB; Service:     FRACTURE SURGERY Right     distal radius repair w/hardware    HAND TENDON SURGERY Right     laceration repair    HERNIA REPAIR      umbilical    HYSTERECTOMY  07/10/2019    IR IMAGE GUIDED BIOPSY/ASPIRATION LIVER  12/12/2018    KNEE SURGERY Right 07/03/2014    mass removed    PELVIC LAPAROSCOPY Bilateral 7/10/2019    Procedure: SALPINGO-OOPHORECTOMY, LAPAROSCOPIC;  Surgeon: Hari Rockwell MD;  Location: [address] [place] [address];  Service: Gynecology    MS LAP,VAG HYST,UTERUS 250GMS/<,SALP-OOPH N/A 7/10/2019    Procedure: HYSTERECTOMY LAPAROSCOPIC ASSISTED VAGINAL (LAVH); Surgeon: Hari Rockwell MD;  Location: [address] [place] [address];  Service: Gynecology    TOTAL SHOULDER ARTHROPLASTY Right 7/14/2016    Procedure: ARTHROPLASTY SHOULDER with subacromial decompression, distal clavicle excision and possible rotator cuff repair,limited debridement of laboral tear;  Surgeon: Aniyah Ferguson MD;  Location: [facility] MAIN OR;  Service:     WRIST SURGERY Right 12/2013    repair fracture with hardware      Family History:     Family History   Problem Relation Age of Onset    Cancer Father [de-identified]        colon     Hypertension Father     Cancer Paternal Grandmother         breast    Stroke Mother         TIA      Social History:        Social History     Socioeconomic History    Marital status:       Spouse name: None    Number of children: 3    Years of education: None    Highest education level: None   Occupational History    None   Social Needs    Financial resource strain: None    Food insecurity:     Worry: None     Inability: None    Transportation needs: Medical: None     Non-medical: None   Tobacco Use    Smoking status: Never Smoker    Smokeless tobacco: Never Used   Substance and Sexual Activity    Alcohol use: Not Currently     Frequency: Never     Drinks per session: Patient refused     Binge frequency: Never    Drug use: No    Sexual activity: Not Currently   Lifestyle    Physical activity:     Days per week: None     Minutes per session: None    Stress: None   Relationships    Social connections:     Talks on phone: None     Gets together: None     Attends Restorationism service: None     Active member of club or organization: None     Attends meetings of clubs or organizations: None     Relationship status: None    Intimate partner violence:     Fear of current or ex partner: None     Emotionally abused: None     Physically abused: None     Forced sexual activity: None   Other Topics Concern    None   Social History Narrative    Daily coffee consumption      Medications and Allergies:     Current Outpatient Medications   Medication Sig Dispense Refill    amLODIPine (NORVASC) 2 5 mg tablet Take 1 tablet (2 5 mg total) by mouth daily 90 tablet 3    aspirin 81 MG tablet Take 81 mg by mouth daily at bedtime Last dose6/26/19      atenolol (TENORMIN) 25 mg tablet Take 1 tablet (25 mg total) by mouth daily at bedtime 90 tablet 3    atorvastatin (LIPITOR) 10 mg tablet Take 1 tablet (10 mg total) by mouth daily at bedtime 90 tablet 3    Calcium-Vitamin D (CALTRATE 600 PLUS-VIT D PO) Take by mouth daily at bedtime   cyclobenzaprine (FLEXERIL) 10 mg tablet Take 1 tablet (10 mg total) by mouth 3 (three) times a day as needed for muscle spasms 20 tablet 0    Fexofenadine HCl (ALLEGRA PO) Take 10 mg by mouth as needed        pantoprazole (PROTONIX) 40 mg tablet Take 1 tablet (40 mg total) by mouth 2 (two) times a day (Patient taking differently: Take 20 mg by mouth 2 (two) times a day ) 60 tablet 5    PARoxetine (PAXIL) 20 mg tablet Take 1 tablet (20 mg total) by mouth daily 90 tablet 3    VENTOLIN  (90 Base) MCG/ACT inhaler Inhale 2 puffs every 6 (six) hours as needed for wheezing 3 Inhaler 3    zolpidem (Ambien) 5 mg tablet Take 1 tablet (5 mg total) by mouth daily at bedtime as needed for sleep 30 tablet 1     No current facility-administered medications for this visit  Allergies   Allergen Reactions    Shellfish-Derived Products Anaphylaxis     seafood    Sulfa Antibiotics Anaphylaxis      Immunizations:     Immunization History   Administered Date(s) Administered    INFLUENZA 10/28/2019    Influenza Split High Dose Preservative Free IM 12/05/2016, 10/01/2017    Influenza, Quadrivalent (nasal) 12/05/2016    Pneumococcal Conjugate 13-Valent 12/07/2016    Pneumococcal Polysaccharide PPV23 11/12/2014    Tdap 11/22/2010    Tetanus, adsorbed 08/01/2011    Tuberculin Skin Test-PPD Intradermal 11/07/2011      Health Maintenance:         Topic Date Due    MAMMOGRAM  06/12/2019    CRC Screening: Colonoscopy  12/03/2021    Hepatitis C Screening  Completed     There are no preventive care reminders to display for this patient  Medicare Health Risk Assessment:     /80   Pulse 71   Temp 98 5 °F (36 9 °C)   Resp 16   Ht 5' 7" (1 702 m)   Wt 84 2 kg (185 lb 9 6 oz)   SpO2 96%   BMI 29 07 kg/m²      Ruby Linton is here for her Subsequent Wellness visit  Health Risk Assessment:   Patient rates overall health as very good  Patient feels that their physical health rating is same  Eyesight was rated as same  Hearing was rated as same  Patient feels that their emotional and mental health rating is same  Pain experienced in the last 7 days has been some  Patient's pain rating has been 2/10  Patient states that she has experienced no weight loss or gain in last 6 months  Depression Screening:   PHQ-2 Score: 0  PHQ-9 Score: 0      Fall Risk Screening:    In the past year, patient has experienced: no history of falling in past year      Urinary Incontinence Screening:   Patient has not leaked urine accidently in the last six months  Home Safety:  Patient has trouble with stairs inside or outside of their home  Patient has working smoke alarms and has working carbon monoxide detector  Home safety hazards include: unsecured electrical cords  Nutrition:   Current diet is Regular  Medications:   Patient is currently taking over-the-counter supplements  OTC medications include: see medication list  Patient is able to manage medications  Activities of Daily Living (ADLs)/Instrumental Activities of Daily Living (IADLs):   Walk and transfer into and out of bed and chair?: Yes  Dress and groom yourself?: Yes    Bathe or shower yourself?: Yes    Feed yourself? Yes  Do your laundry/housekeeping?: Yes  Manage your money, pay your bills and track your expenses?: Yes  Make your own meals?: Yes    Do your own shopping?: Yes    Previous Hospitalizations:   Any hospitalizations or ED visits within the last 12 months?: Yes    How many hospitalizations have you had in the last year?: 3-4    Hospitalization Comments: Hysterectomy, uterus cancer    Advance Care Planning:   Living will: No      Comments: Has information      Cognitive Screening:   Provider or family/friend/caregiver concerned regarding cognition?: No    PREVENTIVE SCREENINGS      Cardiovascular Screening:    General: Screening Not Indicated and History Lipid Disorder      Diabetes Screening:     General: Screening Current      Colorectal Cancer Screening:     General: Screening Current      Breast Cancer Screening:     General: Screening Current      Cervical Cancer Screening:    General: Screening Not Indicated      Osteoporosis Screening:    General: Patient Declines      Abdominal Aortic Aneurysm (AAA) Screening:        General: Screening Not Indicated      Lung Cancer Screening:     General: Screening Not Indicated      Hepatitis C Screening:    General: Screening Current    Other Counseling Topics:   Alcohol use counseling, car/seat belt/driving safety, skin self-exam, sunscreen and calcium and vitamin D intake and regular weightbearing exercise         Shikha Rogers MD

## 2020-03-11 NOTE — PATIENT INSTRUCTIONS
Medicare Preventive Visit Patient Instructions  Thank you for completing your Welcome to Medicare Visit or Medicare Annual Wellness Visit today  Your next wellness visit will be due in one year (3/11/2021)  The screening/preventive services that you may require over the next 5-10 years are detailed below  Some tests may not apply to you based off risk factors and/or age  Screening tests ordered at today's visit but not completed yet may show as past due  Also, please note that scanned in results may not display below  Preventive Screenings:  Service Recommendations Previous Testing/Comments   Colorectal Cancer Screening  * Colonoscopy    * Fecal Occult Blood Test (FOBT)/Fecal Immunochemical Test (FIT)  * Fecal DNA/Cologuard Test  * Flexible Sigmoidoscopy Age: 54-65 years old   Colonoscopy: every 10 years (may be performed more frequently if at higher risk)  OR  FOBT/FIT: every 1 year  OR  Cologuard: every 3 years  OR  Sigmoidoscopy: every 5 years  Screening may be recommended earlier than age 48 if at higher risk for colorectal cancer  Also, an individualized decision between you and your healthcare provider will decide whether screening between the ages of 74-80 would be appropriate  Colonoscopy: 12/03/2016  FOBT/FIT: Not on file  Cologuard: Not on file  Sigmoidoscopy: Not on file    Screening Current     Breast Cancer Screening Age: 36 years old  Frequency: every 1-2 years  Not required if history of left and right mastectomy Mammogram: 06/12/2018    Screening Current   Cervical Cancer Screening Between the ages of 21-29, pap smear recommended once every 3 years  Between the ages of 33-67, can perform pap smear with HPV co-testing every 5 years     Recommendations may differ for women with a history of total hysterectomy, cervical cancer, or abnormal pap smears in past  Pap Smear: Not on file    Screening Not Indicated   Hepatitis C Screening Once for adults born between 1945 and 1965  More frequently in patients at high risk for Hepatitis C Hep C Antibody: 11/24/2018    Screening Current   Diabetes Screening 1-2 times per year if you're at risk for diabetes or have pre-diabetes Fasting glucose: 101 mg/dL   A1C: 5 8 %    Screening Current   Cholesterol Screening Once every 5 years if you don't have a lipid disorder  May order more often based on risk factors  Lipid panel: 11/26/2019    Screening Not Indicated  History Lipid Disorder     Other Preventive Screenings Covered by Medicare:  1  Abdominal Aortic Aneurysm (AAA) Screening: covered once if your at risk  You're considered to be at risk if you have a family history of AAA  2  Lung Cancer Screening: covers low dose CT scan once per year if you meet all of the following conditions: (1) Age 50-69; (2) No signs or symptoms of lung cancer; (3) Current smoker or have quit smoking within the last 15 years; (4) You have a tobacco smoking history of at least 30 pack years (packs per day multiplied by number of years you smoked); (5) You get a written order from a healthcare provider  3  Glaucoma Screening: covered annually if you're considered high risk: (1) You have diabetes OR (2) Family history of glaucoma OR (3)  aged 48 and older OR (3)  American aged 72 and older  3  Osteoporosis Screening: covered every 2 years if you meet one of the following conditions: (1) You're estrogen deficient and at risk for osteoporosis based off medical history and other findings; (2) Have a vertebral abnormality; (3) On glucocorticoid therapy for more than 3 months; (4) Have primary hyperparathyroidism; (5) On osteoporosis medications and need to assess response to drug therapy  · Last bone density test (DXA Scan): Not on file  5  HIV Screening: covered annually if you're between the age of 12-76  Also covered annually if you are younger than 13 and older than 72 with risk factors for HIV infection   For pregnant patients, it is covered up to 3 times per pregnancy  Immunizations:  Immunization Recommendations   Influenza Vaccine Annual influenza vaccination during flu season is recommended for all persons aged >= 6 months who do not have contraindications   Pneumococcal Vaccine (Prevnar and Pneumovax)  * Prevnar = PCV13  * Pneumovax = PPSV23   Adults 25-60 years old: 1-3 doses may be recommended based on certain risk factors  Adults 72 years old: Prevnar (PCV13) vaccine recommended followed by Pneumovax (PPSV23) vaccine  If already received PPSV23 since turning 65, then PCV13 recommended at least one year after PPSV23 dose  Hepatitis B Vaccine 3 dose series if at intermediate or high risk (ex: diabetes, end stage renal disease, liver disease)   Tetanus (Td) Vaccine - COST NOT COVERED BY MEDICARE PART B Following completion of primary series, a booster dose should be given every 10 years to maintain immunity against tetanus  Td may also be given as tetanus wound prophylaxis  Tdap Vaccine - COST NOT COVERED BY MEDICARE PART B Recommended at least once for all adults  For pregnant patients, recommended with each pregnancy  Shingles Vaccine (Shingrix) - COST NOT COVERED BY MEDICARE PART B  2 shot series recommended in those aged 48 and above     Health Maintenance Due:      Topic Date Due    MAMMOGRAM  06/12/2019    CRC Screening: Colonoscopy  12/03/2021    Hepatitis C Screening  Completed     Immunizations Due:  There are no preventive care reminders to display for this patient  Advance Directives   What are advance directives? Advance directives are legal documents that state your wishes and plans for medical care  These plans are made ahead of time in case you lose your ability to make decisions for yourself  Advance directives can apply to any medical decision, such as the treatments you want, and if you want to donate organs  What are the types of advance directives?   There are many types of advance directives, and each state has rules about how to use them  You may choose a combination of any of the following:  · Living will: This is a written record of the treatment you want  You can also choose which treatments you do not want, which to limit, and which to stop at a certain time  This includes surgery, medicine, IV fluid, and tube feedings  · Durable power of  for healthcare [facility]): This is a written record that states who you want to make healthcare choices for you when you are unable to make them for yourself  This person, called a proxy, is usually a family member or a friend  You may choose more than 1 proxy  · Do not resuscitate (DNR) order:  A DNR order is used in case your heart stops beating or you stop breathing  It is a request not to have certain forms of treatment, such as CPR  A DNR order may be included in other types of advance directives  · Medical directive: This covers the care that you want if you are in a coma, near death, or unable to make decisions for yourself  You can list the treatments you want for each condition  Treatment may include pain medicine, surgery, blood transfusions, dialysis, IV or tube feedings, and a ventilator (breathing machine)  · Values history: This document has questions about your views, beliefs, and how you feel and think about life  This information can help others choose the care that you would choose  Why are advance directives important? An advance directive helps you control your care  Although spoken wishes may be used, it is better to have your wishes written down  Spoken wishes can be misunderstood, or not followed  Treatments may be given even if you do not want them  An advance directive may make it easier for your family to make difficult choices about your care  Weight Management   Why it is important to manage your weight:  Being overweight increases your risk of health conditions such as heart disease, high blood pressure, type 2 diabetes, and certain types of cancer   It can also increase your risk for osteoarthritis, sleep apnea, and other respiratory problems  Aim for a slow, steady weight loss  Even a small amount of weight loss can lower your risk of health problems  How to lose weight safely:  A safe and healthy way to lose weight is to eat fewer calories and get regular exercise  You can lose up about 1 pound a week by decreasing the number of calories you eat by 500 calories each day  Healthy meal plan for weight management:  A healthy meal plan includes a variety of foods, contains fewer calories, and helps you stay healthy  A healthy meal plan includes the following:  · Eat whole-grain foods more often  A healthy meal plan should contain fiber  Fiber is the part of grains, fruits, and vegetables that is not broken down by your body  Whole-grain foods are healthy and provide extra fiber in your diet  Some examples of whole-grain foods are whole-wheat breads and pastas, oatmeal, brown rice, and bulgur  · Eat a variety of vegetables every day  Include dark, leafy greens such as spinach, kale, betty greens, and mustard greens  Eat yellow and orange vegetables such as carrots, sweet potatoes, and winter squash  · Eat a variety of fruits every day  Choose fresh or canned fruit (canned in its own juice or light syrup) instead of juice  Fruit juice has very little or no fiber  · Eat low-fat dairy foods  Drink fat-free (skim) milk or 1% milk  Eat fat-free yogurt and low-fat cottage cheese  Try low-fat cheeses such as mozzarella and other reduced-fat cheeses  · Choose meat and other protein foods that are low in fat  Choose beans or other legumes such as split peas or lentils  Choose fish, skinless poultry (chicken or turkey), or lean cuts of red meat (beef or pork)  Before you cook meat or poultry, cut off any visible fat  · Use less fat and oil  Try baking foods instead of frying them   Add less fat, such as margarine, sour cream, regular salad dressing and mayonnaise to foods  Eat fewer high-fat foods  Some examples of high-fat foods include french fries, doughnuts, ice cream, and cakes  · Eat fewer sweets  Limit foods and drinks that are high in sugar  This includes candy, cookies, regular soda, and sweetened drinks  Exercise:  Exercise at least 30 minutes per day on most days of the week  Some examples of exercise include walking, biking, dancing, and swimming  You can also fit in more physical activity by taking the stairs instead of the elevator or parking farther away from stores  Ask your healthcare provider about the best exercise plan for you  © Copyright [employer] 2018 Information is for End User's use only and may not be sold, redistributed or otherwise used for commercial purposes   All illustrations and images included in CareNotes® are the copyrighted property of [employer] D [employer] M [employer]  or [address] [place] [address]

## 2020-04-14 DIAGNOSIS — E78.5 HYPERLIPIDEMIA, UNSPECIFIED HYPERLIPIDEMIA TYPE: ICD-10-CM

## 2020-04-14 RX ORDER — ATORVASTATIN CALCIUM 10 MG/1
10 TABLET, FILM COATED ORAL
Qty: 90 TABLET | Refills: 3 | Status: SHIPPED | OUTPATIENT
Start: 2020-04-14

## 2020-04-20 DIAGNOSIS — G47.00 INSOMNIA, UNSPECIFIED TYPE: ICD-10-CM

## 2020-04-20 RX ORDER — ZOLPIDEM TARTRATE 5 MG/1
5 TABLET ORAL
Qty: 90 TABLET | Refills: 0 | Status: SHIPPED | OUTPATIENT
Start: 2020-04-20 | End: 2020-05-21 | Stop reason: SDUPTHER

## 2020-05-21 DIAGNOSIS — G47.00 INSOMNIA, UNSPECIFIED TYPE: ICD-10-CM

## 2020-05-21 RX ORDER — ZOLPIDEM TARTRATE 5 MG/1
5 TABLET ORAL
Qty: 90 TABLET | Refills: 0 | Status: SHIPPED | OUTPATIENT
Start: 2020-05-21 | End: 2020-08-18 | Stop reason: SDUPTHER

## 2020-06-08 ENCOUNTER — OFFICE VISIT (OUTPATIENT)
Dept: FAMILY MEDICINE CLINIC | Facility: CLINIC | Age: 75
End: 2020-06-08
Payer: MEDICARE

## 2020-06-08 ENCOUNTER — TELEPHONE (OUTPATIENT)
Dept: FAMILY MEDICINE CLINIC | Facility: CLINIC | Age: 75
End: 2020-06-08

## 2020-06-08 VITALS
HEIGHT: 67 IN | DIASTOLIC BLOOD PRESSURE: 88 MMHG | BODY MASS INDEX: 29.51 KG/M2 | SYSTOLIC BLOOD PRESSURE: 164 MMHG | WEIGHT: 188 LBS | TEMPERATURE: 99.4 F | RESPIRATION RATE: 20 BRPM | HEART RATE: 76 BPM

## 2020-06-08 DIAGNOSIS — R21 RASH: Primary | ICD-10-CM

## 2020-06-08 PROCEDURE — 3008F BODY MASS INDEX DOCD: CPT | Performed by: FAMILY MEDICINE

## 2020-06-08 PROCEDURE — 3077F SYST BP >= 140 MM HG: CPT | Performed by: FAMILY MEDICINE

## 2020-06-08 PROCEDURE — 1160F RVW MEDS BY RX/DR IN RCRD: CPT | Performed by: FAMILY MEDICINE

## 2020-06-08 PROCEDURE — 3079F DIAST BP 80-89 MM HG: CPT | Performed by: FAMILY MEDICINE

## 2020-06-08 PROCEDURE — 99213 OFFICE O/P EST LOW 20 MIN: CPT | Performed by: FAMILY MEDICINE

## 2020-06-08 PROCEDURE — 1036F TOBACCO NON-USER: CPT | Performed by: FAMILY MEDICINE

## 2020-06-08 PROCEDURE — 4040F PNEUMOC VAC/ADMIN/RCVD: CPT | Performed by: FAMILY MEDICINE

## 2020-06-08 RX ORDER — PREDNISONE 10 MG/1
TABLET ORAL
Qty: 20 TABLET | Refills: 0 | Status: SHIPPED | OUTPATIENT
Start: 2020-06-08 | End: 2020-06-18

## 2020-06-18 ENCOUNTER — TELEPHONE (OUTPATIENT)
Dept: GASTROENTEROLOGY | Facility: AMBULARY SURGERY CENTER | Age: 75
End: 2020-06-18

## 2020-06-18 DIAGNOSIS — K21.9 GASTROESOPHAGEAL REFLUX DISEASE, ESOPHAGITIS PRESENCE NOT SPECIFIED: ICD-10-CM

## 2020-06-18 RX ORDER — PANTOPRAZOLE SODIUM 40 MG/1
40 TABLET, DELAYED RELEASE ORAL 2 TIMES DAILY
Qty: 60 TABLET | Refills: 5 | Status: SHIPPED | OUTPATIENT
Start: 2020-06-18 | End: 2020-09-03 | Stop reason: SDUPTHER

## 2020-06-18 NOTE — TELEPHONE ENCOUNTER
GI Physician: Dr Tania Shah for Medication: pantoprazole     Dose: 40 mg    Quantity: 60 tablet    Pharmacy and Location: [facility]   Scheduled follow up appt 9/3/20

## 2020-06-18 NOTE — TELEPHONE ENCOUNTER
Dr Wyatt Acosta patient Hx-  hiatal hernia with Donn's ulcers,Gerd, elevated alkaline phos    Requested refill pantoprazole 40 mg twice daily    Last office visit 10/31/2018  EGD 7/22/19-hiatal hernia with Donn's ulcers-continue PPI therapy      Follow up visit scheduled September     Script entered if agreeable

## 2020-06-22 ENCOUNTER — TELEMEDICINE (OUTPATIENT)
Dept: RADIATION ONCOLOGY | Facility: HOSPITAL | Age: 75
End: 2020-06-22
Attending: RADIOLOGY
Payer: MEDICARE

## 2020-06-22 DIAGNOSIS — Z85.42 HISTORY OF CANCER OF UTERUS: Primary | ICD-10-CM

## 2020-06-22 DIAGNOSIS — C54.1 ENDOMETRIAL CANCER (HCC): Primary | ICD-10-CM

## 2020-06-22 PROCEDURE — 99211 OFF/OP EST MAY X REQ PHY/QHP: CPT | Performed by: RADIOLOGY

## 2020-06-24 ENCOUNTER — OFFICE VISIT (OUTPATIENT)
Dept: CARDIOLOGY CLINIC | Facility: CLINIC | Age: 75
End: 2020-06-24
Payer: MEDICARE

## 2020-06-24 VITALS
WEIGHT: 185 LBS | DIASTOLIC BLOOD PRESSURE: 76 MMHG | SYSTOLIC BLOOD PRESSURE: 130 MMHG | BODY MASS INDEX: 29.03 KG/M2 | HEART RATE: 86 BPM | OXYGEN SATURATION: 98 % | HEIGHT: 67 IN | TEMPERATURE: 97.7 F

## 2020-06-24 DIAGNOSIS — I10 BENIGN ESSENTIAL HYPERTENSION: ICD-10-CM

## 2020-06-24 DIAGNOSIS — I65.22 ASYMPTOMATIC STENOSIS OF LEFT CAROTID ARTERY: ICD-10-CM

## 2020-06-24 DIAGNOSIS — I27.20 MILD PULMONARY HYPERTENSION (HCC): ICD-10-CM

## 2020-06-24 DIAGNOSIS — E78.2 MIXED HYPERLIPIDEMIA: ICD-10-CM

## 2020-06-24 PROCEDURE — 93000 ELECTROCARDIOGRAM COMPLETE: CPT | Performed by: INTERNAL MEDICINE

## 2020-06-24 PROCEDURE — 1036F TOBACCO NON-USER: CPT | Performed by: INTERNAL MEDICINE

## 2020-06-24 PROCEDURE — 3075F SYST BP GE 130 - 139MM HG: CPT | Performed by: INTERNAL MEDICINE

## 2020-06-24 PROCEDURE — 99214 OFFICE O/P EST MOD 30 MIN: CPT | Performed by: INTERNAL MEDICINE

## 2020-06-24 PROCEDURE — 4040F PNEUMOC VAC/ADMIN/RCVD: CPT | Performed by: INTERNAL MEDICINE

## 2020-06-24 PROCEDURE — 3078F DIAST BP <80 MM HG: CPT | Performed by: INTERNAL MEDICINE

## 2020-06-24 PROCEDURE — 1160F RVW MEDS BY RX/DR IN RCRD: CPT | Performed by: INTERNAL MEDICINE

## 2020-06-24 PROCEDURE — 3008F BODY MASS INDEX DOCD: CPT | Performed by: INTERNAL MEDICINE

## 2020-07-01 ENCOUNTER — OFFICE VISIT (OUTPATIENT)
Dept: GYNECOLOGIC ONCOLOGY | Facility: CLINIC | Age: 75
End: 2020-07-01
Payer: MEDICARE

## 2020-07-01 VITALS
SYSTOLIC BLOOD PRESSURE: 152 MMHG | HEIGHT: 67 IN | DIASTOLIC BLOOD PRESSURE: 88 MMHG | HEART RATE: 67 BPM | RESPIRATION RATE: 18 BRPM | TEMPERATURE: 97.9 F | BODY MASS INDEX: 29.35 KG/M2 | WEIGHT: 187 LBS

## 2020-07-01 DIAGNOSIS — Z85.42 HISTORY OF CANCER OF UTERUS: Primary | ICD-10-CM

## 2020-07-01 PROCEDURE — 1036F TOBACCO NON-USER: CPT | Performed by: OBSTETRICS & GYNECOLOGY

## 2020-07-01 PROCEDURE — 3079F DIAST BP 80-89 MM HG: CPT | Performed by: OBSTETRICS & GYNECOLOGY

## 2020-07-01 PROCEDURE — 1160F RVW MEDS BY RX/DR IN RCRD: CPT | Performed by: OBSTETRICS & GYNECOLOGY

## 2020-07-01 PROCEDURE — 3077F SYST BP >= 140 MM HG: CPT | Performed by: OBSTETRICS & GYNECOLOGY

## 2020-07-01 PROCEDURE — 4040F PNEUMOC VAC/ADMIN/RCVD: CPT | Performed by: OBSTETRICS & GYNECOLOGY

## 2020-07-01 PROCEDURE — 3008F BODY MASS INDEX DOCD: CPT | Performed by: OBSTETRICS & GYNECOLOGY

## 2020-07-01 PROCEDURE — 99213 OFFICE O/P EST LOW 20 MIN: CPT | Performed by: OBSTETRICS & GYNECOLOGY

## 2020-07-01 NOTE — ASSESSMENT & PLAN NOTE
Patient is stable without evidence of recurrence of disease  Her exam is unremarkable  She remains in a clinical remission  She will follow up in 3 months for repeat evaluation or earlier if symptoms warrant

## 2020-07-01 NOTE — PROGRESS NOTES
Assessment/Plan:    Problem List Items Addressed This Visit        Other    History of cancer of uterus - Primary     Patient is stable without evidence of recurrence of disease  Her exam is unremarkable  She remains in a clinical remission  She will follow up in 3 months for repeat evaluation or earlier if symptoms warrant  CHIEF COMPLAINT:  Surveillance endometrial cancer      Problem:  Cancer Staging  History of cancer of uterus  Staging form: Corpus Uteri - Carcinoma, AJCC 8th Edition  - Clinical: No stage assigned - Unsigned  - Pathologic stage from 8/13/2019: FIGO Stage IB, calculated as Stage Unknown (pT1b, pNX, cM0) - Signed by Contreras Avilez MD on 8/13/2019        Previous therapy:     History of cancer of uterus    5/9/2019 Biopsy     PATHOLOGY REPORT   Lajean Cassette SURGICAL #: [id number], [id number]   PATIENT ID: [id number]   SPECIMEN SOURCE: Endometrium   CLINICAL DATA: Provided Diagnosis Codes: N95 0   LMP: 01/01/1993   CLINICAL:      A  ENDOMETRIAL BIOPSY   GROSS:      A  RECEIVED IN FORMALIN AND LABELED WITH PATIENT          IDENTIFICATION, CONSISTS OF FRAGMENTS OF MUCOID, RED-TAN,          SOFT TISSUE AND BLOOD MEASURING 1 7 X 1 5 X 0 2 CM IN          AGGREGATE   THE SPECIMEN IS FILTERED AND SUBMITTED IN ONE          CASSETTE  DIAGNOSIS:      A  ATYPICAL COMPLEX HYPERPLASIA      7/10/2019 Initial Diagnosis     Endometrial cancer ([facility] [address] )      7/10/2019 Surgery     Total abdominal hysterectomy bilateral salpingo-oophorectomy performed by Dr Noah Ortiz for stage IB grade 1 endometrial cancer  Depth of invasion 7 of 9 mm  No lymph vascular space invasion  Patient is 69years old        8/13/2019 -  Cancer Staged     Staging form: Corpus Uteri - Carcinoma, AJCC 8th Edition  - Pathologic stage from 8/13/2019: FIGO Stage IB, calculated as Stage Unknown (pT1b, pNX, cM0) - Signed by Contreras Avilez MD on 8/13/2019  Residual tumor (R): R0 - None  Stage used in treatment planning: Yes  National guidelines used in treatment planning: Yes  Type of national guideline used in treatment planning: NCCN        8/30/2019 - 9/13/2019 Radiation     adjuvant high-dose rate vaginal brachytherapy  A dose of 3000 cGy in 5 fractions was delivered            Patient ID: Lupe العلي is a 70 y o  female  Patient is a very pleasant 63-year-old female with a history of stage IB grade grade 1 endometrial cancer who underwent surgical treatment in 2019  She subsequently underwent vaginal brachytherapy  Since then she has been without evidence of recurrence of disease  She was last seen 3 months ago and presents today for repeat evaluation  The patient was without complaint  Today, the patient is doing well  She denies significant abdominal pain, pelvic pain, nausea, vomiting, constipation, diarrhea, fevers, chills, or vaginal bleeding  The following portions of the patient's history were reviewed and updated as appropriate: allergies, current medications, past family history, past medical history, past social history and problem list     Review of Systems   Constitutional: Negative  HENT: Negative  Eyes: Negative  Respiratory: Negative  Cardiovascular: Negative  Gastrointestinal: Negative  Endocrine: Negative  Genitourinary: Negative  Musculoskeletal: Negative  Skin: Negative  Neurological: Negative  Hematological: Negative  Psychiatric/Behavioral: Negative          Current Outpatient Medications   Medication Sig Dispense Refill    amLODIPine (NORVASC) 2 5 mg tablet Take 1 tablet (2 5 mg total) by mouth daily 90 tablet 3    aspirin 81 MG tablet Take 81 mg by mouth daily at bedtime Last dose6/26/19      atenolol (TENORMIN) 25 mg tablet Take 1 tablet (25 mg total) by mouth daily at bedtime 90 tablet 3    atorvastatin (LIPITOR) 10 mg tablet Take 1 tablet (10 mg total) by mouth daily at bedtime 90 tablet 3    Calcium-Vitamin D (CALTRATE 600 PLUS-VIT D PO) Take by mouth daily at bedtime   Fexofenadine HCl (ALLEGRA PO) Take 10 mg by mouth as needed   pantoprazole (PROTONIX) 40 mg tablet Take 1 tablet (40 mg total) by mouth 2 (two) times a day 60 tablet 5    PARoxetine (PAXIL) 20 mg tablet Take 1 tablet (20 mg total) by mouth daily 90 tablet 3    VENTOLIN  (90 Base) MCG/ACT inhaler Inhale 2 puffs every 6 (six) hours as needed for wheezing 3 Inhaler 3    zolpidem (Ambien) 5 mg tablet Take 1 tablet (5 mg total) by mouth daily at bedtime as needed for sleep 90 tablet 0    cyclobenzaprine (FLEXERIL) 10 mg tablet Take 1 tablet (10 mg total) by mouth 3 (three) times a day as needed for muscle spasms (Patient not taking: Reported on 6/24/2020) 20 tablet 0     No current facility-administered medications for this visit  Objective:    Blood pressure 152/88, pulse 67, temperature 97 9 °F (36 6 °C), resp  rate 18, height 5' 7" (1 702 m), weight 84 8 kg (187 lb), not currently breastfeeding  Body mass index is 29 29 kg/m²  Body surface area is 1 96 meters squared  Physical Exam   Constitutional: She is oriented to person, place, and time  She appears well-developed and well-nourished  HENT:   Head: Normocephalic and atraumatic  Eyes: EOM are normal    Neck: Normal range of motion  Neck supple  No thyromegaly present  Cardiovascular: Normal rate, regular rhythm and normal heart sounds  Pulmonary/Chest: Effort normal and breath sounds normal    Abdominal: Soft  Bowel sounds are normal    Well healed laparoscopic incisions  Genitourinary:   Genitourinary Comments: -Normal external female genitalia, normal Bartholin's and [place]'s glands                  -Normal midline urethral meatus   No lesions notes                  -Bladder without fullness mass or tenderness                  -Vagina without lesion or discharge No significant cystocele or rectocele noted                  -Cervix surgically absent                  -Uterus surgically absent                  -Adnexae surgically absent                  - Anus without fissure of lesion     Musculoskeletal: Normal range of motion  Lymphadenopathy:     She has no cervical adenopathy  Neurological: She is alert and oriented to person, place, and time  Skin: Skin is warm and dry  Psychiatric: She has a normal mood and affect   Her behavior is normal

## 2020-07-01 NOTE — LETTER
July 1, 2020     [facility]    Patient: Ashley Ferris   YOB: 1945   Date of Visit: 7/1/2020       Dear Dr Joselyn Mendez: Thank you for referring Ashley Ferris to me for evaluation  Below are my notes for this consultation  If you have questions, please do not hesitate to call me  I look forward to following your patient along with you  Sincerely,        Cabrera Bravo MD        CC: MD Cabrera Yan MD  7/1/2020  2:35 PM  Sign at close encounter  Assessment/Plan:    Problem List Items Addressed This Visit        Other    History of cancer of uterus - Primary     Patient is stable without evidence of recurrence of disease  Her exam is unremarkable  She remains in a clinical remission  She will follow up in 3 months for repeat evaluation or earlier if symptoms warrant  CHIEF COMPLAINT:  Surveillance endometrial cancer      Problem:  Cancer Staging  History of cancer of uterus  Staging form: Corpus Uteri - Carcinoma, AJCC 8th Edition  - Clinical: No stage assigned - Unsigned  - Pathologic stage from 8/13/2019: FIGO Stage IB, calculated as Stage Unknown (pT1b, pNX, cM0) - Signed by Cabrera Bravo MD on 8/13/2019        Previous therapy:     History of cancer of uterus    5/9/2019 Biopsy     PATHOLOGY REPORT   Mendez Phan SURGICAL #: [id number], [id number]   PATIENT ID: [id number]   SPECIMEN SOURCE: Endometrium   CLINICAL DATA: Provided Diagnosis Codes: N95 0   LMP: 01/01/1993   CLINICAL:      A  ENDOMETRIAL BIOPSY   GROSS:      A  RECEIVED IN FORMALIN AND LABELED WITH PATIENT          IDENTIFICATION, CONSISTS OF FRAGMENTS OF MUCOID, RED-TAN,          SOFT TISSUE AND BLOOD MEASURING 1 7 X 1 5 X 0 2 CM IN          AGGREGATE   THE SPECIMEN IS FILTERED AND SUBMITTED IN ONE          CASSETTE     DIAGNOSIS:      A  ATYPICAL COMPLEX HYPERPLASIA      7/10/2019 Initial Diagnosis     Endometrial cancer ([facility] [address] )      7/10/2019 Surgery Total abdominal hysterectomy bilateral salpingo-oophorectomy performed by Dr Dayton Bird for stage IB grade 1 endometrial cancer  Depth of invasion 7 of 9 mm  No lymph vascular space invasion  Patient is 69years old  8/13/2019 -  Cancer Staged     Staging form: Corpus Uteri - Carcinoma, AJCC 8th Edition  - Pathologic stage from 8/13/2019: FIGO Stage IB, calculated as Stage Unknown (pT1b, pNX, cM0) - Signed by Erica Bernal MD on 8/13/2019  Residual tumor (R): R0 - None  Stage used in treatment planning: Yes  National guidelines used in treatment planning: Yes  Type of national guideline used in treatment planning: NCCN        8/30/2019 - 9/13/2019 Radiation     adjuvant high-dose rate vaginal brachytherapy  A dose of 3000 cGy in 5 fractions was delivered            Patient ID: Chayo Mathis is a 70 y o  female  Patient is a very pleasant 57-year-old female with a history of stage IB grade grade 1 endometrial cancer who underwent surgical treatment in 2019  She subsequently underwent vaginal brachytherapy  Since then she has been without evidence of recurrence of disease  She was last seen 3 months ago and presents today for repeat evaluation  The patient was without complaint  Today, the patient is doing well  She denies significant abdominal pain, pelvic pain, nausea, vomiting, constipation, diarrhea, fevers, chills, or vaginal bleeding  The following portions of the patient's history were reviewed and updated as appropriate: allergies, current medications, past family history, past medical history, past social history and problem list     Review of Systems   Constitutional: Negative  HENT: Negative  Eyes: Negative  Respiratory: Negative  Cardiovascular: Negative  Gastrointestinal: Negative  Endocrine: Negative  Genitourinary: Negative  Musculoskeletal: Negative  Skin: Negative  Neurological: Negative  Hematological: Negative      Psychiatric/Behavioral: Negative  Current Outpatient Medications   Medication Sig Dispense Refill    amLODIPine (NORVASC) 2 5 mg tablet Take 1 tablet (2 5 mg total) by mouth daily 90 tablet 3    aspirin 81 MG tablet Take 81 mg by mouth daily at bedtime Last dose6/26/19      atenolol (TENORMIN) 25 mg tablet Take 1 tablet (25 mg total) by mouth daily at bedtime 90 tablet 3    atorvastatin (LIPITOR) 10 mg tablet Take 1 tablet (10 mg total) by mouth daily at bedtime 90 tablet 3    Calcium-Vitamin D (CALTRATE 600 PLUS-VIT D PO) Take by mouth daily at bedtime   Fexofenadine HCl (ALLEGRA PO) Take 10 mg by mouth as needed   pantoprazole (PROTONIX) 40 mg tablet Take 1 tablet (40 mg total) by mouth 2 (two) times a day 60 tablet 5    PARoxetine (PAXIL) 20 mg tablet Take 1 tablet (20 mg total) by mouth daily 90 tablet 3    VENTOLIN  (90 Base) MCG/ACT inhaler Inhale 2 puffs every 6 (six) hours as needed for wheezing 3 Inhaler 3    zolpidem (Ambien) 5 mg tablet Take 1 tablet (5 mg total) by mouth daily at bedtime as needed for sleep 90 tablet 0    cyclobenzaprine (FLEXERIL) 10 mg tablet Take 1 tablet (10 mg total) by mouth 3 (three) times a day as needed for muscle spasms (Patient not taking: Reported on 6/24/2020) 20 tablet 0     No current facility-administered medications for this visit  Objective:    Blood pressure 152/88, pulse 67, temperature 97 9 °F (36 6 °C), resp  rate 18, height 5' 7" (1 702 m), weight 84 8 kg (187 lb), not currently breastfeeding  Body mass index is 29 29 kg/m²  Body surface area is 1 96 meters squared  Physical Exam   Constitutional: She is oriented to person, place, and time  She appears well-developed and well-nourished  HENT:   Head: Normocephalic and atraumatic  Eyes: EOM are normal    Neck: Normal range of motion  Neck supple  No thyromegaly present  Cardiovascular: Normal rate, regular rhythm and normal heart sounds     Pulmonary/Chest: Effort normal and breath sounds normal    Abdominal: Soft  Bowel sounds are normal    Well healed laparoscopic incisions  Genitourinary:   Genitourinary Comments: -Normal external female genitalia, normal Bartholin's and [place]'s glands                  -Normal midline urethral meatus  No lesions notes                  -Bladder without fullness mass or tenderness                  -Vagina without lesion or discharge No significant cystocele or rectocele noted                  -Cervix surgically absent                  -Uterus surgically absent                  -Adnexae surgically absent                  - Anus without fissure of lesion     Musculoskeletal: Normal range of motion  Lymphadenopathy:     She has no cervical adenopathy  Neurological: She is alert and oriented to person, place, and time  Skin: Skin is warm and dry  Psychiatric: She has a normal mood and affect   Her behavior is normal

## 2020-07-06 DIAGNOSIS — I10 BENIGN ESSENTIAL HYPERTENSION: ICD-10-CM

## 2020-07-06 RX ORDER — AMLODIPINE BESYLATE 2.5 MG/1
2.5 TABLET ORAL DAILY
Qty: 90 TABLET | Refills: 1 | Status: SHIPPED | OUTPATIENT
Start: 2020-07-06

## 2020-07-17 ENCOUNTER — APPOINTMENT (OUTPATIENT)
Dept: LAB | Facility: HOSPITAL | Age: 75
End: 2020-07-17
Attending: INTERNAL MEDICINE
Payer: MEDICARE

## 2020-07-17 ENCOUNTER — TRANSCRIBE ORDERS (OUTPATIENT)
Dept: ADMINISTRATIVE | Facility: HOSPITAL | Age: 75
End: 2020-07-17

## 2020-07-17 DIAGNOSIS — E78.2 MIXED HYPERLIPIDEMIA: ICD-10-CM

## 2020-07-17 DIAGNOSIS — I10 BENIGN ESSENTIAL HYPERTENSION: ICD-10-CM

## 2020-07-17 LAB
ALBUMIN SERPL BCP-MCNC: 3.4 G/DL (ref 3.5–5)
ALP SERPL-CCNC: 202 U/L (ref 46–116)
ALT SERPL W P-5'-P-CCNC: 23 U/L (ref 12–78)
ANION GAP SERPL CALCULATED.3IONS-SCNC: 6 MMOL/L (ref 4–13)
AST SERPL W P-5'-P-CCNC: 17 U/L (ref 5–45)
BASOPHILS # BLD AUTO: 0.07 THOUSANDS/ΜL (ref 0–0.1)
BASOPHILS NFR BLD AUTO: 1 % (ref 0–1)
BILIRUB SERPL-MCNC: 0.6 MG/DL (ref 0.2–1)
BUN SERPL-MCNC: 12 MG/DL (ref 5–25)
CALCIUM SERPL-MCNC: 8.5 MG/DL (ref 8.3–10.1)
CHLORIDE SERPL-SCNC: 105 MMOL/L (ref 100–108)
CHOLEST SERPL-MCNC: 139 MG/DL (ref 50–200)
CO2 SERPL-SCNC: 32 MMOL/L (ref 21–32)
CREAT SERPL-MCNC: 0.64 MG/DL (ref 0.6–1.3)
EOSINOPHIL # BLD AUTO: 0.19 THOUSAND/ΜL (ref 0–0.61)
EOSINOPHIL NFR BLD AUTO: 3 % (ref 0–6)
ERYTHROCYTE [DISTWIDTH] IN BLOOD BY AUTOMATED COUNT: 21.2 % (ref 11.6–15.1)
GFR SERPL CREATININE-BSD FRML MDRD: 89 ML/MIN/1.73SQ M
GLUCOSE P FAST SERPL-MCNC: 99 MG/DL (ref 65–99)
HCT VFR BLD AUTO: 33.6 % (ref 34.8–46.1)
HDLC SERPL-MCNC: 41 MG/DL
HGB BLD-MCNC: 9.4 G/DL (ref 11.5–15.4)
IMM GRANULOCYTES # BLD AUTO: 0.04 THOUSAND/UL (ref 0–0.2)
IMM GRANULOCYTES NFR BLD AUTO: 1 % (ref 0–2)
LDLC SERPL CALC-MCNC: 72 MG/DL (ref 0–100)
LYMPHOCYTES # BLD AUTO: 1.21 THOUSANDS/ΜL (ref 0.6–4.47)
LYMPHOCYTES NFR BLD AUTO: 16 % (ref 14–44)
MCH RBC QN AUTO: 20.5 PG (ref 26.8–34.3)
MCHC RBC AUTO-ENTMCNC: 28 G/DL (ref 31.4–37.4)
MCV RBC AUTO: 73 FL (ref 82–98)
MONOCYTES # BLD AUTO: 0.48 THOUSAND/ΜL (ref 0.17–1.22)
MONOCYTES NFR BLD AUTO: 7 % (ref 4–12)
NEUTROPHILS # BLD AUTO: 5.37 THOUSANDS/ΜL (ref 1.85–7.62)
NEUTS SEG NFR BLD AUTO: 72 % (ref 43–75)
NONHDLC SERPL-MCNC: 98 MG/DL
NRBC BLD AUTO-RTO: 0 /100 WBCS
PLATELET # BLD AUTO: 386 THOUSANDS/UL (ref 149–390)
PMV BLD AUTO: 9.9 FL (ref 8.9–12.7)
POTASSIUM SERPL-SCNC: 4.1 MMOL/L (ref 3.5–5.3)
PROT SERPL-MCNC: 7.4 G/DL (ref 6.4–8.2)
RBC # BLD AUTO: 4.59 MILLION/UL (ref 3.81–5.12)
SODIUM SERPL-SCNC: 143 MMOL/L (ref 136–145)
TRIGL SERPL-MCNC: 131 MG/DL
WBC # BLD AUTO: 7.36 THOUSAND/UL (ref 4.31–10.16)

## 2020-07-17 PROCEDURE — 80053 COMPREHEN METABOLIC PANEL: CPT

## 2020-07-17 PROCEDURE — 80061 LIPID PANEL: CPT

## 2020-07-17 PROCEDURE — 36415 COLL VENOUS BLD VENIPUNCTURE: CPT

## 2020-07-17 PROCEDURE — 85025 COMPLETE CBC W/AUTO DIFF WBC: CPT

## 2020-08-10 NOTE — PROGRESS NOTES
Assessment/Plan:    1  Benign essential hypertension  Assessment & Plan:  BP is well controlled on current medications and she will continue  2  Mixed hyperlipidemia  Assessment & Plan:  Reviewed labs and lipids are at goal on atorvastatin  Discussed need for low fat diet and exercise  3  Dermatitis  -     Ambulatory referral to Dermatology; Future    4  Major depressive disorder with single episode, in full remission [facility])  Assessment & Plan: This seems to be well controlled on current dose of paroxetine and she will continue  5  Anemia, unspecified type  Assessment & Plan: This is worsening and she has upcoming appointment with GI for evaluation  6  BMI 29 0-29 9,adult        BMI Counseling: Body mass index is 29 13 kg/m²  The BMI is above normal  Nutrition recommendations include reducing portion sizes and decreasing overall calorie intake  Exercise recommendations include moderate aerobic physical activity for 150 minutes/week  There are no Patient Instructions on file for this visit  Return in about 4 months (around 12/12/2020)  Subjective:      Patient ID: Taylor Castro is a 70 y o  female  Chief Complaint   Patient presents with    Hypertension     [facility]       Here for follow up of hypertension, hyperlipidemia, depression  She is feeling okay physically  She has been having a rash on her arms for two months  Was put on a prednisone taper without relief  She has not seen a dermatologist for this but is willing to do so  We discussed her labs  She has a worsening anemia  Sees Dr Brandan Garcia on 9/3/20  Denies chest pain, dyspnea, cough, fever  Just saw cardiologist and had a good report  She is going to be moving to [place] to take care of a sister who is ill  She has to sell her house and all its contents  She feels she is dealing well with the stress and feels her mood is well controlled         The following portions of the patient's history were reviewed and updated as appropriate: allergies, current medications, past family history, past medical history, past social history, past surgical history and problem list     Review of Systems   Constitutional: Negative  Respiratory: Negative  Cardiovascular: Negative  Skin: Positive for rash  Psychiatric/Behavioral: Negative  Current Outpatient Medications   Medication Sig Dispense Refill    amLODIPine (NORVASC) 2 5 mg tablet Take 1 tablet (2 5 mg total) by mouth daily 90 tablet 1    aspirin 81 MG tablet Take 81 mg by mouth daily at bedtime Last dose6/26/19      atenolol (TENORMIN) 25 mg tablet Take 1 tablet (25 mg total) by mouth daily at bedtime 90 tablet 3    atorvastatin (LIPITOR) 10 mg tablet Take 1 tablet (10 mg total) by mouth daily at bedtime 90 tablet 3    Calcium-Vitamin D (CALTRATE 600 PLUS-VIT D PO) Take by mouth daily at bedtime   Fexofenadine HCl (ALLEGRA PO) Take 10 mg by mouth as needed   pantoprazole (PROTONIX) 40 mg tablet Take 1 tablet (40 mg total) by mouth 2 (two) times a day 60 tablet 5    PARoxetine (PAXIL) 20 mg tablet Take 1 tablet (20 mg total) by mouth daily 90 tablet 3    VENTOLIN  (90 Base) MCG/ACT inhaler Inhale 2 puffs every 6 (six) hours as needed for wheezing 3 Inhaler 3    zolpidem (Ambien) 5 mg tablet Take 1 tablet (5 mg total) by mouth daily at bedtime as needed for sleep 90 tablet 0     No current facility-administered medications for this visit  Objective:    /82   Pulse 64   Temp 98 5 °F (36 9 °C)   Resp 16   Ht 5' 7" (1 702 m)   Wt 84 4 kg (186 lb)   BMI 29 13 kg/m²        Physical Exam  Constitutional:       Appearance: She is well-developed  Eyes:      Conjunctiva/sclera: Conjunctivae normal    Neck:      Musculoskeletal: Neck supple  Thyroid: No thyromegaly  Vascular: No JVD  Cardiovascular:      Rate and Rhythm: Normal rate and regular rhythm  Heart sounds: Normal heart sounds  No murmur   No friction rub  No gallop  Pulmonary:      Effort: Pulmonary effort is normal       Breath sounds: Normal breath sounds  No wheezing or rales  Abdominal:      General: Bowel sounds are normal  There is no distension  Palpations: Abdomen is soft  Tenderness: There is no abdominal tenderness  Skin:     Comments: Multiple areas of macular hyperpigmented eruption bilateral arms and dorsae of hands  Psychiatric:         Mood and Affect: Mood normal          Behavior: Behavior normal          Thought Content:  Thought content normal          Judgment: Judgment normal                 Pieter Santamaria MD

## 2020-08-12 ENCOUNTER — OFFICE VISIT (OUTPATIENT)
Dept: FAMILY MEDICINE CLINIC | Facility: CLINIC | Age: 75
End: 2020-08-12
Payer: MEDICARE

## 2020-08-12 VITALS
BODY MASS INDEX: 29.19 KG/M2 | HEIGHT: 67 IN | TEMPERATURE: 98.5 F | WEIGHT: 186 LBS | SYSTOLIC BLOOD PRESSURE: 138 MMHG | DIASTOLIC BLOOD PRESSURE: 82 MMHG | HEART RATE: 64 BPM | RESPIRATION RATE: 16 BRPM

## 2020-08-12 DIAGNOSIS — L30.9 DERMATITIS: ICD-10-CM

## 2020-08-12 DIAGNOSIS — E78.2 MIXED HYPERLIPIDEMIA: ICD-10-CM

## 2020-08-12 DIAGNOSIS — D64.9 ANEMIA, UNSPECIFIED TYPE: ICD-10-CM

## 2020-08-12 DIAGNOSIS — F32.5 MAJOR DEPRESSIVE DISORDER WITH SINGLE EPISODE, IN FULL REMISSION (HCC): ICD-10-CM

## 2020-08-12 DIAGNOSIS — I10 BENIGN ESSENTIAL HYPERTENSION: Primary | ICD-10-CM

## 2020-08-12 PROCEDURE — 4040F PNEUMOC VAC/ADMIN/RCVD: CPT | Performed by: INTERNAL MEDICINE

## 2020-08-12 PROCEDURE — 1036F TOBACCO NON-USER: CPT | Performed by: INTERNAL MEDICINE

## 2020-08-12 PROCEDURE — 3008F BODY MASS INDEX DOCD: CPT | Performed by: INTERNAL MEDICINE

## 2020-08-12 PROCEDURE — 3075F SYST BP GE 130 - 139MM HG: CPT | Performed by: INTERNAL MEDICINE

## 2020-08-12 PROCEDURE — 99214 OFFICE O/P EST MOD 30 MIN: CPT | Performed by: INTERNAL MEDICINE

## 2020-08-12 PROCEDURE — 1160F RVW MEDS BY RX/DR IN RCRD: CPT | Performed by: INTERNAL MEDICINE

## 2020-08-12 PROCEDURE — 3079F DIAST BP 80-89 MM HG: CPT | Performed by: INTERNAL MEDICINE

## 2020-08-18 DIAGNOSIS — G47.00 INSOMNIA, UNSPECIFIED TYPE: ICD-10-CM

## 2020-08-18 RX ORDER — ZOLPIDEM TARTRATE 5 MG/1
5 TABLET ORAL
Qty: 90 TABLET | Refills: 0 | Status: SHIPPED | OUTPATIENT
Start: 2020-08-18

## 2020-08-24 DIAGNOSIS — F32.A DEPRESSION, UNSPECIFIED DEPRESSION TYPE: ICD-10-CM

## 2020-08-24 RX ORDER — PAROXETINE HYDROCHLORIDE 20 MG/1
20 TABLET, FILM COATED ORAL DAILY
Qty: 90 TABLET | Refills: 3 | Status: SHIPPED | OUTPATIENT
Start: 2020-08-24

## 2020-09-03 ENCOUNTER — OFFICE VISIT (OUTPATIENT)
Dept: GASTROENTEROLOGY | Facility: CLINIC | Age: 75
End: 2020-09-03
Payer: MEDICARE

## 2020-09-03 VITALS — BODY MASS INDEX: 28.88 KG/M2 | TEMPERATURE: 97.6 F | HEIGHT: 67 IN | WEIGHT: 184 LBS | HEART RATE: 76 BPM

## 2020-09-03 DIAGNOSIS — R74.8 ELEVATED ALKALINE PHOSPHATASE LEVEL: Primary | ICD-10-CM

## 2020-09-03 DIAGNOSIS — K21.9 GASTROESOPHAGEAL REFLUX DISEASE, ESOPHAGITIS PRESENCE NOT SPECIFIED: ICD-10-CM

## 2020-09-03 PROCEDURE — 99213 OFFICE O/P EST LOW 20 MIN: CPT | Performed by: INTERNAL MEDICINE

## 2020-09-03 RX ORDER — PANTOPRAZOLE SODIUM 40 MG/1
40 TABLET, DELAYED RELEASE ORAL 2 TIMES DAILY
Qty: 180 TABLET | Refills: 5 | Status: SHIPPED | OUTPATIENT
Start: 2020-09-03

## 2020-09-03 NOTE — LETTER
September 3, 2020     Ирина Rice MD  [address]  [place] [address]    Patient: Kendall Preciado   YOB: 1945   Date of Visit: 9/3/2020       Dear Dr Diez Place:    Thank you for referring Kendall Preciado to me for evaluation  Below are my notes for this consultation  If you have questions, please do not hesitate to call me  I look forward to following your patient along with you  Sincerely,        Roxanne Ambriz MD        CC: No Recipients  Roxanne Ambriz MD  9/3/2020 10:13 PM  Sign when Signing Visit  [address] [facility]  Kendall Preciado 70 y o  female MRN: [id number]            Assessment & Plan:    Pleasant 79-year-old female with a large hiatal hernia  Has been symptomatic in the past with coffee-ground emesis, has had intermittent dysphagia  1  Symptomatic hiatal hernia with GERD, known Donn's ulcers:  -patient has had continued anemia  -she denies any melena rectal bleeding,  -she reports compliance with her PPI therapy  -unfortunately the patient at this point reports she will be moving out shortly to [place]  -she would like to establish care in [place] for evaluation of a hiatal hernia possible surgical repair  -she was instructed to give us call with any change or worsening symptoms    2  Anemia:  Secondary Donn's ulcers  -continue PPI therapy  -patient should be receiving iron supplementation    3  Elevated alkaline phosphatase:  Incidentally noted on recent laboratory studies  -we will check right upper quadrant ultrasound  -may be results of past radiation therapy               _____________________________________________________________        CC: Follow-up    HPI:  Kendall Preciado is a 70 y  o female who is here for follow-up  As you know this is a 79-year-old female who is symptomatic large hiatal hernia  Recently admitted with some coffee-ground emesis  She is taking pantoprazole twice daily patient reports that she has been doing well  Tolerating diet  Occasional bouts of dysphagia  No significant Schatzki's ring which she does have significant presbyesophagus  Reports that her weight has been stable  She has been eating well  Denies any abdominal pain, reports having regular bowel movements  Since her last visit patient underwent hysterectomy for uterine cancer, also had radiation therapy  She reports that because of that she has not followed up with thoracic surgery for whom she was referred for evaluation of her hiatal hernia  Patient notes that recently her sister and has had poor health and she is moving out [place]  ROS:  The remainder of the ROS was negative except for the pertinent positives mentioned in HPI  Allergies: Shellfish-derived products and Sulfa antibiotics    Medications:   Current Outpatient Medications:     amLODIPine (NORVASC) 2 5 mg tablet, Take 1 tablet (2 5 mg total) by mouth daily, Disp: 90 tablet, Rfl: 1    aspirin 81 MG tablet, Take 81 mg by mouth daily at bedtime Last dose6/26/19, Disp: , Rfl:     atenolol (TENORMIN) 25 mg tablet, Take 1 tablet (25 mg total) by mouth daily at bedtime, Disp: 90 tablet, Rfl: 3    atorvastatin (LIPITOR) 10 mg tablet, Take 1 tablet (10 mg total) by mouth daily at bedtime, Disp: 90 tablet, Rfl: 3    Calcium-Vitamin D (CALTRATE 600 PLUS-VIT D PO), Take by mouth daily at bedtime  , Disp: , Rfl:     Fexofenadine HCl (ALLEGRA PO), Take 10 mg by mouth as needed  , Disp: , Rfl:     pantoprazole (PROTONIX) 40 mg tablet, Take 1 tablet (40 mg total) by mouth 2 (two) times a day, Disp: 180 tablet, Rfl: 5    PARoxetine (PAXIL) 20 mg tablet, Take 1 tablet (20 mg total) by mouth daily, Disp: 90 tablet, Rfl: 3    VENTOLIN  (90 Base) MCG/ACT inhaler, Inhale 2 puffs every 6 (six) hours as needed for wheezing, Disp: 3 Inhaler, Rfl: 3    zolpidem (Ambien) 5 mg tablet, Take 1 tablet (5 mg total) by mouth daily at bedtime as needed for sleep, Disp: 90 tablet, Rfl: 0    Past Medical History:   Diagnosis Date    Abnormal blood chemistry     abnormal biochemistry findings    Abnormal findings on diagnostic imaging of urinary organs     resolved 07/01/2016    Abnormal glucose     resolved 07/01/2016    Abnormal thyroid exam     resolved 07/01/2016    Abnormal thyroid screen (blood)     resolved 07/01/2016    Abnormal TSH     last assessed 03/07/2016    Allergic     Allergic rhinitis     last assessed 06/25/2015    Anemia     Arthritis     Asthma     Closed Colles' fracture     right wrist, last assessed 01/17/2014    Coronary artery disease     "blockages"  in left carotid artery  - not in the heart(per pt)            sees Dr Delroy Felix Hawk Depression     with insomnia    Diverticulosis     occ diverticulitis    Edema     last assessed 03/11/2015    Environmental allergies     GERD (gastroesophageal reflux disease)     Hematuria     last assessed 11/07/2013    Hiatal hernia     History of transfusion     had 2 units-11/16    Hyperlipidemia     Hypertension     Iron deficiency anemia     chronic-receives iron infusion usually every 6 months    [facility]     last assessed 12/07/2016    Knee mass     last assessed 06/11/2014    Orthostatic hypotension     last assessed 03/26/2014    Osteoarthrosis of knee     last assessed 11/12/2014    Palpitations     PMB (postmenopausal bleeding)     last assessed 11/07/2013    Polyarthralgia     last assessed 06/09/2014    Posthemorrhagic anemia     last assessed 11/07/2013       Past Surgical History:   Procedure Laterality Date    ABDOMINAL ADHESION SURGERY N/A 7/10/2019    Procedure: EXTENSIVE LYSIS ADHESIONS, OVER SEW SEROSAL TEAR, CLOSURE MESENTERIC DEFECT;  Surgeon: Jasmin Richardson MD;  Location: [address] [place] [address];  Service: General    APPENDECTOMY      BREAST SURGERY Bilateral     exc  lashawn masses-benign    CATARACT EXTRACTION      CATARACT EXTRACTION W/ INTRAOCULAR LENS IMPLANT Right 2/6/2017 Procedure: EXTRACTION EXTRACAPSULAR CATARACT PHACO INTRAOCULAR LENS (IOL); Surgeon: Consuella Brunner, MD;  Location: [facility] MAIN OR;  Service:     CATARACT EXTRACTION W/ INTRAOCULAR LENS IMPLANT Left 1/9/2017    Procedure: EXTRACTION EXTRACAPSULAR CATARACT PHACO INTRAOCULAR LENS (IOL); Surgeon: Consuella Brunner, MD;  Location: [facility] MAIN OR;  Service:     CHOLECYSTECTOMY      lap    COLONOSCOPY N/A 12/3/2016    Procedure: COLONOSCOPY;  Surgeon: Cheryl Pugh MD;  [facility]; Service:     CYSTOSCOPY  06/2011    bladder biopsy, lashawn  retrogrades    DILATION AND CURETTAGE OF UTERUS      x2    ESOPHAGOGASTRODUODENOSCOPY N/A 12/2/2016    Procedure: ESOPHAGOGASTRODUODENOSCOPY (EGD); Surgeon: Rebecca Laird MD;  Location: [facility] GI LAB; Service:     ESOPHAGOGASTRODUODENOSCOPY N/A 6/5/2017    Procedure: ESOPHAGOGASTRODUODENOSCOPY (EGD); Surgeon: Cheryl Pugh MD;  Location: [facility] GI LAB; Service:     FRACTURE SURGERY Right     distal radius repair w/hardware    HAND TENDON SURGERY Right     laceration repair    HERNIA REPAIR      umbilical    HYSTERECTOMY  07/10/2019    IR BIOPSY LIVER MASS  12/12/2018    KNEE SURGERY Right 07/03/2014    mass removed    PELVIC LAPAROSCOPY Bilateral 7/10/2019    Procedure: SALPINGO-OOPHORECTOMY, LAPAROSCOPIC;  Surgeon: Wayne Willis MD;  Location: [address] [place] [address];  Service: Gynecology    DC LAP,VAG HYST,UTERUS 250GMS/<,SALP-OOPH N/A 7/10/2019    Procedure: HYSTERECTOMY LAPAROSCOPIC ASSISTED VAGINAL (LAVH);   Surgeon: Wayne Willis MD;  Location: [address] [place] [address];  Service: Gynecology    TOTAL SHOULDER ARTHROPLASTY Right 7/14/2016    Procedure: ARTHROPLASTY SHOULDER with subacromial decompression, distal clavicle excision and possible rotator cuff repair,limited debridement of laboral tear;  Surgeon: Karen Santo MD;  Location: [facility] MAIN OR;  Service:     WRIST SURGERY Right 12/2013    repair fracture with hardware       Family History   Problem Relation Age of Onset  Cancer Father [place]        colon     Hypertension Father     Cancer Paternal Grandmother         breast    Stroke Mother         TIA        reports that she has never smoked  She has never used smokeless tobacco  She reports previous alcohol use  She reports that she does not use drugs        Physical Exam:    Pulse 76   Temp 97 6 °F (36 4 °C)   Ht 5' 7" (1 702 m)   Wt 83 5 kg (184 lb)   BMI 28 82 kg/m²     Gen: wn/wd, NAD  HEENT: anicteric, MMM, no cervical LAD  CVS: RRR, no m/r/g  CHEST: CTA b/l  ABD: +BS, soft, NT,ND, no hepatosplenomegaly  EXT: no c/c/e  NEURO: aaox3  SKIN: NO rashes

## 2020-09-04 ENCOUNTER — TELEPHONE (OUTPATIENT)
Dept: GASTROENTEROLOGY | Facility: CLINIC | Age: 75
End: 2020-09-04

## 2020-09-04 NOTE — PROGRESS NOTES
[facility]  Keshia Chavez 70 y o  female MRN: [id number]            Assessment & Plan:    Pleasant 79-year-old female with a large hiatal hernia  Has been symptomatic in the past with coffee-ground emesis, has had intermittent dysphagia  1  Symptomatic hiatal hernia with GERD, known Donn's ulcers:  -patient has had continued anemia  -she denies any melena rectal bleeding,  -she reports compliance with her PPI therapy  -unfortunately the patient at this point reports she will be moving out shortly to [place]  -she would like to establish care in [place] for evaluation of a hiatal hernia possible surgical repair  -she was instructed to give us call with any change or worsening symptoms    2  Anemia:  Secondary Donn's ulcers  -continue PPI therapy  -patient should be receiving iron supplementation    3  Elevated alkaline phosphatase:  Incidentally noted on recent laboratory studies  -we will check right upper quadrant ultrasound  -may be results of past radiation therapy               _____________________________________________________________        CC: Follow-up    HPI:  Keshia Chavez is a 70 y  o female who is here for follow-up  As you know this is a 79-year-old female who is symptomatic large hiatal hernia  Recently admitted with some coffee-ground emesis  She is taking pantoprazole twice daily patient reports that she has been doing well  Tolerating diet  Occasional bouts of dysphagia  No significant Schatzki's ring which she does have significant presbyesophagus  Reports that her weight has been stable  She has been eating well  Denies any abdominal pain, reports having regular bowel movements  Since her last visit patient underwent hysterectomy for uterine cancer, also had radiation therapy  She reports that because of that she has not followed up with thoracic surgery for whom she was referred for evaluation of her hiatal hernia      Patient notes that recently her sister and has had poor health and she is moving out [place]  ROS:  The remainder of the ROS was negative except for the pertinent positives mentioned in HPI  Allergies: Shellfish-derived products and Sulfa antibiotics    Medications:   Current Outpatient Medications:     amLODIPine (NORVASC) 2 5 mg tablet, Take 1 tablet (2 5 mg total) by mouth daily, Disp: 90 tablet, Rfl: 1    aspirin 81 MG tablet, Take 81 mg by mouth daily at bedtime Last dose6/26/19, Disp: , Rfl:     atenolol (TENORMIN) 25 mg tablet, Take 1 tablet (25 mg total) by mouth daily at bedtime, Disp: 90 tablet, Rfl: 3    atorvastatin (LIPITOR) 10 mg tablet, Take 1 tablet (10 mg total) by mouth daily at bedtime, Disp: 90 tablet, Rfl: 3    Calcium-Vitamin D (CALTRATE 600 PLUS-VIT D PO), Take by mouth daily at bedtime  , Disp: , Rfl:     Fexofenadine HCl (ALLEGRA PO), Take 10 mg by mouth as needed  , Disp: , Rfl:     pantoprazole (PROTONIX) 40 mg tablet, Take 1 tablet (40 mg total) by mouth 2 (two) times a day, Disp: 180 tablet, Rfl: 5    PARoxetine (PAXIL) 20 mg tablet, Take 1 tablet (20 mg total) by mouth daily, Disp: 90 tablet, Rfl: 3    VENTOLIN  (90 Base) MCG/ACT inhaler, Inhale 2 puffs every 6 (six) hours as needed for wheezing, Disp: 3 Inhaler, Rfl: 3    zolpidem (Ambien) 5 mg tablet, Take 1 tablet (5 mg total) by mouth daily at bedtime as needed for sleep, Disp: 90 tablet, Rfl: 0    Past Medical History:   Diagnosis Date    Abnormal blood chemistry     abnormal biochemistry findings    Abnormal findings on diagnostic imaging of urinary organs     resolved 07/01/2016    Abnormal glucose     resolved 07/01/2016    Abnormal thyroid exam     resolved 07/01/2016    Abnormal thyroid screen (blood)     resolved 07/01/2016    Abnormal TSH     last assessed 03/07/2016    Allergic     Allergic rhinitis     last assessed 06/25/2015    Anemia     Arthritis     Asthma     Closed Colles' fracture     right wrist, last assessed 01/17/2014    Coronary artery disease     "blockages"  in left carotid artery  - not in the heart(per pt)            sees Dr Lord Munson  Herbie Andrew Depression     with insomnia    Diverticulosis     occ diverticulitis    Edema     last assessed 03/11/2015    Environmental allergies     GERD (gastroesophageal reflux disease)     Hematuria     last assessed 11/07/2013    Hiatal hernia     History of transfusion     had 2 units-11/16    Hyperlipidemia     Hypertension     Iron deficiency anemia     chronic-receives iron infusion usually every 6 months    Ischemic colitis ([facility] )     last assessed 12/07/2016    Knee mass     last assessed 06/11/2014    Orthostatic hypotension     last assessed 03/26/2014    Osteoarthrosis of knee     last assessed 11/12/2014    Palpitations     PMB (postmenopausal bleeding)     last assessed 11/07/2013    Polyarthralgia     last assessed 06/09/2014    Posthemorrhagic anemia     last assessed 11/07/2013       Past Surgical History:   Procedure Laterality Date    ABDOMINAL ADHESION SURGERY N/A 7/10/2019    Procedure: EXTENSIVE LYSIS ADHESIONS, OVER SEW SEROSAL TEAR, CLOSURE MESENTERIC DEFECT;  Surgeon: Olivia Renee MD;  Location: [address] [place] [address];  Service: General    APPENDECTOMY      BREAST SURGERY Bilateral     exc  lashawn masses-benign    CATARACT EXTRACTION      CATARACT EXTRACTION W/ INTRAOCULAR LENS IMPLANT Right 2/6/2017    Procedure: EXTRACTION EXTRACAPSULAR CATARACT PHACO INTRAOCULAR LENS (IOL); Surgeon: Yolande Lizama MD;  Location: [facility] OR;  Service:     CATARACT EXTRACTION W/ INTRAOCULAR LENS IMPLANT Left 1/9/2017    Procedure: EXTRACTION EXTRACAPSULAR CATARACT PHACO INTRAOCULAR LENS (IOL); Surgeon: Yolande Lizama MD;  Location: [facility] OR;  Service:     CHOLECYSTECTOMY      lap    COLONOSCOPY N/A 12/3/2016    Procedure: COLONOSCOPY;  Surgeon: Son Elmore MD;  Location: [facility] GI LAB;   Service:     CYSTOSCOPY  06/2011    bladder biopsy, lashawn  retrogrades    DILATION AND CURETTAGE OF UTERUS      x2    ESOPHAGOGASTRODUODENOSCOPY N/A 12/2/2016    Procedure: ESOPHAGOGASTRODUODENOSCOPY (EGD); Surgeon: Jd Sood MD;  Location: [facility] GI LAB; Service:     ESOPHAGOGASTRODUODENOSCOPY N/A 6/5/2017    Procedure: ESOPHAGOGASTRODUODENOSCOPY (EGD); Surgeon: Colton Villalta MD;  Location: [facility] GI LAB; Service:     FRACTURE SURGERY Right     distal radius repair w/hardware    HAND TENDON SURGERY Right     laceration repair    HERNIA REPAIR      umbilical    HYSTERECTOMY  07/10/2019    IR BIOPSY LIVER MASS  12/12/2018    KNEE SURGERY Right 07/03/2014    mass removed    PELVIC LAPAROSCOPY Bilateral 7/10/2019    Procedure: SALPINGO-OOPHORECTOMY, LAPAROSCOPIC;  Surgeon: Kasey Carreon MD;  Location: [address] [place] [address];  Service: Gynecology    SD LAP,VAG HYST,UTERUS 250GMS/<,SALP-OOPH N/A 7/10/2019    Procedure: HYSTERECTOMY LAPAROSCOPIC ASSISTED VAGINAL (LAVH); Surgeon: Kasey Carreon MD;  Location: [address] [place] [address];  Service: Gynecology    TOTAL SHOULDER ARTHROPLASTY Right 7/14/2016    Procedure: ARTHROPLASTY SHOULDER with subacromial decompression, distal clavicle excision and possible rotator cuff repair,limited debridement of laboral tear;  Surgeon: Libby Ramirez MD;  Location: [facility] MAIN OR;  Service:     WRIST SURGERY Right 12/2013    repair fracture with hardware       Family History   Problem Relation Age of Onset    Cancer Father [de-identified]        colon     Hypertension Father     Cancer Paternal Grandmother         breast    Stroke Mother         TIA        reports that she has never smoked  She has never used smokeless tobacco  She reports previous alcohol use  She reports that she does not use drugs        Physical Exam:    Pulse 76   Temp 97 6 °F (36 4 °C)   Ht 5' 7" (1 702 m)   Wt 83 5 kg (184 lb)   BMI 28 82 kg/m²     Gen: wn/wd, NAD  HEENT: anicteric, MMM, no cervical LAD  CVS: RRR, no m/r/g  CHEST: CTA b/l  ABD: +BS, soft, NT,ND, no hepatosplenomegaly  EXT: no c/c/e  NEURO: aaox3  SKIN: NO rashes

## 2020-09-04 NOTE — TELEPHONE ENCOUNTER
----- Message from Alyssa Rios MD sent at 9/3/2020 10:12 PM EDT -----  Please inform patient that after reviewing her laboratory studies she should take iron supplements, 1 tablet daily

## 2020-09-11 ENCOUNTER — HOSPITAL ENCOUNTER (OUTPATIENT)
Dept: RADIOLOGY | Facility: HOSPITAL | Age: 75
Discharge: HOME | End: 2020-09-11
Attending: INTERNAL MEDICINE
Payer: MEDICARE

## 2020-09-11 DIAGNOSIS — R74.8 ELEVATED ALKALINE PHOSPHATASE LEVEL: ICD-10-CM

## 2020-09-11 PROCEDURE — 76705 ECHO EXAM OF ABDOMEN: CPT

## 2020-09-14 DIAGNOSIS — I10 BENIGN ESSENTIAL HYPERTENSION: ICD-10-CM

## 2020-09-14 RX ORDER — ATENOLOL 25 MG/1
25 TABLET ORAL
Qty: 90 TABLET | Refills: 0 | Status: SHIPPED | OUTPATIENT
Start: 2020-09-14

## 2020-09-16 DIAGNOSIS — R74.8 ELEVATED ALKALINE PHOSPHATASE LEVEL: Primary | ICD-10-CM

## 2020-10-03 ENCOUNTER — TELEPHONE (OUTPATIENT)
Dept: GASTROENTEROLOGY | Facility: CLINIC | Age: 75
End: 2020-10-03

## 2020-10-06 ENCOUNTER — TELEPHONE (OUTPATIENT)
Dept: GYNECOLOGIC ONCOLOGY | Facility: CLINIC | Age: 75
End: 2020-10-06

## 2020-10-07 ENCOUNTER — OFFICE VISIT (OUTPATIENT)
Dept: GYNECOLOGIC ONCOLOGY | Facility: CLINIC | Age: 75
End: 2020-10-07
Payer: MEDICARE

## 2020-10-07 VITALS
BODY MASS INDEX: 29.11 KG/M2 | HEART RATE: 68 BPM | SYSTOLIC BLOOD PRESSURE: 150 MMHG | DIASTOLIC BLOOD PRESSURE: 100 MMHG | HEIGHT: 67 IN | WEIGHT: 185.5 LBS | TEMPERATURE: 99 F | RESPIRATION RATE: 18 BRPM

## 2020-10-07 DIAGNOSIS — C54.1 ENDOMETRIAL CANCER (HCC): Primary | ICD-10-CM

## 2020-10-07 PROCEDURE — 99213 OFFICE O/P EST LOW 20 MIN: CPT | Performed by: OBSTETRICS & GYNECOLOGY

## 2020-12-15 ENCOUNTER — TELEPHONE (OUTPATIENT)
Dept: FAMILY MEDICINE CLINIC | Facility: CLINIC | Age: 75
End: 2020-12-15

## 2021-04-29 ENCOUNTER — TELEPHONE (OUTPATIENT)
Dept: FAMILY MEDICINE CLINIC | Facility: CLINIC | Age: 76
End: 2021-04-29

## 2021-04-29 NOTE — TELEPHONE ENCOUNTER
Phone number no longer in service  Last two chart notes were for scans to be sent to providers in [place]

## 2021-06-22 ENCOUNTER — TELEPHONE (OUTPATIENT)
Dept: FAMILY MEDICINE CLINIC | Facility: CLINIC | Age: 76
End: 2021-06-22

## 2021-06-22 NOTE — TELEPHONE ENCOUNTER
Pt has moved to [facility]  Medical record request under media folder confirms she moved   Siri Hodgkin, LPN

## 2021-06-23 NOTE — TELEPHONE ENCOUNTER
06/23/21 2:53 PM     Thank you for your request  Your request has been received, reviewed, and the patient chart updated  The PCP has successfully been removed with a patient attribution note  This message will now be completed      Thank you  Jessica Mcdaniels

## (undated) DEVICE — ADHESIVE SKN CLSR HISTOACRYL FLEX 0.5ML LF

## (undated) DEVICE — SURGICEL 2 X 14

## (undated) DEVICE — 60 ML SYRINGE,REGULAR TIP: Brand: MONOJECT

## (undated) DEVICE — 1840 FOAM BLOCK NEEDLE COUNTER: Brand: DEVON

## (undated) DEVICE — SUT VICRYL 4-0 PS-2 18 IN J496G

## (undated) DEVICE — TROCAR: Brand: KII FIOS FIRST ENTRY

## (undated) DEVICE — DISPOSABLE BIOPSY VALVE MAJ-1555: Brand: SINGLE USE BIOPSY VALVE (STERILE)

## (undated) DEVICE — B-H IRRIGATING CAN 19GA FLAT ANGLED 8MM: Brand: OPHTHALMIC CANNULA

## (undated) DEVICE — GLOVE EXAM NON-STRL NTRL PLUS LRG PF

## (undated) DEVICE — THE MONARCH® "D" CARTRIDGE IS A SINGLE-USE POLYPROPYLENE CARTRIDGE FOR POSTERIOR CHAMBER IOL DELIVERY: Brand: MONARCH® III

## (undated) DEVICE — DRAPE EQUIPMENT RF WAND

## (undated) DEVICE — ASTOUND IMPERVIOUS SURGICAL GOWN: Brand: CONVERTORS

## (undated) DEVICE — CHLORHEXIDINE 4PCT 4 OZ

## (undated) DEVICE — PACK GENERAL LF

## (undated) DEVICE — BRUSH ENDO CLEANING DBL-HEADER

## (undated) DEVICE — FLOSEAL HEMOSTATIC MATRIX, 5 ML: Brand: FLOSEAL

## (undated) DEVICE — SUT VICRYL 4-0 PS-2 27 IN J426H

## (undated) DEVICE — EYE PACK CUSTOM -FINNEGAN

## (undated) DEVICE — BASIC DOUBLE BASIN 2-LF: Brand: MEDLINE INDUSTRIES, INC.

## (undated) DEVICE — 0.9MM MICROSMOOTH NULTRA INFUSION SLEEVE KIT: Brand: INFINIT, MICROSMOOTH, ALCON

## (undated) DEVICE — TRAVELKIT CONTAINS FIRST STEP KIT (200ML EP-4 KIT) AND SOILED SCOPE BAG - 1 KIT: Brand: TRAVELKIT CONTAINS FIRST STEP KIT AND SOILED SCOPE BAG

## (undated) DEVICE — DRAPE UTILITY

## (undated) DEVICE — MEDI-VAC YANK SUCT HNDL W/TPRD BULBOUS TIP: Brand: CARDINAL HEALTH

## (undated) DEVICE — INSUFLATION TUBING INSUFLOW (LEXION)

## (undated) DEVICE — ENSEAL 20 CM SHAFT, LARGE JAW: Brand: ENSEAL X1

## (undated) DEVICE — ALL PURPOSE SPONGES,NONWOVEN, 4 PLY: Brand: CURITY

## (undated) DEVICE — TROCAR: Brand: KII® SLEEVE

## (undated) DEVICE — GLOVE SRG BIOGEL 7.5

## (undated) DEVICE — TUBING BUBBLE CLEAR 5MM X 100 FT NS

## (undated) DEVICE — MEDI-VAC YANKAUER SUCTION HANDLE: Brand: CARDINAL HEALTH

## (undated) DEVICE — EYE PADS 1 5/8"X2 5/8": Brand: MCKESSON

## (undated) DEVICE — "MB-142 MOUTHPIECE": Brand: MOUTHPIECE

## (undated) DEVICE — BITE BLOCK ENDO 60FR ADLT MAXI  DISP W/STRAP

## (undated) DEVICE — UNDYED BRAIDED (POLYGLACTIN 910), SYNTHETIC ABSORBABLE SUTURE: Brand: COATED VICRYL

## (undated) DEVICE — "MAJ-901 WATER CONTAINER SET CV-160/140": Brand: WATER CONTAINER

## (undated) DEVICE — ANTI-FOG SOLUTION WITH FOAM PAD: Brand: DEVON

## (undated) DEVICE — LIGACLIP APPLIER LARGE

## (undated) DEVICE — AIRLIFE™  ADULT CUSHION NASAL CANNULA WITH 7 FOOT (2.1 M) CRUSH-RESISTANT OXYGEN TUBING, AND U/CONNECT-IT ADAPTER: Brand: AIRLIFE™

## (undated) DEVICE — BASIC ULTRASOUND: Brand: ALCON, INFINITI

## (undated) DEVICE — GAUZE SPONGES,16 PLY: Brand: CURITY

## (undated) DEVICE — WET SKIN PREP TRAY: Brand: MEDLINE INDUSTRIES, INC.

## (undated) DEVICE — GLOVE PI ULTRA TOUCH SZ.6.5

## (undated) DEVICE — LIGHT GLOVE GREEN

## (undated) DEVICE — "MH-443 SUCTION VALVE F/EVIS140 EVIS160": Brand: SUCTION VALVE

## (undated) DEVICE — 3M™ STERI-STRIP™ REINFORCED ADHESIVE SKIN CLOSURES, R1547, 1/2 IN X 4 IN (12 MM X 100 MM), 6 STRIPS/ENVELOPE: Brand: 3M™ STERI-STRIP™

## (undated) DEVICE — SCD SEQUENTIAL COMPRESSION COMFORT SLEEVE MEDIUM KNEE LENGTH: Brand: KENDALL SCD

## (undated) DEVICE — 1200CC GUARDIAN II: Brand: GUARDIAN

## (undated) DEVICE — SOLIDIFIER FLUID WASTE CONTROL 1500ML

## (undated) DEVICE — 45° KELMAN®, 0.9 MM TURBOSONICS® MINI-FLARED ABS® TIP: Brand: ALCON, KELMAN, TURBOSONICS, MINI-FLARED ABS

## (undated) DEVICE — TRAY FOLEY 16FR URIMETER SILICONE SURESTEP

## (undated) DEVICE — AIR INJECT CANNULA 27GA: Brand: OPHTHALMIC CANNULA

## (undated) DEVICE — TELFA NON-ADHERENT ABSORBENT DRESSING: Brand: TELFA

## (undated) DEVICE — GLOVE INDICATOR PI UNDERGLOVE SZ 6.5 BLUE

## (undated) DEVICE — PERI/GYN PACK: Brand: CONVERTORS

## (undated) DEVICE — CHLORAPREP HI-LITE 26ML ORANGE

## (undated) DEVICE — SUT VICRYL 1 CT-1 CR/8 27 IN JJ40G

## (undated) DEVICE — INTENDED FOR TISSUE SEPARATION, AND OTHER PROCEDURES THAT REQUIRE A SHARP SURGICAL BLADE TO PUNCTURE OR CUT.: Brand: BARD-PARKER SAFETY BLADES SIZE 10, STERILE

## (undated) DEVICE — WOUND RETRACTOR AND PROTECTOR: Brand: ALEXIS WOUND PROTECTOR-RETRACTOR

## (undated) DEVICE — CLEARCUT® SLIT KNIFE INTREPID MICRO-COAXIAL SYSTEM 2.4 SB: Brand: CLEARCUT®; INTREPID

## (undated) DEVICE — LUBRICANT SURGILUBE TUBE 4 OZ  FLIP TOP

## (undated) DEVICE — LAVH/LAPAROSCOPY DRAPE: Brand: CONVERTORS

## (undated) DEVICE — INTENDED FOR TISSUE SEPARATION, AND OTHER PROCEDURES THAT REQUIRE A SHARP SURGICAL BLADE TO PUNCTURE OR CUT.: Brand: BARD-PARKER SAFETY BLADES SIZE 11, STERILE

## (undated) DEVICE — ENSEAL LAPAROSCOPIC TISSUE SEALER G2 CURVED JAW FOR USE WITH G2 GENERATOR 5MM DIAMETER 35CM SHAFT LENGTH: Brand: ENSEAL

## (undated) DEVICE — IRRIG ENDO FLO TUBING

## (undated) DEVICE — ABDOMINAL PAD: Brand: DERMACEA

## (undated) DEVICE — "MH-438 A/W VLVE F/140 EVIS-140": Brand: AIR/WATER VALVE

## (undated) DEVICE — PROXIMATE PLUS MD MULTI-DIRECTIONAL RELEASE SKIN STAPLERS CONTAINS 35 STAINLESS STEEL STAPLES APPROXIMATE CLOSED DIMENSIONS: 6.9MM X 3.9MM WIDE: Brand: PROXIMATE